# Patient Record
Sex: FEMALE | Race: WHITE | NOT HISPANIC OR LATINO
[De-identification: names, ages, dates, MRNs, and addresses within clinical notes are randomized per-mention and may not be internally consistent; named-entity substitution may affect disease eponyms.]

---

## 2018-05-17 ENCOUNTER — TRANSCRIBE ORDERS (OUTPATIENT)
Dept: SCHEDULING | Age: 63
End: 2018-05-17

## 2018-05-17 DIAGNOSIS — R01.1 UNDIAGNOSED CARDIAC MURMURS: Primary | ICD-10-CM

## 2018-05-17 DIAGNOSIS — R94.31 NONSPECIFIC ABNORMAL ELECTROCARDIOGRAM (ECG) (EKG): ICD-10-CM

## 2018-05-17 DIAGNOSIS — I10 ESSENTIAL HYPERTENSION, MALIGNANT: ICD-10-CM

## 2018-05-30 ENCOUNTER — HOSPITAL ENCOUNTER (OUTPATIENT)
Dept: CARDIOLOGY | Facility: CLINIC | Age: 63
Discharge: HOME | End: 2018-05-30
Attending: FAMILY MEDICINE
Payer: COMMERCIAL

## 2018-05-30 VITALS
HEIGHT: 69 IN | BODY MASS INDEX: 22.81 KG/M2 | WEIGHT: 154 LBS | DIASTOLIC BLOOD PRESSURE: 80 MMHG | SYSTOLIC BLOOD PRESSURE: 138 MMHG | HEART RATE: 88 BPM

## 2018-05-30 DIAGNOSIS — R94.31 NONSPECIFIC ABNORMAL ELECTROCARDIOGRAM (ECG) (EKG): ICD-10-CM

## 2018-05-30 DIAGNOSIS — I10 ESSENTIAL HYPERTENSION, MALIGNANT: ICD-10-CM

## 2018-05-30 DIAGNOSIS — R01.1 UNDIAGNOSED CARDIAC MURMURS: ICD-10-CM

## 2018-05-30 LAB
BSA FOR ECHO PROCEDURE: 1.84 M2
E WAVE DECELERATION TIME: 257 MS
E/A RATIO: 1.3
E/E' RATIO: 21.2
E/LAT E' RATIO: 12
EDV (MM-TEICH): 69.6 CM3
EF (MM-TEICH): 75.9 %
EJECTION FRACTION: 76 %
ESV (MM-TEICH): 16.8 CM3
INTERVENTRICULAR SEPTUM: 1 CM
LA/AORTA RATIO: 1.43
LEFT INTERNAL DIMENSION IN SYSTOLE: 2.2 CM (ref 2.58–3.9)
LEFT VENTRICULAR INTERNAL DIMENSION IN DIASTOLE: 4 CM (ref 4.36–6.05)
M MODE - INTERVENTRICULAR SEPTUM IN END DIASTOLE: 0.96 CM
M MODE - LEFT INTERNAL DIMENSION IN SYSTOLE: 2.23 CM
M MODE - LEFT VENTRICULAR INTERNAL DIMENSION IN DIASTOLE: 3.99 CM
M MODE - LEFT VENTRICULAR POSTERIOR WALL IN END DIASTOLE: 1.11 CM
MV E'TISSUE VEL-LAT: 0.1 M/S
MV E'TISSUE VEL-MED: 0.06 M/S
MV PEAK A VEL: 0.95 M/S
MV PEAK E VEL: 1.25 M/S
POSTERIOR WALL: 1.1 CM
STRESS ANGINA INDEX: 0
STRESS BASELINE BP: NORMAL MMHG
STRESS BASELINE HR: 71 BPM
STRESS PERCENT HR: 96 %
STRESS POST ESTIMATED WORKLOAD: 10.1 METS
STRESS POST EXERCISE DUR MIN: 8 MIN
STRESS POST EXERCISE DUR SEC: 0 SEC
STRESS POST PEAK BP: NORMAL MMHG
STRESS POST PEAK HR: 150 BPM
STRESS TARGET HR: 133 BPM
TR MAX PG: 25 MMHG
TRICUSPID VALVE PEAK REGURGITATION VELOCITY: 2.49 M/S
Z-SCORE OF LEFT VENTRICULAR DIMENSION IN END DIASTOLE: -2.5
Z-SCORE OF LEFT VENTRICULAR DIMENSION IN END SYSTOLE: -2.88

## 2018-05-30 PROCEDURE — 93350 STRESS TTE ONLY: CPT

## 2019-01-23 ENCOUNTER — TRANSCRIBE ORDERS (OUTPATIENT)
Dept: SCHEDULING | Age: 64
End: 2019-01-23

## 2019-01-23 DIAGNOSIS — Z12.31 ENCOUNTER FOR SCREENING MAMMOGRAM FOR MALIGNANT NEOPLASM OF BREAST: Primary | ICD-10-CM

## 2019-02-13 ENCOUNTER — HOSPITAL ENCOUNTER (OUTPATIENT)
Dept: RADIOLOGY | Age: 64
Discharge: HOME | End: 2019-02-13
Attending: FAMILY MEDICINE
Payer: COMMERCIAL

## 2019-02-13 DIAGNOSIS — Z12.31 ENCOUNTER FOR SCREENING MAMMOGRAM FOR MALIGNANT NEOPLASM OF BREAST: ICD-10-CM

## 2019-02-13 PROCEDURE — 77067 SCR MAMMO BI INCL CAD: CPT

## 2019-08-20 ENCOUNTER — TRANSCRIBE ORDERS (OUTPATIENT)
Dept: LAB | Facility: CLINIC | Age: 64
End: 2019-08-20

## 2019-08-20 ENCOUNTER — HOSPITAL ENCOUNTER (OUTPATIENT)
Dept: RADIOLOGY | Facility: CLINIC | Age: 64
Discharge: HOME | End: 2019-08-20
Attending: FAMILY MEDICINE
Payer: COMMERCIAL

## 2019-08-20 DIAGNOSIS — R05.9 COUGH: Primary | ICD-10-CM

## 2019-08-20 DIAGNOSIS — R05.9 COUGH: ICD-10-CM

## 2019-08-20 PROCEDURE — 71046 X-RAY EXAM CHEST 2 VIEWS: CPT

## 2020-02-14 ENCOUNTER — TRANSCRIBE ORDERS (OUTPATIENT)
Dept: RADIOLOGY | Age: 65
End: 2020-02-14

## 2020-02-14 ENCOUNTER — HOSPITAL ENCOUNTER (OUTPATIENT)
Dept: RADIOLOGY | Age: 65
Discharge: HOME | End: 2020-02-14
Attending: FAMILY MEDICINE
Payer: COMMERCIAL

## 2020-02-14 DIAGNOSIS — Z12.31 ENCOUNTER FOR SCREENING MAMMOGRAM FOR MALIGNANT NEOPLASM OF BREAST: Primary | ICD-10-CM

## 2020-02-14 DIAGNOSIS — Z12.31 ENCOUNTER FOR SCREENING MAMMOGRAM FOR MALIGNANT NEOPLASM OF BREAST: ICD-10-CM

## 2020-02-14 PROCEDURE — 77063 BREAST TOMOSYNTHESIS BI: CPT

## 2020-02-14 PROCEDURE — 77067 SCR MAMMO BI INCL CAD: CPT

## 2021-01-22 ENCOUNTER — TRANSCRIBE ORDERS (OUTPATIENT)
Dept: SCHEDULING | Age: 66
End: 2021-01-22

## 2021-01-22 DIAGNOSIS — R06.02 SHORTNESS OF BREATH: ICD-10-CM

## 2021-01-22 DIAGNOSIS — R63.4 ABNORMAL WEIGHT LOSS: ICD-10-CM

## 2021-01-22 DIAGNOSIS — R05.9 COUGH: Primary | ICD-10-CM

## 2021-01-26 ENCOUNTER — TRANSCRIBE ORDERS (OUTPATIENT)
Dept: SCHEDULING | Age: 66
End: 2021-01-26

## 2021-01-26 DIAGNOSIS — Z12.31 ENCOUNTER FOR SCREENING MAMMOGRAM FOR MALIGNANT NEOPLASM OF BREAST: Primary | ICD-10-CM

## 2021-01-27 ENCOUNTER — HOSPITAL ENCOUNTER (OUTPATIENT)
Dept: RADIOLOGY | Facility: CLINIC | Age: 66
Discharge: HOME | End: 2021-01-27
Attending: NURSE PRACTITIONER
Payer: COMMERCIAL

## 2021-01-27 DIAGNOSIS — R05.9 COUGH: ICD-10-CM

## 2021-01-27 DIAGNOSIS — R06.02 SHORTNESS OF BREATH: ICD-10-CM

## 2021-01-27 DIAGNOSIS — R63.4 ABNORMAL WEIGHT LOSS: ICD-10-CM

## 2021-01-27 PROCEDURE — 71250 CT THORAX DX C-: CPT

## 2021-02-01 ENCOUNTER — HOSPITAL ENCOUNTER (OUTPATIENT)
Dept: RADIOLOGY | Facility: CLINIC | Age: 66
Discharge: HOME | End: 2021-02-01
Attending: INTERNAL MEDICINE
Payer: COMMERCIAL

## 2021-02-01 VITALS — HEIGHT: 69 IN | BODY MASS INDEX: 21.33 KG/M2 | WEIGHT: 144 LBS

## 2021-02-01 DIAGNOSIS — R91.8 OTHER NONSPECIFIC ABNORMAL FINDING OF LUNG FIELD: ICD-10-CM

## 2021-02-01 RX ORDER — FLUDEOXYGLUCOSE F18 300 MCI/ML
8.51 INJECTION INTRAVENOUS ONCE
Status: COMPLETED | OUTPATIENT
Start: 2021-02-01 | End: 2021-02-01

## 2021-02-01 RX ADMIN — FLUDEOXYGLUCOSE F18 8.51 MILLICURIE: 300 INJECTION INTRAVENOUS at 09:25

## 2021-02-05 ENCOUNTER — HOSPITAL ENCOUNTER (OUTPATIENT)
Facility: HOSPITAL | Age: 66
Discharge: HOME | End: 2021-02-05
Attending: INTERNAL MEDICINE | Admitting: INTERNAL MEDICINE
Payer: COMMERCIAL

## 2021-02-05 VITALS
TEMPERATURE: 98.6 F | WEIGHT: 146 LBS | BODY MASS INDEX: 21.62 KG/M2 | RESPIRATION RATE: 18 BRPM | DIASTOLIC BLOOD PRESSURE: 73 MMHG | OXYGEN SATURATION: 93 % | HEIGHT: 69 IN | HEART RATE: 94 BPM | SYSTOLIC BLOOD PRESSURE: 165 MMHG

## 2021-02-05 DIAGNOSIS — R59.0 MEDIASTINAL ADENOPATHY: ICD-10-CM

## 2021-02-05 DIAGNOSIS — R91.8 LUNG MASS: ICD-10-CM

## 2021-02-05 PROCEDURE — 71000011 HC PACU PHASE 1 EA ADDL MIN: Performed by: INTERNAL MEDICINE

## 2021-02-05 PROCEDURE — 0BD88ZX EXTRACTION OF LEFT UPPER LOBE BRONCHUS, VIA NATURAL OR ARTIFICIAL OPENING ENDOSCOPIC, DIAGNOSTIC: ICD-10-PCS | Performed by: INTERNAL MEDICINE

## 2021-02-05 PROCEDURE — 36000048 HC BRONCH EBUS SAMPLING 3/>NODE

## 2021-02-05 PROCEDURE — 36100169

## 2021-02-05 PROCEDURE — 07D78ZX EXTRACTION OF THORAX LYMPHATIC, VIA NATURAL OR ARTIFICIAL OPENING ENDOSCOPIC, DIAGNOSTIC: ICD-10-PCS | Performed by: INTERNAL MEDICINE

## 2021-02-05 PROCEDURE — 25000000 HC PHARMACY GENERAL: Performed by: INTERNAL MEDICINE

## 2021-02-05 PROCEDURE — 25800000 HC PHARMACY IV SOLUTIONS: Performed by: INTERNAL MEDICINE

## 2021-02-05 PROCEDURE — 71000001 HC PACU PHASE 1 INITIAL 30MIN: Performed by: INTERNAL MEDICINE

## 2021-02-05 PROCEDURE — U0002 COVID-19 LAB TEST NON-CDC: HCPCS | Performed by: INTERNAL MEDICINE

## 2021-02-05 PROCEDURE — 88112 CYTOPATH CELL ENHANCE TECH: CPT | Mod: 59 | Performed by: INTERNAL MEDICINE

## 2021-02-05 PROCEDURE — 0BDB8ZX EXTRACTION OF LEFT LOWER LOBE BRONCHUS, VIA NATURAL OR ARTIFICIAL OPENING ENDOSCOPIC, DIAGNOSTIC: ICD-10-PCS | Performed by: INTERNAL MEDICINE

## 2021-02-05 PROCEDURE — 27200000 HC STERILE SUPPLY

## 2021-02-05 PROCEDURE — 94640 AIRWAY INHALATION TREATMENT: CPT

## 2021-02-05 PROCEDURE — 36000049 HC BRONCH EBUS SAMPLING 1/2 NODE

## 2021-02-05 PROCEDURE — BB4CZZZ ULTRASONOGRAPHY OF MEDIASTINUM: ICD-10-PCS | Performed by: INTERNAL MEDICINE

## 2021-02-05 RX ORDER — LIDOCAINE HYDROCHLORIDE 40 MG/ML
5 SOLUTION TOPICAL ONCE
Status: COMPLETED | OUTPATIENT
Start: 2021-02-05 | End: 2021-02-05

## 2021-02-05 RX ORDER — DILTIAZEM HYDROCHLORIDE 240 MG/1
CAPSULE, COATED, EXTENDED RELEASE ORAL
COMMUNITY
Start: 2021-01-22 | End: 2022-01-16

## 2021-02-05 RX ORDER — SODIUM CHLORIDE 9 MG/ML
INJECTION, SOLUTION INTRAVENOUS CONTINUOUS
Status: DISCONTINUED | OUTPATIENT
Start: 2021-02-05 | End: 2021-02-06 | Stop reason: HOSPADM

## 2021-02-05 RX ADMIN — LIDOCAINE HYDROCHLORIDE 5 ML: 40 SOLUTION TOPICAL at 13:37

## 2021-02-05 RX ADMIN — SODIUM CHLORIDE: 9 INJECTION, SOLUTION INTRAVENOUS at 11:28

## 2021-02-05 NOTE — PERIOPERATIVE NURSING NOTE
Pt. D/c'd from Stage 2 to home as per Dr. Redd's orders. VSS. Pt. Has no c/o pain. Pt has no bloody sputum noted with cough.

## 2021-02-05 NOTE — DISCHARGE INSTRUCTIONS
Moderate Conscious Sedation, Adult, Care After  These instructions provide you with information about caring for yourself after your procedure. Your health care provider may also give you more specific instructions. Your treatment has been planned according to current medical practices, but problems sometimes occur. Call your health care provider if you have any problems or questions after your procedure.  What can I expect after the procedure?  After your procedure, it is common:  · To feel sleepy for several hours.  · To feel clumsy and have poor balance for several hours.  · To have poor judgment for several hours.  · To vomit if you eat too soon.  Follow these instructions at home:  For at least 24 hours after the procedure:    · Do not:  ? Participate in activities where you could fall or become injured.  ? Drive.  ? Use heavy machinery.  ? Drink alcohol.  ? Take sleeping pills or medicines that cause drowsiness.  ? Make important decisions or sign legal documents.  ? Take care of children on your own.  · Rest.  Eating and drinking  · Follow the diet recommended by your health care provider.  · If you vomit:  ? Drink water, juice, or soup when you can drink without vomiting.  ? Make sure you have little or no nausea before eating solid foods.  General instructions  · Have a responsible adult stay with you until you are awake and alert.  · Take over-the-counter and prescription medicines only as told by your health care provider.  · If you smoke, do not smoke without supervision.  · Keep all follow-up visits as told by your health care provider. This is important.  Contact a health care provider if:  · You keep feeling nauseous or you keep vomiting.  · You feel light-headed.  · You develop a rash.  · You have a fever.  Get help right away if:  · You have trouble breathing.  This information is not intended to replace advice given to you by your health care provider. Make sure you discuss any questions you have  with your health care provider.  Document Released: 10/08/2014 Document Revised: 11/30/2018 Document Reviewed: 04/08/2017  Elsevier Patient Education © 2020 [employer]       Bronchoscopy  Discharge Instructions          1. You had a bronchoscopy today. For this, IV sedation/anesthesia was used, so you may feel drowsy. You are not permitted to drive today, nor have any alcoholic beverages. Rest for the remainder of the day. You may resume these activities tomorrow.    2. Occasionally, the arm gets sore at the site of the IV. Use cold compresses today followed by warm compresses tomorrow and as long as necessary. If a red streak develops from the site, please notify your physician.    3. You may eat today at 3:30PM. Begin by taking a small sip of water. If you do not cough, you may eat and drink. Please chew your food well and eat slowly.    4. You may see streaks of blood in your sputum for 1-2 days, this is normal and expected.    ***RED FLAGS***:  If the amount of blood in your sputum increases or you develop chest pain, shortness of breath, a temperature greater than 102F, or any other symptoms that you find concerning, please call your doctor immediately.    5. Medications: See Medication Reconciliation List.    6. If a problem occurs after discharge, please notify Dr. Daniela Redd at [phone].    7. Call Dr. Redd for the results of your bronchoscopy in 48-72 hours at [phone].    8. Follow up appointment: as previously arranged. You may inquire about this at the telephone number listed above if you do not have an appointment already scheduled.      These discharge instructions have been explained to the patient and/or his/her family. I have received and understand the above instructions.        Valentina Thorne MD for Dr. Daniela Redd  02/05/21

## 2021-02-05 NOTE — OR SURGEON
Lisa Kezia presents today for bronchoscopy EBUS/radial probe. I have identified the patient in the preop waiting area. Consent was obtained and is on the chart. I am the  performing the procedure.      Daniela Redd DO [facility]

## 2021-02-09 ENCOUNTER — LAB REQUISITION (OUTPATIENT)
Dept: LAB | Facility: HOSPITAL | Age: 66
End: 2021-02-09
Attending: INTERNAL MEDICINE
Payer: COMMERCIAL

## 2021-02-09 DIAGNOSIS — C34.12 MALIGNANT NEOPLASM OF UPPER LOBE, LEFT BRONCHUS OR LUNG (CMS/HCC): ICD-10-CM

## 2021-02-09 LAB
ALBUMIN SERPL-MCNC: 3.3 G/DL (ref 3.4–5)
ALP SERPL-CCNC: 61 IU/L (ref 35–126)
ALT SERPL-CCNC: 25 IU/L (ref 11–54)
ANION GAP SERPL CALC-SCNC: 10 MEQ/L (ref 3–15)
AST SERPL-CCNC: 48 IU/L (ref 15–41)
BASOPHILS # BLD: 0.04 K/UL (ref 0.01–0.1)
BASOPHILS NFR BLD: 0.5 %
BILIRUB SERPL-MCNC: 0.8 MG/DL (ref 0.3–1.2)
BUN SERPL-MCNC: 7 MG/DL (ref 8–20)
CALCIUM SERPL-MCNC: 9.6 MG/DL (ref 8.9–10.3)
CEA SERPL-MCNC: 22 NG/ML
CHLORIDE SERPL-SCNC: 102 MEQ/L (ref 98–109)
CO2 SERPL-SCNC: 26 MEQ/L (ref 22–32)
CREAT SERPL-MCNC: 0.7 MG/DL (ref 0.6–1.1)
DIFFERENTIAL METHOD BLD: ABNORMAL
EOSINOPHIL # BLD: 0.31 K/UL (ref 0.04–0.36)
EOSINOPHIL NFR BLD: 3.7 %
ERYTHROCYTE [DISTWIDTH] IN BLOOD BY AUTOMATED COUNT: 14.8 % (ref 11.7–14.4)
EST. AVERAGE GLUCOSE BLD GHB EST-MCNC: 91 MG/DL
FERRITIN SERPL-MCNC: 364 NG/ML (ref 11–250)
FOLATE SERPL-MCNC: 17.9 NG/ML
GFR SERPL CREATININE-BSD FRML MDRD: >60 ML/MIN/1.73M*2
GLUCOSE SERPL-MCNC: 117 MG/DL (ref 70–99)
HBA1C MFR BLD HPLC: 4.8 %
HCT VFR BLDCO AUTO: 32.3 % (ref 35–45)
HGB BLD-MCNC: 10.2 G/DL (ref 11.8–15.7)
IMM GRANULOCYTES # BLD AUTO: 0.05 K/UL (ref 0–0.08)
IMM GRANULOCYTES NFR BLD AUTO: 0.6 %
IRON SATN MFR SERPL: 9 % (ref 15–45)
IRON SERPL-MCNC: 23 UG/DL (ref 35–150)
LYMPHOCYTES # BLD: 1.91 K/UL (ref 1.2–3.5)
LYMPHOCYTES NFR BLD: 23 %
MAGNESIUM SERPL-MCNC: 2 MG/DL (ref 1.8–2.5)
MCH RBC QN AUTO: 26.3 PG (ref 28–33.2)
MCHC RBC AUTO-ENTMCNC: 31.6 G/DL (ref 32.2–35.5)
MCV RBC AUTO: 83.2 FL (ref 83–98)
MONOCYTES # BLD: 0.85 K/UL (ref 0.28–0.8)
MONOCYTES NFR BLD: 10.2 %
NEUTROPHILS # BLD: 5.16 K/UL (ref 1.7–7)
NEUTROPHILS # BLD: 5.16 K/UL (ref 1.7–7)
NEUTS SEG NFR BLD: 62 %
NRBC BLD-RTO: 0 %
PDW BLD AUTO: 9.6 FL (ref 9.4–12.3)
PLATELET # BLD AUTO: 373 K/UL (ref 150–369)
POTASSIUM SERPL-SCNC: 4.1 MEQ/L (ref 3.6–5.1)
PROT SERPL-MCNC: 6.4 G/DL (ref 6–8.2)
RBC # BLD AUTO: 3.88 M/UL (ref 3.93–5.22)
SODIUM SERPL-SCNC: 138 MEQ/L (ref 136–144)
TIBC SERPL-MCNC: 252 UG/DL (ref 270–460)
UIBC SERPL-MCNC: 229 UG/DL (ref 180–360)
VIT B12 SERPL-MCNC: 407 PG/ML (ref 180–914)
WBC # BLD AUTO: 8.32 K/UL (ref 3.8–10.5)

## 2021-02-09 PROCEDURE — 82746 ASSAY OF FOLIC ACID SERUM: CPT | Performed by: INTERNAL MEDICINE

## 2021-02-09 PROCEDURE — 83540 ASSAY OF IRON: CPT | Performed by: INTERNAL MEDICINE

## 2021-02-09 PROCEDURE — 80053 COMPREHEN METABOLIC PANEL: CPT | Performed by: INTERNAL MEDICINE

## 2021-02-09 PROCEDURE — 85025 COMPLETE CBC W/AUTO DIFF WBC: CPT | Performed by: INTERNAL MEDICINE

## 2021-02-09 PROCEDURE — 36415 COLL VENOUS BLD VENIPUNCTURE: CPT | Performed by: INTERNAL MEDICINE

## 2021-02-09 PROCEDURE — 82728 ASSAY OF FERRITIN: CPT | Performed by: INTERNAL MEDICINE

## 2021-02-09 PROCEDURE — 83036 HEMOGLOBIN GLYCOSYLATED A1C: CPT | Performed by: INTERNAL MEDICINE

## 2021-02-09 PROCEDURE — 82378 CARCINOEMBRYONIC ANTIGEN: CPT | Performed by: INTERNAL MEDICINE

## 2021-02-09 PROCEDURE — 83735 ASSAY OF MAGNESIUM: CPT | Performed by: INTERNAL MEDICINE

## 2021-02-09 PROCEDURE — 82607 VITAMIN B-12: CPT | Performed by: INTERNAL MEDICINE

## 2021-03-01 LAB
CASE RPRT: NORMAL
IMMUNE STAIN STUDY: NORMAL
PATH REPORT.ADDENDUM SPEC: NORMAL
PATH REPORT.ADDENDUM SPEC: NORMAL
PATH REPORT.FINAL DX SPEC: NORMAL
PATH REPORT.FINAL DX SPEC: NORMAL
PATH REPORT.GROSS SPEC: NORMAL

## 2021-04-13 DIAGNOSIS — Z23 ENCOUNTER FOR IMMUNIZATION: ICD-10-CM

## 2021-11-26 ENCOUNTER — BMR PREADMISSION ASSESSMENT (OUTPATIENT)
Dept: ADMISSIONS | Facility: REHABILITATION | Age: 66
End: 2021-11-26

## 2021-12-22 ENCOUNTER — TRANSCRIBE ORDERS (OUTPATIENT)
Dept: SCHEDULING | Facility: REHABILITATION | Age: 66
End: 2021-12-22

## 2021-12-22 DIAGNOSIS — C79.49 SECONDARY MALIGNANT NEOPLASM OF OTHER PARTS OF NERVOUS SYSTEM (CMS/HCC): ICD-10-CM

## 2021-12-22 DIAGNOSIS — C79.31 SECONDARY MALIGNANT NEOPLASM OF BRAIN (CMS/HCC): Primary | ICD-10-CM

## 2021-12-22 DIAGNOSIS — Z98.890 OTHER SPECIFIED POSTPROCEDURAL STATES: ICD-10-CM

## 2021-12-22 DIAGNOSIS — R26.89 OTHER ABNORMALITIES OF GAIT AND MOBILITY: ICD-10-CM

## 2022-01-03 ENCOUNTER — HOSPITAL ENCOUNTER (OUTPATIENT)
Dept: OCCUPATIONAL THERAPY | Facility: REHABILITATION | Age: 67
Setting detail: THERAPIES SERIES
Discharge: HOME | End: 2022-01-03
Attending: PHYSICIAN ASSISTANT
Payer: COMMERCIAL

## 2022-01-03 ENCOUNTER — HOSPITAL ENCOUNTER (OUTPATIENT)
Dept: PHYSICAL THERAPY | Facility: REHABILITATION | Age: 67
Setting detail: THERAPIES SERIES
Discharge: HOME | End: 2022-01-03
Attending: PHYSICIAN ASSISTANT
Payer: COMMERCIAL

## 2022-01-03 DIAGNOSIS — C79.49 SECONDARY MALIGNANT NEOPLASM OF OTHER PARTS OF NERVOUS SYSTEM (CMS/HCC): ICD-10-CM

## 2022-01-03 DIAGNOSIS — Z98.890 OTHER SPECIFIED POSTPROCEDURAL STATES: ICD-10-CM

## 2022-01-03 DIAGNOSIS — C79.31 SECONDARY MALIGNANT NEOPLASM OF BRAIN (CMS/HCC): Primary | ICD-10-CM

## 2022-01-03 DIAGNOSIS — R26.89 OTHER ABNORMALITIES OF GAIT AND MOBILITY: ICD-10-CM

## 2022-01-03 DIAGNOSIS — C79.31 SECONDARY MALIGNANT NEOPLASM OF BRAIN (CMS/HCC): ICD-10-CM

## 2022-01-03 PROCEDURE — 97530 THERAPEUTIC ACTIVITIES: CPT | Mod: GP

## 2022-01-03 PROCEDURE — 97162 PT EVAL MOD COMPLEX 30 MIN: CPT | Mod: GP

## 2022-01-03 PROCEDURE — 97167 OT EVAL HIGH COMPLEX 60 MIN: CPT | Mod: GO

## 2022-01-03 RX ORDER — LANSOPRAZOLE 30 MG/1
30 CAPSULE, DELAYED RELEASE ORAL
COMMUNITY
Start: 2021-12-20 | End: 2022-01-03

## 2022-01-03 RX ORDER — LOSARTAN POTASSIUM 25 MG/1
25 TABLET ORAL DAILY
COMMUNITY
Start: 2021-03-08 | End: 2022-01-16

## 2022-01-03 NOTE — Clinical Note
[address]  [facility] [address]  [phone]      Dear DR. Lott,      Thank you for this referral. Please review the attached notes and plan of care for your approval.  Please contact our department with any questions.     Sincerely,     Gabriela Voss, WILBERT    Referring Provider: By co-signing this Plan of Care (POC) either electronically or physically you agree to the following:    I have reviewed the the Plan of Care established by the therapist within this document and certify that the services are skilled and medically necessary. I have reviewed the plan and recommend that these services continue to meet the goals stated in this document.       EXTERNAL PROVIDER FAXING BACK:    PHYSICIAN SIGNATURE: __________________________________     DATE: ___________________   TIME: _________    IMPORTANT:  If returning this Plan of Care by fax, please fax back ONLY the signature page.   _____________________________________________________________________      [facility] Fax: [phone]      OT EVALUATION FOR OUTPATIENT THERAPY    Patient: Lisa Mast   MRN: [id number]  : 1955 66 y.o.  Referring Physician: Hilda Lott P*  Date of Visit: 1/3/2022    Certification Dates:   22 through 22    Recommended Frequency & Duration:  2 times/week for up to 3 months        Diagnosis:   1. Secondary malignant neoplasm of brain (CMS/HCC)    2. 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 Other specified postprocedural states    4. Other abnormalities of gait and mobility        History of Present Illness: Malignant Neoplasm     Chief Complaints:   Chief Complaint   Patient presents with   • Self Care Difficulties   • Visual Deficits   • Dec Strength   • Balance Deficits   • Pain   • Cognition   • Dec Coordination   • Decreased Mobility   •  Difficulty Performing Work/school Tasks       Precautions: fall,chemotherapy    Past Medical History:   Past Medical History:    Diagnosis Date   • Hypertension    • Lung mass        Past Surgical History:   Past Surgical History:   Procedure Laterality Date   • APPENDECTOMY     • CATARACT EXTRACTION, BILATERAL           LEARNING ASSESSMENT    Assessment completed: Yes    Learner name:  Lisa    Relationship: Patient    Learning Barriers:  Learning barriers: Cognitive    Preferred Language: English     Needed: No    Learning New Concepts: Listening, Reading, Demonstration and Pictures/Video    Education Provided: ON OT POC.       CO-LEARNER ASSESSMENT:    Completed: Yes:   Co-Learner name:  Madhu    Relationship: Significant other    Learning Barriers:  Learning barriers: No Barriers    Preferred Language: English     Needed: No    Learning New Concepts: Listening, Reading, Demonstration and Pictures/Video    Education Provided: On OT POC.           OBJECTIVE MEASUREMENTS/DATA:     Assessment - 01/03/22 1310        Assessment    Plan of Care reviewed and patient/family in agreement Yes     System Pathology/Pathophysiology Noted neuromuscular     Functional Limitations in Following Categories self-care;home management;community/leisure     Problem List: Occupational Therapy pain;impaired balance;strength decreased;coordination impaired;impaired cognition;motor control impaired     Rehab Potential/Prognosis: Occupational Therapy good, to achieve stated therapy goals     Clinical Assessment Lisa Mast is a 66 year old right hand dominant female referred to OP OT following a diagnosis of malignant neoplasm in 11/2021. Pt arrives to evaluation with spouse, Madhu who was present for evaluation. In November, 2021 pt began having headaches. Scans were ran and pt discovered a brain mass growing in the cerebellum. Pt went into [facility] for surgery on 11/22/21 to remove the tumor. Pt is currently receiving Keytruda infusions every 6 weeks to treat current diagnosis of Lung Cancer. Pt arrives pleasant to evaluation. Reports biggest  challenge since removal of the brain tumor has been her balance. Subjectively, pt reports her main goals for OP OT are to improve balance, steadiness during functionals tasks, improve vision impairments and to get rid of headaches. Pt was able to tolerate first 30 minutes of evaluation in a well-lit area, however, following first 30 minutes pt requested to move to another area where the lights could be dimmed due to increase in headache. Pt tolerated seated at EOM for 20 minutes when performing standardized assessment, following end of assessment pt assisted to lying in supine to due to severe 8/10 pain in head. Spouse, reporting pt was due to Tylenol and had forgotten medication at home. Objectively, pt presents with impaired transfers, balance, saccadic speed asses through the Fabio Devick and impaired fine motor coordination and  strength. Recommending further cognitive and visual assessments through OT POC. Spouse reports pt has a history of double vision in left eye. Recommending first few session be performed in a quiet, private room with dim lights. Recommending OP OT services 2 x a week for 12 weeks to improve visual perceptual skills, fine motor coordination and  strength,  and balance during functional tasks.     Plan and Recommendations Recommending OP OT services 2 x a week to improve visual deficits, balance and improved functional activity tolerance with minimal reports of increased headache with performance.     Planned Services CPT 41830 Cognitive skills development/treatment;CPT 98426 Neuromuscular Reeducation;CPT 16782 Orthotics/Prosthetics Management and training (Subsequent encounters);CPT 55724 Self-care/Home management training;CPT 91408 Sensory integration;CPT 09514 Therapeutic activities;CPT 98241 Therapeutic exercises;CPT 02583 Electrical stimulation ATTENDED;CPT 67940 Hot/Cold Packs therapy     Comments/Additional Services BITS, Dynavision, HEP, NMES, NMRE                General  Information - 01/03/22 1310        General Information    Document Type initial evaluation     Onset of Illness/Injury or Date of Surgery 11/22/21     Referring Physician Hilda Lott     Pertinent History of Current Functional Problem Lisa Mast is a 66 year old right hand dominant female referred to OP OT following a diagnosis of malignant neoplasm 11/2021. Pt arrives to evaluation with spouse, Madhu who was present for evaluation. In November, 2021 pt began having headaches. Scans were ran and it pt discovered a brain mass growing in the cerebellum. Pt went into [facility] for surgery on 11/22/21 to remove the tumor. Spouse reports mass has been fully removed, however pt will be going in for a final scope this week. Prior to discovery, pt has been has a history of Lung Cancer. Pt has been receiving Keytruda infusion every 6 weeks to treat her lung cancer. Spouse reports pt began treatment for lung cancer in January 2021. Pt began with 4 rounds of chemotherapy treatment. Pt then began radiation treatment for 33 days in March of 2021 and as of May 2021 pt has been consistently receiving the Keytruda infusion to treat the lung cancer. Prior to brain tumor pt was independent in all ADLs and IADLs. Pt states primary goals for seeking OP OT services are to improve balance, get more steady, improve vision and to get rid of the headaches. Pt complains of headaches worsening to a 7/10 and will take Tylenol to help with the pain.     Patient/Family/Caregiver Comments/Observations Pt arrives to evaluation with spouse, Madhu.     Limitations/Impairments safety/cognitive;visual     Existing Precautions/Restrictions fall;chemotherapy     Precautions comments Recommending quiet private room with lights dim due to patient request and complaints of constant headache.        OT Time Calculation    Start Time 1300     Stop Time 1400     Time Calculation (min) 60 min                Pain/Vitals - 01/03/22 1310        Pain Assessment     Currently in pain Yes   Pt reports severe headaches.    Preferred Pain Scale number (Numeric Rating Pain Scale)     Pain: Body location Head   Reports pain in head was getting better, however most recent 3-4 days has been worse    Pain Rating (0-10): Pre Activity 7     Pain Rating (0-10): Activity 8     Pain Rating (0-10): Post Activity 8        Pre Activity Vital Signs    Pulse 94     SpO2 95 %     Oxygen Therapy None (Room air)     /78     BP Location Right upper arm     BP Method Manual     Patient Position Sitting        Pain Interventions    Intervention  Monitored     Post Intervention Comments Monitored                OT - 01/03/22 2018        Occupational Therapy Encounter Type Details    Occupational Therapy Neuro        OT Frequency and Duration    Frequency of treatment 2 times/week     OT Duration 3 months     OT Cert From 01/03/22     OT Cert To 04/03/22     Date OT POC was sent to provider 01/03/22     Signed OT Plan of Care received?  No                Falls Assessment - 01/03/22 1310        Initial Falls Assessment    One or more falls in the last year Yes     How many times 1     Was the patient injured in any fall Yes   Pt reports she rolled out of bed and banged her head. Reports PCP is aware of fall    Fall prevention interventions recommended [place] and re-orient the patient to the environment;Educate and re-educate the patient on safety strategies                 PLOF:    Prior Level of Function - 01/03/22 1310        OTHER    Previous level of function Prior pt was independent in all ADLs and IADLs.               Living Environment    Living Environment - 01/03/22 1310        Living Environment    People in Home spouse     Living Arrangements house     Living Environment Comment First floor master bedroom and bathroom, two story home. One step to enter in front door, two steps to enter in back door.     Transportation Concerns car, none        Relationship/Environment    Name(s) of  People in Home Spouse, Madhu.               Range of Motion     PROM/AROM - 01/03/22 1300        RIGHT: Upper Extremity AROM Assessment    Right UE AROM normal WFL        LEFT: Upper Extremity AROM Assessment    Left UE AROM normal WFL               Transfers     Bed Mobility/Transfers - 01/03/22 2018        OTHER    Comments Min-Mod SPT from w/c to EOM and return to w/c from EOM.               BADL/IADL    BADL/IADL - 01/03/22 1310        BADL Interventions Assessment    Upper Body Dressing Close supervision     Lower Body Dressing Close supervision;Minimum assist (75% patient effort)     Lower body dressing comments Due to pt decrease in balance, spouse provides min-close by assistance.     Bathing Modified independence;Close supervision     Bathing comments Pt has a shower chair and spouse provides assistance in and out of the tub.     Toileting Close supervision;Modified independence     Toileting comments Grab bars in bathroom.     Grooming Independent     Eating Independent        IADL/Home Interventions Assessment    Driving and community mobility Dependent (less than 25% patient effort)     Driving and community mobility comments Spouse primarily provides transportation. Patient is not cleared to resume driving.               Gross and Fine Motor     Gross and Fine Motor - 01/03/22 1310        Gross Motor Control Assessment    Motor Control tests 9 Hole Peg     9 Hole Peg Test - COMMENTS R: 44.14 seconds (pt required verbal cues to  1 at a time after picking up two pegs. L: 44.27 seconds pt required verbal cues for removing pegs once finished     Comment, Gross Motor Coordination Right hand dominant.        Hand  Strength Testing    Left Hand, Setting 2 38, 40, 40 = 39.3 lb average     Right Hand, Setting 2 40, 35, 25 = 33.3 lb average               Vision     Vision - 01/03/22 1310        Vision Assessment    Visual Impairment/Limitations corrective lenses for reading;diplopia   diplopia left eye  "   Impact of Vision Impairment on Function Pt reports vision concerns. November 2021 saw an opthamologist when in [facility]. Spouse reports pt has double vision in left eye.        Saccadic Fixation Speed    Fabio Devick Test #1 (seconds) 38.25 Seconds     Fabio Devick Test #2 (seconds) 54.53 Seconds     Fabio Devick Test #3 (seconds) 67.27 Seconds     Fabio Devick Total Time 160.05 Seconds     Fabio Devick Errors #1 1     Fabio Devick Errors #2 0     Fabio Devick Errors #3 6     Fabio Devick Total Errors 7                   GOALS:    Goals     • OT Goals         Pt stated goals \"to be more steady, get rid of headaches and improve vision\"    .  Short Term Goals Time Frame Result Comment/Progress   Assess Horizontal and Vertical Saccadic speed, Dynavision, Trial Making and/or MoCA and establish goal as needed 2-4 weeks     Improve B UE  strength by 5 lb to maximize functional capabilities  2-4 weeks     Decrease B UE 9 Hole Peg Test time by 10 seconds to maximize functional capabilities during fine motor tasks.  4-6 weeks     Improve standing tolerance to 10 minutes to maximize independence in ADLs/IADLs during functional tasks 4-6 weeks     Improve functional transfers to close supervision to maximize independence and safety during ADL tasks   4-6 weeks     Pt will be participate in progressively monitored  home exercise program to achieve goal attainment.  4-6 weeks     Improve functional visual scanning and saccadic speed via Fabio Devick Assessment to within 10 seconds.  4-6 weeks          Short Term Goals Time Frame Result Comment/Progress   Improve B UE  strength by 10 lb to maximize functional capabilities  By discharge     Decrease B UE 9 Hole Peg Test time by 15 seconds to maximize functional capabilities during fine motor tasks.  By discharge     Improve standing tolerance to 30 minutes to maximize independence in ADLs/IADLs during functional tasks By discharge     Improve functional transfers to " Independent level to maximize independence and safety during ADL tasks   By discharge     Improve functional visual scanning and saccadic speed via [facility] Assessment to within 30 seconds.      By discharge     Pt will be independent in progressively monitored  home exercise program to achieve goal attainment.  By discharge                      TREATMENT PLAN:    OT NEURO FLOW SHEET    EXERCISES CURRENT SESSION TIME   NEURO RE-ED TOTAL TIME FOR SESSION Not performed   AAROM/AROM     Strengthening      Strength     Gross Motor Control     Fine Motor Control     Sitting Deniz/Balance     Standing Deniz/Balance      Retraining     THERE ACT TOTAL TIME FOR SESSION Not performed   Pain, Vitals, Meds, Etc.     HEP     Functional Mobility     Transfers     THERE EX TOTAL TIME FOR SESSION Not performed   PROM     Activity Tolerance     SELF-CARE TOTAL TIME FOR SESSION Not performed   Pt Education/Safety     ADL Training     IADL Training     MODALITIES TOTAL TIME FOR SESSION Not performed   Ice/Heat     ATTENDED E-STIM TOTAL TIME FOR SESSION Not performed   Attended E-Stim     SPLINTING TOTAL TIME FOR SESSION Not performed   Fabrication/Check Out     Fit     Training             This 66 y.o. year old female presents to OT with above stated diagnosis. Occupational Therapy evaluation reveals pain,impaired balance,strength decreased,coordination impaired,impaired cognition,motor control impaired resulting in self-care,home management,community/leisure limitations. Lisa Mast will benefit from skilled OT services to address limitation, work towards rehab and patient goals and maximize PLOF of chosen ADLs.     Planned Services: The patient’s treatment will include CPT 92767 Cognitive skills development/treatment,CPT 42384 Neuromuscular Reeducation,CPT 62876 Orthotics/Prosthetics Management and training (Subsequent encounters),CPT 88923 Self-care/Home management training,CPT 72982 Sensory integration,CPT 90331  Therapeutic activities,CPT 27892 Therapeutic exercises,CPT 72096 Electrical stimulation ATTENDED,CPT 49409 Hot/Cold Packs therapy,BITS, Dynavision, HEP, NMES, NMRE.

## 2022-01-03 NOTE — Clinical Note
[address]  [facility] [address]  [phone]      Dear DR. Lott,      Thank you for this referral. Please review the attached notes and plan of care for your approval.  Please contact our department with any questions.     Sincerely,     Mayelin Lofton, PT      Referring Provider: By co-signing this Plan of Care (POC) either electronically or physically you agree to the following:    I have reviewed the the Plan of Care established by the therapist within this document and certify that the services are skilled and medically necessary. I have reviewed the plan and recommend that these services continue to meet the goals stated in this document.       EXTERNAL PROVIDER FAXING BACK:    PHYSICIAN SIGNATURE: __________________________________     DATE: ___________________  TIME: _____________    IMPORTANT:  If returning this Plan of Care by fax, please fax back ONLY the signature page.   _________________________________________________________________________      [facility] Fax: [phone]        PT EVALUATION FOR OUTPATIENT THERAPY    Patient: Lisa Mast    MRN: [id number]  : 1955 66 y.o.   Referring Physician: Hilda Lott P*  Date of Visit: 1/3/2022      Certification Dates:  22 through 22         Recommended Frequency & Duration:  2 times/week for up to 3 months     Diagnosis:   1. Secondary malignant neoplasm of brain (CMS/HCC)    2. 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 Other specified postprocedural states    4. Other abnormalities of gait and mobility        Chief Complaints:   Chief Complaint   Patient presents with   • Difficulty Walking   • Balance Deficits   • Dec Strength   • Dizziness       Precautions: fall,chemotherapy,other (see comments)    Past Medical History:   Past Medical History:   Diagnosis Date   • Hypertension    • Lung cancer (CMS/HCC)    • Lung mass        Past Surgical History:   Past Surgical  History:   Procedure Laterality Date   • APPENDECTOMY     • BRAIN SURGERY      cerebellar mass resection   • CATARACT EXTRACTION, BILATERAL           LEARNING ASSESSMENT    Assessment completed:  Yes    Learner name:  Lisa Mast    Relationship: Patient    Learning Barriers:  Learning barriers: No Barriers    Preferred Language: English     Needed: No    Learning New Concepts: Listening, Reading, Demonstration and Pictures/Video    Education Provided: See below.      CO-LEARNER ASSESSMENT:    Completed: Yes:   Co-Learner name:  Madhu     Relationship: Significant other    Learning Barriers:  Learning barriers: No Barriers    Preferred Language: English     Needed: No    Learning New Concepts: Listening, Reading, Demonstration and Pictures/Video    Education Provided:             OBJECTIVE MEASUREMENTS/DATA:     Assessment and Plan - 01/04/22 0816        Assessment    Plan of Care reviewed and patient/family in agreement Yes     System Pathology/Pathophysiology Noted neuromuscular;vestibular     Rehab Potential/Prognosis good, to achieve stated therapy goals     Problem List dizziness;decreased endurance;impaired coordination;impaired balance;decreased strength;impaired motor control     Clinical Assessment Examination was limited to a private treatment room with low lighting due to severe headache and light sensitivity.  Pt presents with mild lower extremity strength deficits bilaterally, coordination impairment, decreased static and dynamic balance as well as impaired functional mobility.  Pt requires mostly CGA/min A for walking however did have several LOB requiring mod A to recover.  Pts gait is ataxic with a wide RYAN.  Pt also required min-mod A for bed to chair transfers.  Pt is considered a falls risk due to her scores on FTSTS as well TUG.  Pt will benefit from skill PT in order to address these deficits and maximize level of independence.     Plan and Recommendations Plan to assess  Joseph as well as gaze stabilization.     Planned Services CPT 88502 Therapeutic activities;CPT 32615 Manual therapy;CPT 68813 Neuromuscular Reeducation;CPT 75895 Orthotics/Prosthetics management and training (Subsequent encounters);CPT 90189 Therapeutic exercises;CPT 35490 Hot/Cold Packs therapy;CPT 08771 Gait training;CPT 49927 Electrical stimulation ATTENDED;CPT 06807 Orthotics training (Initial encounter)                General Information - 01/04/22 0816        Session Details    Document Type initial evaluation     Mode of Treatment individual therapy;physical therapy     Patient/Family/Caregiver Comments/Observations Pt was received after OT session in private room with lights low.  Her , Madhu, accompanies her.  Pt and  provided information regarding subjective information - pt lay on mat with lights low due to headache.     OP Specialty Neuro;Vestibular        Time Calculation    Start Time 1405     Stop Time 1500     Time Calculation (min) 55 min        General Information    Onset of Illness/Injury or Date of Surgery 11/22/21     Referring Physician Hilda Lott     Pertinent History of Current Functional Problem Lisa Mast is a pleasant 66 year old woman referred to physical therapy by RAN Alan for malignant neoplasm of the brain resulting in gait and balance abnormalities.   Pt has a history of lung cancer and hypertension.  Pt states she received chemotherapy in Jan 2021 followed by 33 daily radiation treatments in February/March 2021.  She then started Keytruda treatments in May 2021 and continues to gets IV infusions every 6 weeeks.  Her most recent infusion was 12/27/21.   Pt reports in summer 2021, she started having dizziness and headaches.  These symptoms worsened into the fall.  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  Pt underwent surgical removal of mass on 11/22/21 - they state they got all of it however are going to do a gamma knife this Friday  (1/7/22).  Pt reports since the surgery she has been having severe headaches, worsening gait and balance, increased dizziness, nausea, blurred vision, as well as light sensitivity.   Pt states she is unable to walk or transfer without support - she holds onto her  for assistance.  They do have a transport WC they use to get into the bathroom at night as well as for community appointments.  Pt requires assistance with ADLs and iADLs.     Limitations/Impairments safety/cognitive;visual     Existing Precautions/Restrictions fall;chemotherapy;other (see comments)     Precautions comments Light sensitivity                Pain/Vitals - 01/04/22 0816        Pain Assessment    Currently in pain Yes     Preferred Pain Scale number (Numeric Rating Pain Scale)     Pain: Body location Head     Pain Rating (0-10): Pre Activity 8     Pain Rating (0-10): Post Activity 5        Pre Activity Vital Signs    Pulse 94     SpO2 95 %     /78     BP Location Right upper arm     BP Method Manual     Patient Position Sitting        Pain Intervention    Intervention  Reduced lighting, closing eyes     Post Intervention Comments Pt reported reduced headache at end of session                Falls - 01/04/22 0816        Initial Falls Assessment    One or more falls in the last year Yes     How many times 1     Was the patient injured in any fall Yes   Pt fell out of bed and bumped her head - MD aware    Recommended plan to address falls Skilled outpatient PT                PT - 01/04/22 0816        Physical Therapy    Physical Therapy Neuro        PT Plan    Frequency of treatment 2 times/week     PT Duration 3 months     PT Cert From 01/03/22     PT Cert To 04/03/22     Date PT POC was sent to provider 01/04/22                   Living Environment    Living Environment - 01/04/22 0816        Living Environment    People in Home spouse     Living Arrangements house     Living Environment Comment Pt lives in a two-story home with a  first floor set up.  Pt does go upstairs regularly to exercise (gym is on 2nd floor).     Transportation Concerns car, none        Relationship/Environment    Name(s) of People in Home  - Madhu               PLOF:    Prior Level of Function - 01/04/22 0816        OTHER    Previous level of function Prior to surgery, pt was independent with all mobility, ADLs, and iADLs.  Pt was driving and working part-time from home.               Sensory Tests    Sensory Testing - 01/04/22 0816        Sensory Assessment (Somatosensory)    Sensory Assessment (Somatosensory) LE sensation intact     Sensory Subjective Reports other (see comments)   Pt reports her feet get numb at night (chronic issue)              MMT    Manual Muscle Tests - 01/04/22 0816        RIGHT: Lower Extremity Manual Muscle Test Assessment    Hip Flexion gross movement (4/5) good     Hip Extension gross movement (4/5) good     Hip Abduction gross movement (4/5) good     Knee Flexion strength (4+/5) good plus     Knee Extension strength (4+/5) good plus     Ankle Dorsiflexion gross movement (5/5) normal     Ankle Plantarflexion gross movement (4/5) good        LEFT: Lower Extremity Manual Muscle Test Assessment    Hip Flexion gross movement (4/5) good     Hip Extension gross movement (4/5) good     Hip Abduction gross movement (4/5) good     Knee Flexion strength (4+/5) good plus     Knee Extension strength (4+/5) good plus     Ankle Dorsiflexion gross movement (5/5) normal     Ankle Plantarflexion gross movement (4/5) good               Transfers    Transfers - 01/04/22 0816        Bed Mobility    [place], Roll Left modified independence     [place], Roll Right modified independence     [place], Supine to Sit modified independence     [place], Sit to Supine modified independence        Bed to Chair Transfer    [place], Bed to Chair moderate assist (50-74% patient effort);minimum assist (75% or more patient effort)        Chair  to Bed Transfer    [place], Chair to Bed minimum assist (75% or more patient effort);moderate assist (50-74% patient effort)        Sit to Stand Transfer    [place], Sit to Stand Transfer minimum assist (75% or more patient effort)        Stand to Sit Transfer    [place], Stand to Sit Transfer minimum assist (75% or more patient effort)               Gait and Mobility    Gait and Mobility - 01/04/22 0816        Gait Training    [place], Gait minimum assist (75% or more patient effort);moderate assist (50-74% patient effort)     Variable surfaces Flat surface     Assistive Device none     Distance in Feet 10 feet     Deviations/Abnormal Patterns (Gait) ataxic;base of support, wide;bilateral deviations     Comment (Gait/Stairs) Pt completed gait assessment in private room due to light sensitivity therefore distance was limited to space permited (~10 feet).   Pt requires mostly CGA/min A however pt is unsteady and during LOB requires mod A to recover.               Neuromuscular/Motor    Neuromuscular/Motor - 01/04/22 0816        Motor Skills    Motor Skills coordination     Coordination ataxia;dysmetria;finger to nose;upper extremity;lower extremity     Comment: Coordination Impaired alternating toe taps, impaired finger to nose               Balance/Posture    Balance and Posture - 01/04/22 0816        Balance Assessment    Balance Assessment standing static balance     Static Standing Balance moderate impairment;other (see comments)   REO: unable, REC: unable, FA EO: 30 seconds, FA EC: 10 seconds    Balance Test Results (Other) Timed Up and Go Test (TUG);Five Times Sit to Stand (FTSTS)     Five Times to Sit to Stand (FTSTS) 16.5 seconds using B  UE support        Timed Up and Go Test    Trial One: Timed Up and Go Test 14.4     Comment, Timed Up and Go Test no AD; mostly CGA/min A for balance                 Goals     •  Mutually agreed upon pain goal       Mutually agreed upon pain goal: pt will  "report headaches of less than 3/10.       •  Patient stated goal (pt-stated)       \"To return to work, horseback riding, and tennis\"      •  PT Goals 2022         Short Term Goals Time Frame Result Comment/Progress   Patient will be independent with home exercise program. 4 weeks       Patient will report no falls at home or in the community.  4 weeks       Assess 6MWT. 4 weeks       Assess Joseph. 4 weeks       Assess for weighted vest. 4 weeks     Pt will be able to perform transfers with min A.  4 weeks       Pt will be able to ambulate 200' using an AD with min A. 4 weeks          Long Term Goals Time Frame Result Comment/Progress   Patient will perform home exercise program independently.   12 weeks       Patient will be able to perform transfers with supervision. 12 weeks       Patient will be able to ambulate 400' using an AD with supervision. 12 weeks       Patient will perform TUG in 10 seconds or less indicating improved balance and reduced risk for falls.  12 weeks       Pt will demonstrate a 7 point improvement on Joseph indicating improved static balance. 12 weeks       Pt will demonstrate a 150 feet increase in 6 MWT indicating improved endurace. 12 weeks     Patient will be able to perform FTSTS in <12 seconds indicating improving LE strength and balance. 12 weeks                             TREATMENT PLAN:    PT Neuro Exercises Current Session Time   THER ACT   CPT 53510 TOTAL TIME FOR SESSION 8-22 Minutes   Bed mobility    Transfer training    Patient Education Reviewed findings from assessment; discussed plan of care and goals   THER EX  CPT 06835 TOTAL TIME FOR SESSION Not performed   STRETCHING    Stretching by patient    CARDIOVASCULAR    Nu Step    Stationary Bike    Treadmill    Standing Ther-Ex    Supine Ther-Ex    NEURO RE-ED  CPT 56103 TOTAL TIME FOR SESSION Not performed   COORDINATION    POSTURAL RE-ED    PRE-GAIT ACTIVITIES     BALANCE TRAINING     Sitting balance    Standing balance - " static    Standing balance - dynamic    Standing balance - varied surface    GAIT TRAINING  CPT 40560 TOTAL TIME FOR SESSION Not performed   Ambulation with AD     Dynamic gait     Stair negotiation    Curb negotiation    Ramp negotiation    Outdoor ambulation    BWS/vector training    MANUAL  CPT 22495 TOTAL TIME FOR SESSION Not performed   Stretching by therapist/PROM    Mobilization     MODALITIES  CPT 64958 TOTAL TIME FOR SESSION Not performed   Ice    Heat    ATTENDED E-STIM  CPT 88815 TOTAL TIME FOR SESSION Not performed   Attended E-Stim      GROUP  CPT 94754 TOTAL TIME FOR SESSION Not performed                    ASSESSMENT:    This 66 y.o. year old female presents to PT with above stated diagnosis. Physical Therapy evaluation reveals dizziness,decreased endurance,impaired coordination,impaired balance,decreased strength,impaired motor control resulting in   limitations. Lisa Mast will benefit from skilled PT services to address limitation, work towards rehab and patient goals and maximize PLOF of chosen ADLs.     Planned Services: The patient's treatment will include CPT 67301 Therapeutic activities,CPT 70895 Manual therapy,CPT 25564 Neuromuscular Reeducation,CPT 04900 Orthotics/Prosthetics management and training (Subsequent encounters),CPT 02983 Therapeutic exercises,CPT 18901 Hot/Cold Packs therapy,CPT 29141 Gait training,CPT 01266 Electrical stimulation ATTENDED,CPT 73748 Orthotics training (Initial encounter), .

## 2022-01-03 NOTE — OP OT TREATMENT LOG
OT NEURO FLOW SHEET    EXERCISES CURRENT SESSION TIME   NEURO RE-ED TOTAL TIME FOR SESSION Not performed   AAROM/AROM     Strengthening      Strength     Gross Motor Control     Fine Motor Control     Sitting Deniz/Balance     Standing Deniz/Balance      Retraining     THERE ACT TOTAL TIME FOR SESSION Not performed   Pain, Vitals, Meds, Etc.     HEP     Functional Mobility     Transfers     THERE EX TOTAL TIME FOR SESSION Not performed   PROM     Activity Tolerance     SELF-CARE TOTAL TIME FOR SESSION Not performed   Pt Education/Safety     ADL Training     IADL Training     MODALITIES TOTAL TIME FOR SESSION Not performed   Ice/Heat     ATTENDED E-STIM TOTAL TIME FOR SESSION Not performed   Attended E-Stim     SPLINTING TOTAL TIME FOR SESSION Not performed   Fabrication/Check Out     Fit     Training

## 2022-01-04 NOTE — OP PT TREATMENT LOG
PT Neuro Exercises Current Session Time   THER ACT   CPT 85578 TOTAL TIME FOR SESSION 8-22 Minutes   Bed mobility    Transfer training    Patient Education Reviewed findings from assessment; discussed plan of care and goals   THER EX  CPT 93835 TOTAL TIME FOR SESSION Not performed   STRETCHING    Stretching by patient    CARDIOVASCULAR    Nu Step    Stationary Bike    Treadmill    Standing Ther-Ex    Supine Ther-Ex    NEURO RE-ED  CPT 98277 TOTAL TIME FOR SESSION Not performed   COORDINATION    POSTURAL RE-ED    PRE-GAIT ACTIVITIES     BALANCE TRAINING     Sitting balance    Standing balance - static    Standing balance - dynamic    Standing balance - varied surface    GAIT TRAINING  CPT 64414 TOTAL TIME FOR SESSION Not performed   Ambulation with AD     Dynamic gait     Stair negotiation    Curb negotiation    Ramp negotiation    Outdoor ambulation    BWS/vector training    MANUAL  CPT 41907 TOTAL TIME FOR SESSION Not performed   Stretching by therapist/PROM    Mobilization     MODALITIES  CPT 57481 TOTAL TIME FOR SESSION Not performed   Ice    Heat    ATTENDED E-STIM  CPT 32270 TOTAL TIME FOR SESSION Not performed   Attended E-Stim      GROUP  CPT 18661 TOTAL TIME FOR SESSION Not performed

## 2022-01-04 NOTE — PROGRESS NOTES
Referring Provider: By co-signing this Plan of Care (POC) either electronically or physically you agree to the following:    I have reviewed the the Plan of Care established by the therapist within this document and certify that the services are skilled and medically necessary. I have reviewed the plan and recommend that these services continue to meet the goals stated in this document.       EXTERNAL PROVIDER FAXING BACK:    PHYSICIAN SIGNATURE: __________________________________     DATE: ___________________   TIME: _________    IMPORTANT:  If returning this Plan of Care by fax, please fax back ONLY the signature page.   _____________________________________________________________________      [facility] Fax: [phone]      OT EVALUATION FOR OUTPATIENT THERAPY    Patient: Lisa Mast   MRN: [id number]  : 1955 66 y.o.  Referring Physician: Hilda Lott P*  Date of Visit: 1/3/2022    Certification Dates:   22 through 22    Recommended Frequency & Duration:  2 times/week for up to 3 months        Diagnosis:   1. Secondary malignant neoplasm of brain (CMS/HCC)    2. 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 Other specified postprocedural states    4. Other abnormalities of gait and mobility        History of Present Illness: Malignant Neoplasm     Chief Complaints:   Chief Complaint   Patient presents with   • Self Care Difficulties   • Visual Deficits   • Dec Strength   • Balance Deficits   • Pain   • Cognition   • Dec Coordination   • Decreased Mobility   •  Difficulty Performing Work/school Tasks       Precautions: fall,chemotherapy    Past Medical History:   Past Medical History:   Diagnosis Date   • Hypertension    • Lung mass        Past Surgical History:   Past Surgical History:   Procedure Laterality Date   • APPENDECTOMY     • CATARACT EXTRACTION, BILATERAL           LEARNING ASSESSMENT    Assessment completed: Yes    Learner name:   Lisa    Relationship: Patient    Learning Barriers:  Learning barriers: Cognitive    Preferred Language: English     Needed: No    Learning New Concepts: Listening, Reading, Demonstration and Pictures/Video    Education Provided: ON OT POC.       CO-LEARNER ASSESSMENT:    Completed: Yes:   Co-Learner name:  Madhu    Relationship: Significant other    Learning Barriers:  Learning barriers: No Barriers    Preferred Language: English     Needed: No    Learning New Concepts: Listening, Reading, Demonstration and Pictures/Video    Education Provided: On OT POC.           OBJECTIVE MEASUREMENTS/DATA:     Assessment - 01/03/22 1310        Assessment    Plan of Care reviewed and patient/family in agreement Yes     System Pathology/Pathophysiology Noted neuromuscular     Functional Limitations in Following Categories self-care;home management;community/leisure     Problem List: Occupational Therapy pain;impaired balance;strength decreased;coordination impaired;impaired cognition;motor control impaired     Rehab Potential/Prognosis: Occupational Therapy good, to achieve stated therapy goals     Clinical Assessment Lisa Mast is a 66 year old right hand dominant female referred to OP OT following a diagnosis of malignant neoplasm in 11/2021. Pt arrives to evaluation with spouse, Madhu who was present for evaluation. In November, 2021 pt began having headaches. Scans were ran and pt discovered a brain mass growing in the cerebellum. Pt went into [facility] for [facility] on 11/22/21 to remove the tumor. Pt is currently receiving Keytruda infusions every 6 weeks to treat current diagnosis of Lung Cancer. Pt arrives pleasant to evaluation. Reports biggest challenge since removal of the brain tumor has been her balance. Subjectively, pt reports her main goals for OP OT are to improve balance, steadiness during functionals tasks, improve vision impairments and to get rid of headaches. Pt was able to tolerate first 30  minutes of evaluation in a well-lit area, however, following first 30 minutes pt requested to move to another area where the lights could be dimmed due to increase in headache. Pt tolerated seated at EOM for 20 minutes when performing standardized assessment, following end of assessment pt assisted to lying in supine to due to severe 8/10 pain in head. Spouse, reporting pt was due to Tylenol and had forgotten medication at home. Objectively, pt presents with impaired transfers, balance, saccadic speed asses through the Fabio Devick and impaired fine motor coordination and  strength. Recommending further cognitive and visual assessments through OT POC. Spouse reports pt has a history of double vision in left eye. Recommending first few session be performed in a quiet, private room with dim lights. Recommending OP OT services 2 x a week for 12 weeks to improve visual perceptual skills, fine motor coordination and  strength,  and balance during functional tasks.     Plan and Recommendations Recommending OP OT services 2 x a week to improve visual deficits, balance and improved functional activity tolerance with minimal reports of increased headache with performance.     Planned Services CPT 55149 Cognitive skills development/treatment;CPT 64341 Neuromuscular Reeducation;CPT 99640 Orthotics/Prosthetics Management and training (Subsequent encounters);CPT 44803 Self-care/Home management training;CPT 94775 Sensory integration;CPT 89217 Therapeutic activities;CPT 32402 Therapeutic exercises;CPT 10681 Electrical stimulation ATTENDED;CPT 85724 Hot/Cold Packs therapy     Comments/Additional Services BITS, Dynavision, HEP, NMES, NMRE                General Information - 01/03/22 1310        General Information    Document Type initial evaluation     Onset of Illness/Injury or Date of Surgery 11/22/21     Referring Physician Hilda Lott     Pertinent History of Current Functional Problem Lisa Mast is a 66 year  old right hand dominant female referred to OP OT following a diagnosis of malignant neoplasm 11/2021. Pt arrives to evaluation with spouse, Madhu who was present for evaluation. In November, 2021 pt began having headaches. Scans were ran and it pt discovered a brain mass growing in the cerebellum. Pt went into [facility] for surgery on 11/22/21 to remove the tumor. Spouse reports mass has been fully removed, however pt will be going in for a final scope this week. Prior to discovery, pt has been has a history of Lung Cancer. Pt has been receiving Keytruda infusion every 6 weeks to treat her lung cancer. Spouse reports pt began treatment for lung cancer in January 2021. Pt began with 4 rounds of chemotherapy treatment. Pt then began radiation treatment for 33 days in March of 2021 and as of May 2021 pt has been consistently receiving the Keytruda infusion to treat the lung cancer. Prior to brain tumor pt was independent in all ADLs and IADLs. Pt states primary goals for seeking OP OT services are to improve balance, get more steady, improve vision and to get rid of the headaches. Pt complains of headaches worsening to a 7/10 and will take Tylenol to help with the pain.     Patient/Family/Caregiver Comments/Observations Pt arrives to evaluation with spouse, Madhu.     Limitations/Impairments safety/cognitive;visual     Existing Precautions/Restrictions fall;chemotherapy     Precautions comments Recommending quiet private room with lights dim due to patient request and complaints of constant headache.        OT Time Calculation    Start Time 1300     Stop Time 1400     Time Calculation (min) 60 min                Pain/Vitals - 01/03/22 1310        Pain Assessment    Currently in pain Yes   Pt reports severe headaches.    Preferred Pain Scale number (Numeric Rating Pain Scale)     Pain: Body location Head   Reports pain in head was getting better, however most recent 3-4 days has been worse    Pain Rating (0-10): Pre Activity 7      Pain Rating (0-10): Activity 8     Pain Rating (0-10): Post Activity 8        Pre Activity Vital Signs    Pulse 94     SpO2 95 %     Oxygen Therapy None (Room air)     /78     BP Location Right upper arm     BP Method Manual     Patient Position Sitting        Pain Interventions    Intervention  Monitored     Post Intervention Comments Monitored                OT - 01/03/22 2018        Occupational Therapy Encounter Type Details    Occupational Therapy Neuro        OT Frequency and Duration    Frequency of treatment 2 times/week     OT Duration 3 months     OT Cert From 01/03/22     OT Cert To 04/03/22     Date OT POC was sent to provider 01/03/22     Signed OT Plan of Care received?  No                Falls Assessment - 01/03/22 1310        Initial Falls Assessment    One or more falls in the last year Yes     How many times 1     Was the patient injured in any fall Yes   Pt reports she rolled out of bed and banged her head. Reports PCP is aware of fall    Fall prevention interventions recommended [place]ma and re-orient the patient to the environment;Educate and re-educate the patient on safety strategies                 PLOF:    Prior Level of Function - 01/03/22 1310        OTHER    Previous level of function Prior pt was independent in all ADLs and IADLs.               Living Environment    Living Environment - 01/03/22 1310        Living Environment    People in Home spouse     Living Arrangements house     Living Environment Comment First floor master bedroom and bathroom, two story home. One step to enter in front door, two steps to enter in back door.     Transportation Concerns car, none        Relationship/Environment    Name(s) of People in Home Spouse, Madhu.               Range of Motion     PROM/AROM - 01/03/22 1300        RIGHT: Upper Extremity AROM Assessment    Right UE AROM normal WFL        LEFT: Upper Extremity AROM Assessment    Left UE AROM normal WFL               Transfers     Bed  Mobility/Transfers - 01/03/22 2018        OTHER    Comments Min-Mod SPT from w/c to EOM and return to w/c from EOM.               BADL/IADL    BADL/IADL - 01/03/22 1310        BADL Interventions Assessment    Upper Body Dressing Close supervision     Lower Body Dressing Close supervision;Minimum assist (75% patient effort)     Lower body dressing comments Due to pt decrease in balance, spouse provides min-close by assistance.     Bathing Modified independence;Close supervision     Bathing comments Pt has a shower chair and spouse provides assistance in and out of the tub.     Toileting Close supervision;Modified independence     Toileting comments Grab bars in bathroom.     Grooming Independent     Eating Independent        IADL/Home Interventions Assessment    Driving and community mobility Dependent (less than 25% patient effort)     Driving and community mobility comments Spouse primarily provides transportation. Patient is not cleared to resume driving.               Gross and Fine Motor     Gross and Fine Motor - 01/03/22 1310        Gross Motor Control Assessment    Motor Control tests 9 Hole Peg     9 Hole Peg Test - COMMENTS R: 44.14 seconds (pt required verbal cues to  1 at a time after picking up two pegs. L: 44.27 seconds pt required verbal cues for removing pegs once finished     Comment, Gross Motor Coordination Right hand dominant.        Hand  Strength Testing    Left Hand, Setting 2 38, 40, 40 = 39.3 lb average     Right Hand, Setting 2 40, 35, 25 = 33.3 lb average               Vision     Vision - 01/03/22 1310        Vision Assessment    Visual Impairment/Limitations corrective lenses for reading;diplopia   diplopia left eye    Impact of Vision Impairment on Function Pt reports vision concerns. November 2021 saw an opthamologist when in the [facility]. Spouse reports pt has double vision in left eye.        Saccadic Fixation Speed    Fabio Devick Test #1 (seconds) 38.25 Seconds      "Fabio Devick Test #2 (seconds) 54.53 Seconds     Fabio Devick Test #3 (seconds) 67.27 Seconds     Fabio Devick Total Time 160.05 Seconds     Fabio Devick Errors #1 1     Fabio Devick Errors #2 0     Fabio Devick Errors #3 6     Fabio Devick Total Errors 7                   GOALS:    Goals     • OT Goals         Pt stated goals \"to be more steady, get rid of headaches and improve vision\"    .  Short Term Goals Time Frame Result Comment/Progress   Assess Horizontal and Vertical Saccadic speed, Dynavision, Trial Making and/or MoCA and establish goal as needed 2-4 weeks     Improve B UE  strength by 5 lb to maximize functional capabilities  2-4 weeks     Decrease B UE 9 Hole Peg Test time by 10 seconds to maximize functional capabilities during fine motor tasks.  4-6 weeks     Improve standing tolerance to 10 minutes to maximize independence in ADLs/IADLs during functional tasks 4-6 weeks     Improve functional transfers to close supervision to maximize independence and safety during ADL tasks   4-6 weeks     Pt will be participate in progressively monitored  home exercise program to achieve goal attainment.  4-6 weeks     Improve functional visual scanning and saccadic speed via Fabio Devick Assessment to within 10 seconds.  4-6 weeks          Short Term Goals Time Frame Result Comment/Progress   Improve B UE  strength by 10 lb to maximize functional capabilities  By discharge     Decrease B UE 9 Hole Peg Test time by 15 seconds to maximize functional capabilities during fine motor tasks.  By discharge     Improve standing tolerance to 30 minutes to maximize independence in ADLs/IADLs during functional tasks By discharge     Improve functional transfers to Independent level to maximize independence and safety during ADL tasks   By discharge     Improve functional visual scanning and saccadic speed via Fabio Devick Assessment to within 30 seconds.      By discharge     Pt will be independent in progressively monitored  " home exercise program to achieve goal attainment.  By discharge                      TREATMENT PLAN:    OT NEURO FLOW SHEET    EXERCISES CURRENT SESSION TIME   NEURO RE-ED TOTAL TIME FOR SESSION Not performed   AAROM/AROM     Strengthening      Strength     Gross Motor Control     Fine Motor Control     Sitting Deniz/Balance     Standing Deniz/Balance      Retraining     THERE ACT TOTAL TIME FOR SESSION Not performed   Pain, Vitals, Meds, Etc.     HEP     Functional Mobility     Transfers     THERE EX TOTAL TIME FOR SESSION Not performed   PROM     Activity Tolerance     SELF-CARE TOTAL TIME FOR SESSION Not performed   Pt Education/Safety     ADL Training     IADL Training     MODALITIES TOTAL TIME FOR SESSION Not performed   Ice/Heat     ATTENDED E-STIM TOTAL TIME FOR SESSION Not performed   Attended E-Stim     SPLINTING TOTAL TIME FOR SESSION Not performed   Fabrication/Check Out     Fit     Training             This 66 y.o. year old female presents to OT with above stated diagnosis. Occupational Therapy evaluation reveals pain,impaired balance,strength decreased,coordination impaired,impaired cognition,motor control impaired resulting in self-care,home management,community/leisure limitations. Lisa Mast will benefit from skilled OT services to address limitation, work towards rehab and patient goals and maximize PLOF of chosen ADLs.     Planned Services: The patient’s treatment will include CPT 61866 Cognitive skills development/treatment,CPT 88920 Neuromuscular Reeducation,CPT 90011 Orthotics/Prosthetics Management and training (Subsequent encounters),CPT 53602 Self-care/Home management training,CPT 21802 Sensory integration,CPT 03503 Therapeutic activities,CPT 06348 Therapeutic exercises,CPT 79448 Electrical stimulation ATTENDED,CPT 38709 Hot/Cold Packs therapy,BITS, Dynavision, HEP, NMES, NMRE.

## 2022-01-04 NOTE — PROGRESS NOTES
Referring Provider: By co-signing this Plan of Care (POC) either electronically or physically you agree to the following:    I have reviewed the the Plan of Care established by the therapist within this document and certify that the services are skilled and medically necessary. I have reviewed the plan and recommend that these services continue to meet the goals stated in this document.       EXTERNAL PROVIDER FAXING BACK:    PHYSICIAN SIGNATURE: __________________________________     DATE: ___________________  TIME: _____________    IMPORTANT:  If returning this Plan of Care by fax, please fax back ONLY the signature page.   _________________________________________________________________________      [facility] Fax: [phone]        PT EVALUATION FOR OUTPATIENT THERAPY    Patient: Lisa Mast    MRN: [id number]  : 1955 66 y.o.   Referring Physician: Hilda Lott P*  Date of Visit: 1/3/2022      Certification Dates:  22 through 22         Recommended Frequency & Duration:  2 times/week for up to 3 months     Diagnosis:   1. Secondary malignant neoplasm of brain (CMS/HCC)    2. 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 Other specified postprocedural states    4. Other abnormalities of gait and mobility        Chief Complaints:   Chief Complaint   Patient presents with   • Difficulty Walking   • Balance Deficits   • Dec Strength   • Dizziness       Precautions: fall,chemotherapy,other (see comments)    Past Medical History:   Past Medical History:   Diagnosis Date   • Hypertension    • Lung cancer (CMS/HCC)    • Lung mass        Past Surgical History:   Past Surgical History:   Procedure Laterality Date   • APPENDECTOMY     • BRAIN SURGERY      cerebellar mass resection   • CATARACT EXTRACTION, BILATERAL           LEARNING ASSESSMENT    Assessment completed:  Yes    Learner name:  Lisa Mast    Relationship: Patient    Learning Barriers:   Learning barriers: No Barriers    Preferred Language: English     Needed: No    Learning New Concepts: Listening, Reading, Demonstration and Pictures/Video    Education Provided: See below.      CO-LEARNER ASSESSMENT:    Completed: Yes:   Co-Learner name:  Madhu     Relationship: Significant other    Learning Barriers:  Learning barriers: No Barriers    Preferred Language: English     Needed: No    Learning New Concepts: Listening, Reading, Demonstration and Pictures/Video    Education Provided:             OBJECTIVE MEASUREMENTS/DATA:     Assessment and Plan - 01/04/22 0816        Assessment    Plan of Care reviewed and patient/family in agreement Yes     System Pathology/Pathophysiology Noted neuromuscular;vestibular     Rehab Potential/Prognosis good, to achieve stated therapy goals     Problem List dizziness;decreased endurance;impaired coordination;impaired balance;decreased strength;impaired motor control     Clinical Assessment Examination was limited to a private treatment room with low lighting due to severe headache and light sensitivity.  Pt presents with mild lower extremity strength deficits bilaterally, coordination impairment, decreased static and dynamic balance as well as impaired functional mobility.  Pt requires mostly CGA/min A for walking however did have several LOB requiring mod A to recover.  Pts gait is ataxic with a wide RYAN.  Pt also required min-mod A for bed to chair transfers.  Pt is considered a falls risk due to her scores on FTSTS as well TUG.  Pt will benefit from skill PT in order to address these deficits and maximize level of independence.     Plan and Recommendations Plan to assess Joseph as well as gaze stabilization.     Planned Services CPT 55309 Therapeutic activities;CPT 40747 Manual therapy;CPT 47339 Neuromuscular Reeducation;CPT 26666 Orthotics/Prosthetics management and training (Subsequent encounters);CPT 91726 Therapeutic exercises;CPT 81597 Hot/Cold  Packs therapy;CPT 35929 Gait training;CPT 20920 Electrical stimulation ATTENDED;CPT 79103 Orthotics training (Initial encounter)                General Information - 01/04/22 0816        Session Details    Document Type initial evaluation     Mode of Treatment individual therapy;physical therapy     Patient/Family/Caregiver Comments/Observations Pt was received after OT session in private room with lights low.  Her , Madhu, accompanies her.  Pt and  provided information regarding subjective information - pt lay on mat with lights low due to headache.     OP Specialty Neuro;Vestibular        Time Calculation    Start Time 1405     Stop Time 1500     Time Calculation (min) 55 min        General Information    Onset of Illness/Injury or Date of Surgery 11/22/21     Referring Physician Hilda Lott     Pertinent History of Current Functional Problem Lisa Mast is a pleasant 66 year old woman referred to physical therapy by RAN Alan for malignant neoplasm of the brain resulting in gait and balance abnormalities.   Pt has a history of lung cancer and hypertension.  Pt states she received chemotherapy in Jan 2021 followed by 33 daily radiation treatments in February/March 2021.  She then started Keytruda treatments in May 2021 and continues to gets IV infusions every 6 weeeks.  Her most recent infusion was 12/27/21.   Pt reports in summer 2021, she started having dizziness and headaches.  These symptoms worsened into the fall.  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  Pt underwent surgical removal of mass on 11/22/21 - they state they got all of it however are going to do a gamma knife this Friday (1/7/22).  Pt reports since the surgery she has been having severe headaches, worsening gait and balance, increased dizziness, nausea, blurred vision, as well as light sensitivity.   Pt states she is unable to walk or transfer without support - she holds onto her  for assistance.   They do have a transport WC they use to get into the bathroom at night as well as for community appointments.  Pt requires assistance with ADLs and iADLs.     Limitations/Impairments safety/cognitive;visual     Existing Precautions/Restrictions fall;chemotherapy;other (see comments)     Precautions comments Light sensitivity                Pain/Vitals - 01/04/22 0816        Pain Assessment    Currently in pain Yes     Preferred Pain Scale number (Numeric Rating Pain Scale)     Pain: Body location Head     Pain Rating (0-10): Pre Activity 8     Pain Rating (0-10): Post Activity 5        Pre Activity Vital Signs    Pulse 94     SpO2 95 %     /78     BP Location Right upper arm     BP Method Manual     Patient Position Sitting        Pain Intervention    Intervention  Reduced lighting, closing eyes     Post Intervention Comments Pt reported reduced headache at end of session                Falls - 01/04/22 0816        Initial Falls Assessment    One or more falls in the last year Yes     How many times 1     Was the patient injured in any fall Yes   Pt fell out of bed and bumped her head - MD aware    Recommended plan to address falls Skilled outpatient PT                PT - 01/04/22 0816        Physical Therapy    Physical Therapy Neuro        PT Plan    Frequency of treatment 2 times/week     PT Duration 3 months     PT Cert From 01/03/22     PT Cert To 04/03/22     Date PT POC was sent to provider 01/04/22                   Living Environment    Living Environment - 01/04/22 0816        Living Environment    People in Home spouse     Living Arrangements house     Living Environment Comment Pt lives in a two-story home with a first floor set up.  Pt does go upstairs regularly to exercise (gym is on 2nd floor).     Transportation Concerns car, none        Relationship/Environment    Name(s) of People in Home  - Madhu               PLOF:    Prior Level of Function - 01/04/22 0816        OTHER    Previous  level of function Prior to surgery, pt was independent with all mobility, ADLs, and iADLs.  Pt was driving and working part-time from home.               Sensory Tests    Sensory Testing - 01/04/22 0816        Sensory Assessment (Somatosensory)    Sensory Assessment (Somatosensory) LE sensation intact     Sensory Subjective Reports other (see comments)   Pt reports her feet get numb at night (chronic issue)              MMT    Manual Muscle Tests - 01/04/22 0816        RIGHT: Lower Extremity Manual Muscle Test Assessment    Hip Flexion gross movement (4/5) good     Hip Extension gross movement (4/5) good     Hip Abduction gross movement (4/5) good     Knee Flexion strength (4+/5) good plus     Knee Extension strength (4+/5) good plus     Ankle Dorsiflexion gross movement (5/5) normal     Ankle Plantarflexion gross movement (4/5) good        LEFT: Lower Extremity Manual Muscle Test Assessment    Hip Flexion gross movement (4/5) good     Hip Extension gross movement (4/5) good     Hip Abduction gross movement (4/5) good     Knee Flexion strength (4+/5) good plus     Knee Extension strength (4+/5) good plus     Ankle Dorsiflexion gross movement (5/5) normal     Ankle Plantarflexion gross movement (4/5) good               Transfers    Transfers - 01/04/22 0816        Bed Mobility    [place], Roll Left modified independence     [place], Roll Right modified independence     [place], Supine to Sit modified independence     [place], Sit to Supine modified independence        Bed to Chair Transfer    [place], Bed to Chair moderate assist (50-74% patient effort);minimum assist (75% or more patient effort)        Chair to Bed Transfer    [place], Chair to Bed minimum assist (75% or more patient effort);moderate assist (50-74% patient effort)        Sit to Stand Transfer    [place], Sit to Stand Transfer minimum assist (75% or more patient effort)        Stand to Sit Transfer    [place],  "Stand to Sit Transfer minimum assist (75% or more patient effort)               Gait and Mobility    Gait and Mobility - 01/04/22 0816        Gait Training    [place], Gait minimum assist (75% or more patient effort);moderate assist (50-74% patient effort)     Variable surfaces Flat surface     Assistive Device none     Distance in Feet 10 feet     Deviations/Abnormal Patterns (Gait) ataxic;base of support, wide;bilateral deviations     Comment (Gait/Stairs) Pt completed gait assessment in private room due to light sensitivity therefore distance was limited to space permited (~10 feet).   Pt requires mostly CGA/min A however pt is unsteady and during LOB requires mod A to recover.               Neuromuscular/Motor    Neuromuscular/Motor - 01/04/22 0816        Motor Skills    Motor Skills coordination     Coordination ataxia;dysmetria;finger to nose;upper extremity;lower extremity     Comment: Coordination Impaired alternating toe taps, impaired finger to nose               Balance/Posture    Balance and Posture - 01/04/22 0816        Balance Assessment    Balance Assessment standing static balance     Static Standing Balance moderate impairment;other (see comments)   REO: unable, REC: unable, FA EO: 30 seconds, FA EC: 10 seconds    Balance Test Results (Other) Timed Up and Go Test (TUG);Five Times Sit to Stand (FTSTS)     Five Times to Sit to Stand (FTSTS) 16.5 seconds using B  UE support        Timed Up and Go Test    Trial One: Timed Up and Go Test 14.4     Comment, Timed Up and Go Test no AD; mostly CGA/min A for balance                 Goals     •  Mutually agreed upon pain goal       Mutually agreed upon pain goal: pt will report headaches of less than 3/10.       •  Patient stated goal (pt-stated)       \"To return to work, horseback riding, and tennis\"      •  PT Goals 2022         Short Term Goals Time Frame Result Comment/Progress   Patient will be independent with home exercise program. 4 weeks     "   Patient will report no falls at home or in the community.  4 weeks       Assess 6MWT. 4 weeks       Assess Joseph. 4 weeks       Assess for weighted vest. 4 weeks     Pt will be able to perform transfers with min A.  4 weeks       Pt will be able to ambulate 200' using an AD with min A. 4 weeks          Long Term Goals Time Frame Result Comment/Progress   Patient will perform home exercise program independently.   12 weeks       Patient will be able to perform transfers with supervision. 12 weeks       Patient will be able to ambulate 400' using an AD with supervision. 12 weeks       Patient will perform TUG in 10 seconds or less indicating improved balance and reduced risk for falls.  12 weeks       Pt will demonstrate a 7 point improvement on Joseph indicating improved static balance. 12 weeks       Pt will demonstrate a 150 feet increase in 6 MWT indicating improved endurace. 12 weeks     Patient will be able to perform FTSTS in <12 seconds indicating improving LE strength and balance. 12 weeks                             TREATMENT PLAN:    PT Neuro Exercises Current Session Time   THER ACT   CPT 26579 TOTAL TIME FOR SESSION 8-22 Minutes   Bed mobility    Transfer training    Patient Education Reviewed findings from assessment; discussed plan of care and goals   THER EX  CPT 04428 TOTAL TIME FOR SESSION Not performed   STRETCHING    Stretching by patient    CARDIOVASCULAR    Nu Step    Stationary Bike    Treadmill    Standing Ther-Ex    Supine Ther-Ex    NEURO RE-ED  CPT 80391 TOTAL TIME FOR SESSION Not performed   COORDINATION    POSTURAL RE-ED    PRE-GAIT ACTIVITIES     BALANCE TRAINING     Sitting balance    Standing balance - static    Standing balance - dynamic    Standing balance - varied surface    GAIT TRAINING  CPT 30186 TOTAL TIME FOR SESSION Not performed   Ambulation with AD     Dynamic gait     Stair negotiation    Curb negotiation    Ramp negotiation    Outdoor ambulation    BWS/vector training     MANUAL  CPT 74627 TOTAL TIME FOR SESSION Not performed   Stretching by therapist/PROM    Mobilization     MODALITIES  CPT 74849 TOTAL TIME FOR SESSION Not performed   Ice    Heat    ATTENDED E-STIM  CPT 07347 TOTAL TIME FOR SESSION Not performed   Attended E-Stim      GROUP  CPT 57939 TOTAL TIME FOR SESSION Not performed                    ASSESSMENT:    This 66 y.o. year old female presents to PT with above stated diagnosis. Physical Therapy evaluation reveals dizziness,decreased endurance,impaired coordination,impaired balance,decreased strength,impaired motor control resulting in   limitations. Lisa Mast will benefit from skilled PT services to address limitation, work towards rehab and patient goals and maximize PLOF of chosen ADLs.     Planned Services: The patient's treatment will include CPT 45309 Therapeutic activities,CPT 02681 Manual therapy,CPT 65741 Neuromuscular Reeducation,CPT 12294 Orthotics/Prosthetics management and training (Subsequent encounters),CPT 30844 Therapeutic exercises,CPT 60038 Hot/Cold Packs therapy,CPT 64386 Gait training,CPT 57837 Electrical stimulation ATTENDED,CPT 07219 Orthotics training (Initial encounter), .

## 2022-01-10 ENCOUNTER — HOSPITAL ENCOUNTER (INPATIENT)
Facility: REHABILITATION | Age: 67
LOS: 6 days | Discharge: ANOTHER HOSPITAL | DRG: 949 | End: 2022-01-16
Attending: PHYSICAL MEDICINE & REHABILITATION | Admitting: PHYSICAL MEDICINE & REHABILITATION
Payer: COMMERCIAL

## 2022-01-10 ENCOUNTER — BMR PREADMISSION ASSESSMENT (OUTPATIENT)
Dept: ADMISSIONS | Facility: REHABILITATION | Age: 67
End: 2022-01-10
Payer: COMMERCIAL

## 2022-01-10 VITALS
WEIGHT: 124.3 LBS | DIASTOLIC BLOOD PRESSURE: 70 MMHG | BODY MASS INDEX: 18.41 KG/M2 | OXYGEN SATURATION: 96 % | SYSTOLIC BLOOD PRESSURE: 108 MMHG | TEMPERATURE: 97.5 F | HEART RATE: 78 BPM | HEIGHT: 69 IN

## 2022-01-10 DIAGNOSIS — F43.21 ADJUSTMENT DISORDER WITH DEPRESSED MOOD: ICD-10-CM

## 2022-01-10 PROBLEM — C79.9 METASTATIC ADENOCARCINOMA (CMS/HCC): Status: ACTIVE | Noted: 2022-01-10

## 2022-01-10 PROBLEM — G93.89 BRAIN MASS: Status: ACTIVE | Noted: 2021-11-21

## 2022-01-10 PROBLEM — Z91.89 AT HIGH RISK FOR DEEP VENOUS THROMBOSIS: Status: ACTIVE | Noted: 2022-01-10

## 2022-01-10 PROBLEM — C34.90 LUNG CANCER (CMS/HCC): Status: ACTIVE | Noted: 2021-02-13

## 2022-01-10 PROBLEM — E87.1 CHRONIC HYPONATREMIA: Status: ACTIVE | Noted: 2022-01-10

## 2022-01-10 PROBLEM — R52 PAIN: Status: ACTIVE | Noted: 2022-01-10

## 2022-01-10 PROBLEM — C78.6 SECONDARY MALIGNANT NEOPLASM OF RETROPERITONEUM AND PERITONEUM (CMS/HCC): Status: ACTIVE | Noted: 2021-08-11

## 2022-01-10 PROBLEM — C79.51 MALIGNANT NEOPLASM METASTATIC TO BONE (CMS/HCC): Status: ACTIVE | Noted: 2021-02-09

## 2022-01-10 PROBLEM — Z91.89 AT HIGH RISK FOR PRESSURE INJURY OF SKIN: Status: ACTIVE | Noted: 2022-01-10

## 2022-01-10 PROBLEM — Z51.11 ENCOUNTER FOR ANTINEOPLASTIC CHEMOTHERAPY: Status: ACTIVE | Noted: 2021-03-11

## 2022-01-10 PROBLEM — Z91.81 RISK FOR FALLS: Status: ACTIVE | Noted: 2022-01-10

## 2022-01-10 PROBLEM — C79.31 BRAIN METASTASES: Status: ACTIVE | Noted: 2022-01-10

## 2022-01-10 PROBLEM — G91.9 HYDROCEPHALUS (CMS/HCC): Status: ACTIVE | Noted: 2022-01-10

## 2022-01-10 LAB
BUN SERPL-MCNC: 18 MG/DL
CHLORIDE SERPL-SCNC: 95 MEQ/L
CO2 SERPL-SCNC: 30 MEQ/L
CREAT SERPL-MCNC: 0.49 MG/DL
EXTERNAL HEMATOCRIT: 36 %
EXTERNAL HEMOGLOBIN: 12.2 G/DL
EXTERNAL PLATELET COUNT: 302 K/ΜL
EXTERNAL WBC: 7.1 G/DL
GLUCOSE SERPL-MCNC: 121 MG/DL
POTASSIUM SERPL-SCNC: 4.6 MEQ/L
SODIUM SERPL-SCNC: 133 MEQ/L

## 2022-01-10 PROCEDURE — 63600000 HC DRUGS/DETAIL CODE: Performed by: PHYSICAL MEDICINE & REHABILITATION

## 2022-01-10 PROCEDURE — 63700000 HC SELF-ADMINISTRABLE DRUG: Performed by: PHYSICAL MEDICINE & REHABILITATION

## 2022-01-10 PROCEDURE — 12800001 HC ROOM AND CARE SEMIPRIVATE REHAB-BRAIN INJ

## 2022-01-10 RX ORDER — OXYCODONE HYDROCHLORIDE 5 MG/1
5 TABLET ORAL EVERY 6 HOURS PRN
Status: DISCONTINUED | OUTPATIENT
Start: 2022-01-10 | End: 2022-01-13

## 2022-01-10 RX ORDER — CHLORHEXIDINE GLUCONATE ORAL RINSE 1.2 MG/ML
15 SOLUTION DENTAL 2 TIMES DAILY
Status: DISCONTINUED | OUTPATIENT
Start: 2022-01-10 | End: 2022-01-17 | Stop reason: HOSPADM

## 2022-01-10 RX ORDER — DEXAMETHASONE 2 MG/1
2 TABLET ORAL 2 TIMES DAILY
Status: DISCONTINUED | OUTPATIENT
Start: 2022-01-15 | End: 2022-01-13

## 2022-01-10 RX ORDER — ACETAMINOPHEN 325 MG/1
650 TABLET ORAL EVERY 4 HOURS PRN
Status: DISCONTINUED | OUTPATIENT
Start: 2022-01-10 | End: 2022-01-13

## 2022-01-10 RX ORDER — DEXAMETHASONE 2 MG/1
2 TABLET ORAL EVERY 12 HOURS
Status: DISCONTINUED | OUTPATIENT
Start: 2022-01-10 | End: 2022-01-10

## 2022-01-10 RX ORDER — DEXAMETHASONE 2 MG/1
2 TABLET ORAL 4 TIMES DAILY
Status: DISCONTINUED | OUTPATIENT
Start: 2022-01-12 | End: 2022-01-13

## 2022-01-10 RX ORDER — DEXTROSE 40 %
15-30 GEL (GRAM) ORAL AS NEEDED
Status: DISCONTINUED | OUTPATIENT
Start: 2022-01-10 | End: 2022-01-11

## 2022-01-10 RX ORDER — SENNOSIDES 8.6 MG/1
2 TABLET ORAL DAILY
Status: DISCONTINUED | OUTPATIENT
Start: 2022-01-11 | End: 2022-01-17 | Stop reason: HOSPADM

## 2022-01-10 RX ORDER — LOSARTAN POTASSIUM 25 MG/1
25 TABLET ORAL DAILY
Status: DISCONTINUED | OUTPATIENT
Start: 2022-01-11 | End: 2022-01-11

## 2022-01-10 RX ORDER — DEXTROSE 50 % IN WATER (D50W) INTRAVENOUS SYRINGE
25 AS NEEDED
Status: DISCONTINUED | OUTPATIENT
Start: 2022-01-10 | End: 2022-01-11

## 2022-01-10 RX ORDER — HEPARIN SODIUM 5000 [USP'U]/ML
5000 INJECTION, SOLUTION INTRAVENOUS; SUBCUTANEOUS EVERY 8 HOURS
Status: COMPLETED | OUTPATIENT
Start: 2022-01-10 | End: 2022-01-14

## 2022-01-10 RX ORDER — DOCUSATE SODIUM 100 MG/1
100 CAPSULE, LIQUID FILLED ORAL 2 TIMES DAILY
Status: DISCONTINUED | OUTPATIENT
Start: 2022-01-11 | End: 2022-01-17 | Stop reason: HOSPADM

## 2022-01-10 RX ORDER — PANTOPRAZOLE SODIUM 40 MG/1
40 TABLET, DELAYED RELEASE ORAL DAILY
Status: DISCONTINUED | OUTPATIENT
Start: 2022-01-11 | End: 2022-01-17 | Stop reason: HOSPADM

## 2022-01-10 RX ORDER — ONDANSETRON 4 MG/1
4 TABLET, ORALLY DISINTEGRATING ORAL EVERY 8 HOURS PRN
Status: DISCONTINUED | OUTPATIENT
Start: 2022-01-10 | End: 2022-01-17 | Stop reason: HOSPADM

## 2022-01-10 RX ORDER — IBUPROFEN 200 MG
16-32 TABLET ORAL AS NEEDED
Status: DISCONTINUED | OUTPATIENT
Start: 2022-01-10 | End: 2022-01-11

## 2022-01-10 RX ORDER — DEXAMETHASONE 4 MG/1
4 TABLET ORAL 4 TIMES DAILY
Status: COMPLETED | OUTPATIENT
Start: 2022-01-10 | End: 2022-01-11

## 2022-01-10 RX ORDER — DEXAMETHASONE 2 MG/1
2 TABLET ORAL DAILY
Status: DISCONTINUED | OUTPATIENT
Start: 2022-01-18 | End: 2022-01-13

## 2022-01-10 RX ORDER — OXYCODONE HYDROCHLORIDE 5 MG/1
10 TABLET ORAL EVERY 6 HOURS PRN
Status: DISCONTINUED | OUTPATIENT
Start: 2022-01-10 | End: 2022-01-13

## 2022-01-10 RX ORDER — DEXAMETHASONE 4 MG/1
4 TABLET ORAL ONCE
Status: COMPLETED | OUTPATIENT
Start: 2022-01-10 | End: 2022-01-10

## 2022-01-10 RX ADMIN — DEXAMETHASONE 4 MG: 4 TABLET ORAL at 18:36

## 2022-01-10 RX ADMIN — DEXAMETHASONE 4 MG: 4 TABLET ORAL at 22:16

## 2022-01-10 RX ADMIN — HEPARIN SODIUM 5000 UNITS: 5000 INJECTION, SOLUTION INTRAVENOUS; SUBCUTANEOUS at 22:16

## 2022-01-10 RX ADMIN — CHLORHEXIDINE GLUCONATE 0.12% ORAL RINSE 15 ML: 1.2 LIQUID ORAL at 22:16

## 2022-01-10 ASSESSMENT — PATIENT HEALTH QUESTIONNAIRE - PHQ9: SUM OF ALL RESPONSES TO PHQ9 QUESTIONS 1 & 2: 0

## 2022-01-10 NOTE — ASSESSMENT & PLAN NOTE
Holding home Keytruda, can restart in 2 weeks  -Medical Oncologist: Dr. Wang Batista ([facility])  -Radiation Oncologist: Dr. Mckinney

## 2022-01-10 NOTE — BMR PREADMISSION NOTE
[facility]  [facility]    Patient Name:  Lisa Mast  YOB: 1955    Referral Date:  01/10/22  Evaluation Date:  01/10/22  Referring Facility Admission Date: 01/04/22  Referring Facility: [facility]   Referring Provider: Burke Gabriel MD     Reason for Referral: Lisa Mast is a 66 y.o. female whose primary indication for inpatient rehabilitation is Brain Injury.     Pertinent History of Current Functional Problem:  Patient assessed using modified protocols established during the COVID19 pandemic.    66 yy.o. 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, radiation pneumonitis, esophageal stricture, and heart murmur presents for POV s/p SOC for resection of metastasis on 11/22/2021 presents with persistent headaches and worsening gait instability found to have hydrocephalus, pseudomeningocele, and interval increase of bleeding into resection cavity and is now s/p RF VPS, Delta 1.0 (1/6)    Neurosurgery note 1/10:   #Left cerebellar brain mass s/p SOC (11/22/2021) c/b hydrocephalus, pseudomeningocele, resection cavity hemorrhage now s/p RF VPS, Delta 1.0 (1/6)  -Neuro Checks Q4H  -Dexamethasone x 2 week taper to off   -Head CT (1/4): see results  -Post-op Head CT (1/6): see results  -MRI brain (1/5):  per Oncology given postponed GK session, see results  -GKS originally scheduled for (1/7): rad-onc aware   -Rad-Onc and Med-Onc following as below     #Pain Mangement  -Tylenol PRN  -Oxycodone PRN  -Flexeril PRN     #Lung cancer (CMS-HCC)  -Holding home Keytruda, can restart in 2 weeks  -Medical Oncologist: Dr. Wang Batista ([facility])  -Radiation Oncologist: Dr. Mckinney, recently seen by Dr. Mills ([facility])     #Hyponatremia  -Not fluid responsive / Chronic  -Urine osm 317 / Urine Na 86 / Serum Osm 274 / Serum Na 133 (1/5)  -Fluid restriction 2L discontinued (1/8)  -Trend     #Post op Vision changes  -Post op (11/22): double /  blurry vison, left eye ptosis, left eye nystagmus  -Seen by Ophthalmology last admission, scheduled for outpatient follow up in February     #Hypertension  -Continue home Losartan   -Labetalol IV PRN (last 1/7 AM)     #Esophagitis  -Continue home Lansoprazole      #Prophylaxis  -Per protocol     #Disposition  -PT/OT recommends acute rehab     Current meds:  chlorhexidine gluconate 4% topical liquid 1 application 1 application, topical, Daily   dexamethasone tablet 2 mg  2 mg, PO, Daily   dexamethasone tablet 2 mg  2 mg, PO, Q12H   dexamethasone tablet 2 mg  2 mg, PO, Q6H   dexamethasone tablet 4 mg  4 mg, PO, Q6H   heparin injection 5,000 Units 5,000 Units, SC, Q8H   insulin aspart (NovoLOG) 100 UNIT/ML injection 0-6 Units 0-6 Units, SC, With meals & bedtime   lansoprazole DR capsule 30 mg 30 mg, PO, Daily pre breakfast   losartan tablet 25 mg 25 mg, PO, Daily   senna tablet 8.6 mg 8.6 mg, PO, Daily   PRN  Medication Ordered Dose/Rate, Route, Frequency   acetaminophen tablet 650 mg 650 mg, PO, Q6H PRN   cyclobenzaprine tablet 5 mg 5 mg, PO, Q8H PRN   ondansetron 4 mg/2 mL injection 4 mg 4 mg, IV, Q6H PRN   oxyCODONE IR tablet 10 mg  10 mg, PO, Q4H PRN   oxyCODONE IR tablet 5 mg  5 mg, PO, Q4H PRN   polyethylene glycol packet 17 g 17 g, PO, Daily PRN   sodium chloride 0.65% nasal spray 1 spray 1 spray, both nostril, Q2H PRN       Pt is AA&Ox3-4, some forgetfulness. VSS on RA,  required Labetalol IV x1 for HTN on 1/8. Home Losartan restarted. Episode of epistaxis over weekend, resolved on its own. Gamma knife postponed until after rehab. Holding home Keytruda, Onc to give restart plan. On Decadron taper. Pain controlled on PO meds. Participating with therapies, mostly Taye with rec for acute rehab prior to home with supportive/hands-on spouse.           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     No medication comments found.    HISTORY:    Past Medical History:   Diagnosis Date   • Hypertension    • Lung cancer (CMS/HCC)    • Lung mass      Past Surgical History:   Procedure Laterality Date   • APPENDECTOMY     • BRAIN SURGERY      cerebellar mass resection   • CATARACT EXTRACTION, BILATERAL       Tobacco Use as of 1/10/2022     Smoking Status Smoking Start Date Smoking Quit Date Packs/Day Years Used    Former Smoker -- -- -- --    Types Comments Smokeless Tobacco Status Smokeless Tobacco Quit Date Source    -- -- Never Used -- Provider            Alcohol Use as of 1/10/2022     Alcohol Use Drinks/Week Alcohol/Week Comments Source    Yes   -- -- Provider            Drug Use as of 1/10/2022     Drug Use Types Frequency Comments Source    -- -- -- -- Provider            Sexual Activity as of 1/10/2022     Sexually Active Birth Control Partners Comments Source    -- -- -- -- Provider            Socioeconomic as of 1/10/2022     Marital Status Spouse Name Number of Children Years Education Education Level Preferred Language Ethnicity Race Source     -- -- -- -- English Not , /a, or [place] origin White Provider        Allergies  Allergies   Allergen Reactions   • Codeine      Other reaction(s): Unknown  Headaches         Premorbid Functional Status:   Dominant Hand: right  Ambulation: assistive person ( assists)  Transferring: assistive person (transfer chair to  at night)  Toileting: independent  Bathing: assistive person  Dressing: assistive person ( was giving light  assist)  Eating: independent  Communication: understands/communicates without difficulty  Swallowing: swallows foods/liquids without difficulty  Baseline Diet/Method of Nutritional Intake: no diet restrictions  Past History of Dysphagia: No  Assistive Device/Animal Currently Used at Home: other (see comments) (transport chair)  Prior Level of Function Comment: CGA/Taye from spouse    Living Environment:  People in Home: spouse  Current Living Arrangements: home  Number of Stairs, Main Entrance: 2  Number of Stairs, Within Home, Primary: 12 (Patients B&B on 1st floor)    TEST RESULTS:  Chemistry (Up to last 3 results from the past 720 hours)      01/10 0000    Sodium       133         Potassium       4.6         BUN       18         Creatinine       0.49         Glucose       121         CO2       30         Chloride       95           Hepatic (Up to last 3 results from the past 720 hours)    None      Metabolic (Up to last 3 results from the past 720 hours)    None      Hematologic (Up to last 3 results from the past 720 hours)      01/10 0000    WBC       7.1         Hemoglobin       12.2         Hematocrit       36         Platelets       302           Other (Up to last 3 results from the past 720 hours)    None        Diagnostics: CT,MRI,CXR  CT Study Details: CTH wo IV contrast  CT Date: 01/06/22  CT Result: 1.  Postoperative changes of right frontal approach ventricular shunt placement. Unchanged confirmation of ventricular system. 2.  Postsurgical changes of suboccipital craniotomy and tumor resection with associated postoperative sequelae an hemorrhage in the operative bed-fourth ventricle, unchanged compared to most recent prior CT.  CXR Study Details: Chest 2 views  CXR Date: 01/04/22  CXR Result: Left upper lobe mass, with associated treatment-related changes, better assessed on prior chest CT.  MRI Study Details: Head w and wo IV contrast-metastasis  MRI Date: 01/05/22  MRI Result: Motion degraded  examination. Motion artifact limits assessment for residual tumor and new sites of metastatic disease. Status post suboccipital craniectomy and tumor resection. Continued interval enlargement of pseudomeningocele (incompletely imaged) as described with overall unchanged degree of hydrocephalus. Redemonstrated hemorrhage in the operative bed and fourth ventricle appears unchanged.    ASSESSMENT:  Vitals:  Temp:  [36.4 °C (97.5 °F)] 36.4 °C (97.5 °F)  Heart Rate:  [78] 78  BP: (108)/(70) 108/70    Lines/Drains/Airways:  Lines, Drains & Airways: Peripheral IV,Wound,Incision  Incision Date: 01/06/22  Incision Comments: umbilicus- glue  Peripheral Iv Insertion Date: 01/07/22 (L upper arm)  Wound Date: 11/22/21 (post midline head)    Risk for Clinical Complications:  Constipation: Moderate  DVT: Moderate  Falls: Moderate  Infection: Moderate    Precautions:  Existing Precautions/Restrictions: fall; aspiration      Current Diet:   Diet: thin liquids,regular solids    Current Functional Status:   Preadmission Current Function     Row Name 01/09/22 1253       Cognition/Psychosocial    Orientation Status (Cognition) oriented x 3;oriented x 4  sometimes forgetfull    Follows Commands (Cognition) follows one-step commands;WNL    Cognitive Function safety deficit    Safety Deficit (Cognition) insight into deficits/self-awareness       Motor Speech    Speech Intelligibility (Motor Speech) WFL;phrase/sentence level       Auditory Comprehension    Follows Commands (Auditory Comprehension) WNL;1-step command    Yes/No Questions (Auditory Comprehension) WNL;biographical/personal questions;simple/factual questions       Verbal Expression    Automatic Speech (Verbal Expression) WFL    Comment, Assessment (Verbal Expression) No SLP eval done       Swallowing Recommendations    Diet Consistency Recommendations thin liquids;regular    Medication Administration crushed with pureed  jello       Basic Activities of Daily Living (BADLs)     Basic Activities of Daily Living grooming;toileting       Toileting    [place] minimum assist (75% or more patient effort)    Position supported sitting    Comment clothing mgmt       Grooming    Self-Performance oral care (brushing teeth, cleaning dentures)    [place] minimum assist (75% or more patient effort)    Position sink side;unsupported standing       Bed Mobility    [place], Supine to Sit minimum assist (75% or more patient effort)       Sit to Stand Transfer    [place], Sit to Stand Transfer minimum assist (75% or more patient effort)    Assistive Device none       Stand to Sit Transfer    [place], Stand to Sit Transfer minimum assist (75% or more patient effort)    Comment HHA       Toilet Transfer    [place], Toilet Transfer minimum assist (75% or more patient effort)    Assistive Device grab bars/safety frame       Gait Training    [place], Gait minimum assist (75% or more patient effort);2 person assist  Taye 1-2    Distance in Feet 20 feet  x2    Pattern (Gait) step-through    Deviations/Abnormal Patterns (Gait) base of support, narrow;new decreased;step length decreased    Comment (Gait/Stairs) unsteady, HHA       Stairs Training    [place], Stairs not tested                Support System:  Designated Primary Caregiver: spouse Demarcus [phone]  Availability, Primary Caregiver: available most of time, some coverage needed  Health, Primary Caregiver: elderly, but no significant health issues  Physical Limitations, Primary Caregiver: no physical limitations; can supervise activity and provide some assistance  Caregiver Engagement: advocates for the patient      Patient/Family Goals:  Patient's Goals For Discharge: return home; return to all previous roles/activities  Family Goals For Discharge: patient able to provide self-care with only supervision; patient able to return to all previous activities/roles; patient able to provide self-care  independently      Educational Background:      RECOMMENDATIONS / PLAN:  Special Needs:  Is an  Needed/Used?: N  Spiritual, Cultural Beliefs, Buddhism Practices, Values that Affect Care: no  DNR is current code status at referring facility?: No      Plan:  Identified Referral Needs: physical therapy,occupational therapy,medical consultative services,neuropsychologist,speech language pathology  OT Frequency: 5-7 times per week  OT Intensity: 1.5 hours  PT Frequency: 5-7 times per week  PT Intensity: 1.5 hours  SLP Frequency: 3-5 times per week (check out)  SLP Intensity: 0.5 hours    Therapy Intensity: Requires, can tolerate and will benefit from 3 hours of therapy at least 5 days per week  Projected Length of Stay (days): 14 days  Patient is willing to participate in rehab program: yes    Impairments to be addressed: cognitive function,mobility,motor dysfunction,safety,self-care  Medical Necessity Admission Criteria: abnormal labs,orthostasis/unstable blood pressure,unstable blood sugar    Expected Level of Function at Discharge:  Expected Functional Improvement: cognitive function; mobility; motor dysfunction; safety; self-care  Self-Care: Setup or clean-up assistance  Sphincter Control: Independent  Transfers: Setup or clean-up assistance  Locomotion: Setup or clean-up assistance  Communication: Independent  Social Cognition: Independent      Post-Discharge Needs:  Concerns to be Addressed: care coordination/care conferences,discharge planning,caregiver training  Anticipated Discharge Disposition: home with assistance,home with home health  Type of Home Care Services: home PT,home OT,nursing  Equipment Needed After Discharge: other (see comments) (TBD)  Current Discharge Risk: physical impairment,chronically ill  Discharge Transition, Temporary Housing Plan: none needed

## 2022-01-10 NOTE — PLAN OF CARE
Plan of Care Review  Plan of Care Reviewed With: patient,spouse  Progress: no change  Outcome Summary: Lisa arrival to [facility], AO. BP elevated- Dr. Alamo at bedside notified. No current pain. Educated on call light- settled in bed at this time.

## 2022-01-10 NOTE — HOSPITAL COURSE
Patient assessed using modified protocols established during the COVID19 pandemic.    66 yy.o. 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, radiation pneumonitis, esophageal stricture, and heart murmur presents for POV s/p SOC for resection of metastasis on 11/22/2021 presents with persistent headaches and worsening gait instability found to have hydrocephalus, pseudomeningocele, and interval increase of bleeding into resection cavity and is now s/p RF VPS, Delta 1.0 (1/6)    Neurosurgery note 1/10:   #Left cerebellar brain mass s/p SOC (11/22/2021) c/b hydrocephalus, pseudomeningocele, resection cavity hemorrhage now s/p RF VPS, Delta 1.0 (1/6)  -Neuro Checks Q4H  -Dexamethasone x 2 week taper to off   -Head CT (1/4): see results  -Post-op Head CT (1/6): see results  -MRI brain (1/5):  per Oncology given postponed GK session, see results  -GKS originally scheduled for (1/7): rad-onc aware   -Rad-Onc and Med-Onc following as below     #Pain Mangement  -Tylenol PRN  -Oxycodone PRN  -Flexeril PRN     #Lung cancer (CMS-HCC)  -Holding home Keytruda, can restart in 2 weeks  -Medical Oncologist: Dr. Wang Batista ([facility])  -Radiation Oncologist: Dr. Mckinney, recently seen by Dr. Mills ([facility])     #Hyponatremia  -Not fluid responsive / Chronic  -Urine osm 317 / Urine Na 86 / Serum Osm 274 / Serum Na 133 (1/5)  -Fluid restriction 2L discontinued (1/8)  -Trend     #Post op Vision changes  -Post op (11/22): double / blurry vison, left eye ptosis, left eye nystagmus  -Seen by Ophthalmology last admission, scheduled for outpatient follow up in February     #Hypertension  -Continue home Losartan   -Labetalol IV PRN (last 1/7 AM)     #Esophagitis  -Continue home Lansoprazole      #Prophylaxis  -Per protocol     #Disposition  -PT/OT recommends acute rehab     Current meds:  chlorhexidine gluconate 4% topical liquid 1 application 1 application, topical, Daily   dexamethasone tablet 2 mg  2 mg, PO, Daily    dexamethasone tablet 2 mg  2 mg, PO, Q12H   dexamethasone tablet 2 mg  2 mg, PO, Q6H   dexamethasone tablet 4 mg  4 mg, PO, Q6H   heparin injection 5,000 Units 5,000 Units, SC, Q8H   insulin aspart (NovoLOG) 100 UNIT/ML injection 0-6 Units 0-6 Units, SC, With meals & bedtime   lansoprazole DR capsule 30 mg 30 mg, PO, Daily pre breakfast   losartan tablet 25 mg 25 mg, PO, Daily   senna tablet 8.6 mg 8.6 mg, PO, Daily   PRN  Medication Ordered Dose/Rate, Route, Frequency   acetaminophen tablet 650 mg 650 mg, PO, Q6H PRN   cyclobenzaprine tablet 5 mg 5 mg, PO, Q8H PRN   ondansetron 4 mg/2 mL injection 4 mg 4 mg, IV, Q6H PRN   oxyCODONE IR tablet 10 mg  10 mg, PO, Q4H PRN   oxyCODONE IR tablet 5 mg  5 mg, PO, Q4H PRN   polyethylene glycol packet 17 g 17 g, PO, Daily PRN   sodium chloride 0.65% nasal spray 1 spray 1 spray, both nostril, Q2H PRN       Pt is AA&Ox3-4, some forgetfulness. VSS on RA,  required Labetalol IV x1 for HTN on 1/8. Home Losartan restarted. Episode of epistaxis over weekend, resolved on its own. Gamma knife postponed until after rehab. Holding home Keytruda Onc to give restart plan. On Decadron taper. Pain controlled on PO meds. Participating with therapies, mostly Taye with rec for acute rehab prior to home with supportive/hands-on spouse.

## 2022-01-11 ENCOUNTER — APPOINTMENT (INPATIENT)
Dept: PHYSICAL THERAPY | Facility: REHABILITATION | Age: 67
DRG: 949 | End: 2022-01-11
Payer: COMMERCIAL

## 2022-01-11 ENCOUNTER — APPOINTMENT (INPATIENT)
Dept: OCCUPATIONAL THERAPY | Facility: REHABILITATION | Age: 67
DRG: 949 | End: 2022-01-11
Payer: COMMERCIAL

## 2022-01-11 ENCOUNTER — APPOINTMENT (OUTPATIENT)
Dept: PSYCHOLOGY | Facility: CLINIC | Age: 67
End: 2022-01-11
Attending: PHYSICAL MEDICINE & REHABILITATION
Payer: COMMERCIAL

## 2022-01-11 LAB
ALBUMIN SERPL-MCNC: 3.4 G/DL (ref 3.4–5)
ALP SERPL-CCNC: 56 IU/L (ref 35–126)
ALT SERPL-CCNC: 15 IU/L (ref 11–54)
ANION GAP SERPL CALC-SCNC: 10 MEQ/L (ref 3–15)
AST SERPL-CCNC: 14 IU/L (ref 15–41)
BASOPHILS # BLD: 0.02 K/UL (ref 0.01–0.1)
BASOPHILS NFR BLD: 0.3 %
BILIRUB SERPL-MCNC: 0.5 MG/DL (ref 0.3–1.2)
BILIRUB UR QL STRIP.AUTO: NEGATIVE MG/DL
BNP SERPL-MCNC: 197 PG/ML
BUN SERPL-MCNC: 15 MG/DL (ref 8–20)
CALCIUM SERPL-MCNC: 9.5 MG/DL (ref 8.9–10.3)
CHLORIDE SERPL-SCNC: 94 MEQ/L (ref 98–109)
CLARITY UR REFRACT.AUTO: CLEAR
CO2 SERPL-SCNC: 29 MEQ/L (ref 22–32)
COLOR UR AUTO: YELLOW
CREAT SERPL-MCNC: 0.4 MG/DL (ref 0.6–1.1)
CRP SERPL-MCNC: 7.7 MG/L
DIFFERENTIAL METHOD BLD: ABNORMAL
EOSINOPHIL # BLD: 0.01 K/UL (ref 0.04–0.36)
EOSINOPHIL NFR BLD: 0.1 %
ERYTHROCYTE [DISTWIDTH] IN BLOOD BY AUTOMATED COUNT: 18.5 % (ref 11.7–14.4)
GFR SERPL CREATININE-BSD FRML MDRD: >60 ML/MIN/1.73M*2
GLUCOSE BLD-MCNC: 118 MG/DL (ref 70–99)
GLUCOSE BLD-MCNC: 127 MG/DL (ref 70–99)
GLUCOSE SERPL-MCNC: 117 MG/DL (ref 70–99)
GLUCOSE UR STRIP.AUTO-MCNC: NEGATIVE MG/DL
HCT VFR BLDCO AUTO: 37.4 % (ref 35–45)
HGB BLD-MCNC: 12.5 G/DL (ref 11.8–15.7)
HGB UR QL STRIP.AUTO: NEGATIVE
IMM GRANULOCYTES # BLD AUTO: 0.12 K/UL (ref 0–0.08)
IMM GRANULOCYTES NFR BLD AUTO: 1.5 %
KETONES UR STRIP.AUTO-MCNC: NEGATIVE MG/DL
LEUKOCYTE ESTERASE UR QL STRIP.AUTO: NEGATIVE
LYMPHOCYTES # BLD: 0.86 K/UL (ref 1.2–3.5)
LYMPHOCYTES NFR BLD: 10.9 %
MAGNESIUM SERPL-MCNC: 1.9 MG/DL (ref 1.8–2.5)
MCH RBC QN AUTO: 29.3 PG (ref 28–33.2)
MCHC RBC AUTO-ENTMCNC: 33.4 G/DL (ref 32.2–35.5)
MCV RBC AUTO: 87.8 FL (ref 83–98)
MONOCYTES # BLD: 0.62 K/UL (ref 0.28–0.8)
MONOCYTES NFR BLD: 7.9 %
NEUTROPHILS # BLD: 6.23 K/UL (ref 1.7–7)
NEUTS SEG NFR BLD: 79.3 %
NITRITE UR QL STRIP.AUTO: NEGATIVE
NRBC BLD-RTO: 0 %
PDW BLD AUTO: 9.6 FL (ref 9.4–12.3)
PH UR STRIP.AUTO: 7 [PH]
PLATELET # BLD AUTO: 298 K/UL (ref 150–369)
POCT TEST: ABNORMAL
POCT TEST: ABNORMAL
POTASSIUM SERPL-SCNC: 4.2 MEQ/L (ref 3.6–5.1)
PROT SERPL-MCNC: 6.2 G/DL (ref 6–8.2)
PROT UR QL STRIP.AUTO: NEGATIVE
RBC # BLD AUTO: 4.26 M/UL (ref 3.93–5.22)
SODIUM SERPL-SCNC: 133 MEQ/L (ref 136–144)
SP GR UR REFRACT.AUTO: 1.02
UROBILINOGEN UR STRIP-ACNC: 0.2 EU/DL
WBC # BLD AUTO: 7.86 K/UL (ref 3.8–10.5)

## 2022-01-11 PROCEDURE — 97166 OT EVAL MOD COMPLEX 45 MIN: CPT | Mod: GO

## 2022-01-11 PROCEDURE — 97162 PT EVAL MOD COMPLEX 30 MIN: CPT | Mod: GP

## 2022-01-11 PROCEDURE — 85025 COMPLETE CBC W/AUTO DIFF WBC: CPT | Performed by: PHYSICAL MEDICINE & REHABILITATION

## 2022-01-11 PROCEDURE — 83880 ASSAY OF NATRIURETIC PEPTIDE: CPT | Performed by: INTERNAL MEDICINE

## 2022-01-11 PROCEDURE — 36415 COLL VENOUS BLD VENIPUNCTURE: CPT | Performed by: PHYSICAL MEDICINE & REHABILITATION

## 2022-01-11 PROCEDURE — 63700000 HC SELF-ADMINISTRABLE DRUG: Performed by: PHYSICAL MEDICINE & REHABILITATION

## 2022-01-11 PROCEDURE — 80053 COMPREHEN METABOLIC PANEL: CPT | Performed by: PHYSICAL MEDICINE & REHABILITATION

## 2022-01-11 PROCEDURE — 86140 C-REACTIVE PROTEIN: CPT | Performed by: INTERNAL MEDICINE

## 2022-01-11 PROCEDURE — 90791 PSYCH DIAGNOSTIC EVALUATION: CPT | Performed by: PSYCHOLOGIST

## 2022-01-11 PROCEDURE — 92610 EVALUATE SWALLOWING FUNCTION: CPT | Mod: GN

## 2022-01-11 PROCEDURE — 63600000 HC DRUGS/DETAIL CODE: Performed by: PHYSICAL MEDICINE & REHABILITATION

## 2022-01-11 PROCEDURE — 12800001 HC ROOM AND CARE SEMIPRIVATE REHAB-BRAIN INJ

## 2022-01-11 PROCEDURE — 81003 URINALYSIS AUTO W/O SCOPE: CPT | Performed by: INTERNAL MEDICINE

## 2022-01-11 PROCEDURE — 63700000 HC SELF-ADMINISTRABLE DRUG: Performed by: INTERNAL MEDICINE

## 2022-01-11 PROCEDURE — 92523 SPEECH SOUND LANG COMPREHEN: CPT | Mod: GN

## 2022-01-11 PROCEDURE — 83735 ASSAY OF MAGNESIUM: CPT | Performed by: INTERNAL MEDICINE

## 2022-01-11 RX ORDER — LOSARTAN POTASSIUM 25 MG/1
25 TABLET ORAL ONCE
Status: COMPLETED | OUTPATIENT
Start: 2022-01-11 | End: 2022-01-11

## 2022-01-11 RX ORDER — LOSARTAN POTASSIUM 50 MG/1
50 TABLET ORAL
Status: DISCONTINUED | OUTPATIENT
Start: 2022-01-12 | End: 2022-01-17 | Stop reason: HOSPADM

## 2022-01-11 RX ORDER — CALCIUM CARBONATE 200(500)MG
500 TABLET,CHEWABLE ORAL 3 TIMES DAILY PRN
Status: DISCONTINUED | OUTPATIENT
Start: 2022-01-11 | End: 2022-01-17 | Stop reason: HOSPADM

## 2022-01-11 RX ORDER — INSULIN ASPART 100 [IU]/ML
1-10 INJECTION, SOLUTION INTRAVENOUS; SUBCUTANEOUS
Status: DISCONTINUED | OUTPATIENT
Start: 2022-01-11 | End: 2022-01-17 | Stop reason: HOSPADM

## 2022-01-11 RX ADMIN — LOSARTAN POTASSIUM 25 MG: 25 TABLET, FILM COATED ORAL at 07:41

## 2022-01-11 RX ADMIN — OXYCODONE HYDROCHLORIDE 10 MG: 5 TABLET ORAL at 21:22

## 2022-01-11 RX ADMIN — DEXAMETHASONE 4 MG: 4 TABLET ORAL at 16:58

## 2022-01-11 RX ADMIN — DOCUSATE SODIUM 100 MG: 100 CAPSULE, LIQUID FILLED ORAL at 07:41

## 2022-01-11 RX ADMIN — HEPARIN SODIUM 5000 UNITS: 5000 INJECTION, SOLUTION INTRAVENOUS; SUBCUTANEOUS at 12:41

## 2022-01-11 RX ADMIN — HEPARIN SODIUM 5000 UNITS: 5000 INJECTION, SOLUTION INTRAVENOUS; SUBCUTANEOUS at 21:23

## 2022-01-11 RX ADMIN — HEPARIN SODIUM 5000 UNITS: 5000 INJECTION, SOLUTION INTRAVENOUS; SUBCUTANEOUS at 06:06

## 2022-01-11 RX ADMIN — SENNOSIDES 2 TABLET: 8.6 TABLET, FILM COATED ORAL at 07:41

## 2022-01-11 RX ADMIN — ACETAMINOPHEN 650 MG: 325 TABLET, FILM COATED ORAL at 06:21

## 2022-01-11 RX ADMIN — DEXAMETHASONE 4 MG: 4 TABLET ORAL at 11:20

## 2022-01-11 RX ADMIN — LOSARTAN POTASSIUM 25 MG: 25 TABLET, FILM COATED ORAL at 11:20

## 2022-01-11 RX ADMIN — OXYCODONE HYDROCHLORIDE 10 MG: 5 TABLET ORAL at 12:39

## 2022-01-11 RX ADMIN — CHLORHEXIDINE GLUCONATE 0.12% ORAL RINSE 15 ML: 1.2 LIQUID ORAL at 07:41

## 2022-01-11 RX ADMIN — ACETAMINOPHEN 650 MG: 325 TABLET, FILM COATED ORAL at 11:20

## 2022-01-11 RX ADMIN — PANTOPRAZOLE SODIUM 40 MG: 40 TABLET, DELAYED RELEASE ORAL at 07:41

## 2022-01-11 RX ADMIN — DOCUSATE SODIUM 100 MG: 100 CAPSULE, LIQUID FILLED ORAL at 21:22

## 2022-01-11 RX ADMIN — CHLORHEXIDINE GLUCONATE 0.12% ORAL RINSE 15 ML: 1.2 LIQUID ORAL at 21:23

## 2022-01-11 RX ADMIN — ACETAMINOPHEN 650 MG: 325 TABLET, FILM COATED ORAL at 17:02

## 2022-01-11 RX ADMIN — OXYCODONE HYDROCHLORIDE 5 MG: 5 TABLET ORAL at 06:21

## 2022-01-11 RX ADMIN — DEXAMETHASONE 4 MG: 4 TABLET ORAL at 21:22

## 2022-01-11 RX ADMIN — DEXAMETHASONE 4 MG: 4 TABLET ORAL at 07:41

## 2022-01-11 SDOH — ECONOMIC STABILITY: FOOD INSECURITY: WITHIN THE PAST 12 MONTHS, THE FOOD YOU BOUGHT JUST DIDN'T LAST AND YOU DIDN'T HAVE MONEY TO GET MORE.: NEVER TRUE

## 2022-01-11 SDOH — ECONOMIC STABILITY: FOOD INSECURITY: WITHIN THE PAST 12 MONTHS, YOU WORRIED THAT YOUR FOOD WOULD RUN OUT BEFORE YOU GOT MONEY TO BUY MORE.: NEVER TRUE

## 2022-01-11 ASSESSMENT — COGNITIVE AND FUNCTIONAL STATUS - GENERAL
IMPULSE CONTROL: IMPAIRED, MINIMALLY
CONCENTRATION: WNL
ORIENTATION: FULLY ORIENTED
PSYCHOMOTOR FUNCTIONING: WNL
THOUGHT_CONTENT: GUARDED
AFFECT: TENSE
EYE_CONTACT: WNL
INSIGHT: INTACT
RECENT MEMORY: WNL
APPETITE: NO CHANGE
MOOD: FRUSTRATED
AFFECT: FLAT/BLUNTED AFFECT
AFFECT: FLAT/BLUNTED AFFECT;SAD/DEPRESSED AFFECT
AROUSAL LEVEL: ALERT
EST. PREMORBID INTELLIGENCE: AVERAGE
SPEECH: REGULAR
PERCEPTUAL FUNCTION: VISUAL IMPAIRMENT
APPEARANCE: WELL GROOMED

## 2022-01-11 NOTE — PROGRESS NOTES
Patient: Lisa Mast  Location: [facility] [address]  MRN: [id number]  Today's date: 1/11/2022    History of Present Illness  Lisa is a 66 y.o. female admitted on 1/10/2022 with Brain metastases (CMS/HCC) [C79.31]. Principal problem is Brain metastases (CMS/HCC).    66 yy.o. 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, radiation pneumonitis, esophageal stricture, and heart murmur presents for POV s/p SOC for resection of metastasis on 11/22/2021 presents with persistent headaches and worsening gait instability found to have hydrocephalus, pseudomeningocele, and interval increase of bleeding into resection cavity and is now s/p RF VPS, Delta 1.0 (1/6)     Neurosurgery note 1/10:   #Left cerebellar brain mass s/p SOC (11/22/2021) c/b hydrocephalus, pseudomeningocele, resection cavity hemorrhage now s/p RF VPS, Delta 1.0 (1/6)    #Post op Vision changes  -Post op (11/22): double / blurry vison, left eye ptosis, left eye nystagmus  -Seen by Ophthalmology last admission, scheduled for outpatient follow up in February    Past Medical History  Lisa has a past medical history of Hypertension, Lung cancer (CMS/HCC), and Lung mass.      OT Vitals    Date/Time Pulse HR Source SpO2 Pt Activity O2 Therapy BP BP Location BP Method Pt Position [facility]   01/11/22 0732 67 Monitor 96 % At rest None (Room air) 155/85 Left upper arm Automatic Lying LM   01/11/22 0825 66 Monitor 97 % At rest None (Room air) 133/70 Left upper arm Automatic Lying LM      OT Pain    Date/Time Pain Type Location Rating: Rest Rating: Activity Interventions [facility]   01/11/22 0732 Pain Assessment head 7 7 premedicated for activity LM   01/11/22 0825 Pain Reassessment head 7 7 premedicated for activity LM          Prior Living Environment      Most Recent Value   People in Home spouse   Current Living Arrangements home   Living Environment Comment [facility]. Pt resides on 1st floor   Number of Stairs, Main Entrance 1   Stair  Railings, Main Entrance none   Stairs Comment, Main Entrance Reports  would assist PTA   Location, Patient Bedroom first (main) floor   Location, Bathroom first (main) floor   Bathroom Access Comment Walk in shower with small threshold. Has shower chair and grab bars. Grab bars by toilet          Prior Level of Function      Most Recent Value   Dominant Hand right   Ambulation assistive person   Transferring assistive person   Toileting assistive person   Bathing independent   Dressing independent  [ setup clothing]   Prior Level of Function Comment CGA/Taye from spouse. Pt lives with  in [place], enjoys taking walks and playing tennis,  Pt is health  and works full time,  Pt shares responsibility of managing household finances and scheduling calendars.  They own property in [place] which pt helps to manage   Assistive Device Currently Used at Home grab bar, shower chair, other (see comments)  [transport chair]          Occupational Profile      Most Recent Value   Successful Occupations Works as . +   Occupational History/Life Experiences Enjoys playing tennis, rides horses   Environmental Supports and Barriers Supportive    Patient Goals Improve my balance, vision           IRF OT Evaluation and Treatment - 01/11/22 0721        OT Time Calculation    Start Time 0730     Stop Time 0830     Time Calculation (min) 60 min        Session Details    Document Type initial evaluation     Mode of Treatment occupational therapy;individual therapy        General Information    Patient Profile Reviewed yes     General Observations of Patient Recieved supine in bed; agreeable to ADL     Existing Precautions/Restrictions fall     Limitations/Impairments safety/cognitive        Living Environment    Name(s) of People in Home Madhu     Primary Care Provided by self        Cognition/Psychosocial    Affect/Mental Status (Cognition) flat/blunted affect     Follows  Commands (Cognition) WFL     Comment, Cognition BIMS 15/15. Intermittent confusion/STM noted during evaluation. Impulsive with mobility tasks. Decrease safety awareness        [place] Cognitive Assessment (MoCA©)    Comments TBA        Vision Assessment/Intervention    Visual Impairment/Limitations corrective lenses for reading     OT Adult Perceptual Screening Test (OT-APST) Pt reports double/blurry vision since surgery in november. Screen TBA        Sensory Assessment (Somatosensory)    Left UE Sensory Assessment proprioception;intact;light touch awareness     Right UE Sensory Assessment light touch awareness;proprioception;intact        Range of Motion (ROM)    Left Upper Extremity (ROM) left UE ROM is WFL     Right Upper Extremity (ROM) right UE ROM is WFL        Strength (Manual Muscle Testing)    Shoulder, Left (Strength) 4     Elbow, Left (Strength) 4     Wrist, Left (Strength) 4     Shoulder, Right (Strength) 4     Elbow, Right (Strength) 4     Wrist, Right (Strength) 4        Bed Mobility    Comment (Bed Mobility) Impulsive. Steady A c bedrails        Bed to Chair Transfer    [place], Bed to Chair minimum assist (75% or more patient effort)     Verbal Cues safety     Assistive Device wheelchair     Comment SPT        Chair to Bed Transfer    [place], Chair to Bed minimum assist (75% or more patient effort)     Verbal Cues safety     Assistive Device wheelchair     Comment SPT. + impulsivity        Sit to Stand Transfer    [place], Sit to Stand Transfer minimum assist (75% or more patient effort)     Verbal Cues safety     Assistive Device wheelchair     Comment in ADL context        Stand to Sit Transfer    [place], Stand to Sit Transfer minimum assist (75% or more patient effort)     Verbal Cues safety     Assistive Device wheelchair     Comment in ADL context        Toilet Transfer    [place], Toilet Transfer minimum assist (75% or more patient effort)     Comment Per nursing  "report        Shower Transfer    Transfer Technique stand pivot     [place], Shower Transfer minimum assist (75% or more patient effort)     Verbal Cues safety     Assistive Device grab bars/tub rail;shower chair;wheelchair     Comment Already using DME prior to eval. Min A SPT        Balance    Comment, Balance CS seated; Steady A-min A in stance during ADL        Motor Skills    Comment, Motor Skills [facility] TBA        Bathing    Self-Performance chest;left arm;right arm;abdomen;front perineal area;buttocks;left upper leg;right upper leg;left lower leg, including foot;right lower leg, including foot     [place] Assistance buttocks     [place] minimum assist (75% or more patient effort)     Position supported sitting;supported standing     Setup Assistance adaptive equipment setup;adjust water temperature/flow;obtain supplies     Adaptive Equipment grab bar/tub rail;hand-held shower spray hose;shower chair     Comment Min A for balance while washing of buttocks. Pt refusing baby shampoo for haircare despite incision and education on use. Pt stating \"I've been using regular shampoo for 2 weeks, I don't need that stuff\"        Upper Body Dressing    Tasks pull over garment     Self-Performance threads left arm, shirt;threads right arm, shirt;pulls shirt over head/around back;pulls shirt down/adjusts     [place] Assistance obtains clothes     [place] close supervision     Position supported sitting     Adaptive Equipment none     Comment in w/c        Lower Body Dressing    Tasks pants/bottoms;socks;underwear     Self-Performance threads left leg, underpants;threads right leg, underpants;pulls underpants up or down;threads left leg, pants/shorts;threads right leg, pants/shorts;pulls pants/shorts up or down;dons/doffs left sock;dons/doffs right sock     [place] Assistance obtains clothes;pulls underpants up or down     [place] minimum assist (75% or more patient effort)     Position supported " sitting;unsupported standing     Adaptive Equipment none     [place], Footwear close supervision     Comment A for pulling up underwear. Cues for safety to sit down prior to threading BLEs        Grooming    Self-Performance washes, rinses and dries face;washes, rinses and dries hands;brushes/wolf hair;oral care (brushing teeth, cleaning dentures)     [place] close supervision     Position supported sitting     Setup Assistance obtain supplies     Adaptive Equipment none     [place], Oral Hygiene close supervision     Comment Seated level        Toileting    [place] minimum assist (75% or more patient effort)     Comment Per nursing report        Patient/Family Goals    Patient's Goals For Discharge return home;take care of myself at home;return to all previous roles/activities        Therapy Assessment/Plan (OT)    Rehab Potential/Prognosis (OT) good, to achieve stated therapy goals     Frequency of Treatment (OT) 5-7 times per week;60-90 minutes per day     Estimated Duration of Therapy (OT) 3 weeks     Problem List (OT) balance;coordination;mobility;pain;vision;cognition     Activity Limitations Related to Problem List unable to ambulate safely;unable to transfer safely;BADLs not performed adequately or safely;IADLs not performed adequately or safely;community activities not performed adequately or safely;home management activity not performed adequately or safely     Planned Therapy Interventions activity tolerance training;adaptive equipment training;BADL retraining;cognitive/visual perception retraining;edema control/reduction;functional balance retraining;IADL retraining;manual therapy/joint mobilization;neuromuscular control/coordination retraining;occupation/activity based interventions;passive ROM/stretching;patient/caregiver education/training;ROM/therapeutic exercise;strengthening exercise;transfer/mobility retraining     Comment, Therapy Assessment/Plan (OT) Pt is a 66 year old  female with hx of resection to brain mets (L cerebllar mass) on 11/22. Pt reporting increase headaches and weakness. Pt underwent VPS placement on 1/6. PTA she resides with  in [facility], but uses 1st floor setup. He provides A for patient care. Pt currently requires min A for functional transfers and up to min A for self-care needs. Pt would benefit from skilled IP OT in order to improve the above deficits, reduce burden of care, and improve quality of life.                      Education Documentation  Orientation to Care Setting, Routine, taught by Giovanna Hagen OT at 1/11/2022 12:30 PM.  Learner: Patient  Readiness: Acceptance  Method: Explanation  Response: Verbalizes Understanding  Comment: Educated on OT role, POC, goals, call bell use, 3 hour rule          IRF OT Goals      Most Recent Value   Transfer Goal 1    Activity/Assistive Device toilet at 01/11/2022 0721   [place] supervision required  [CS] at 01/11/2022 0721   Time Frame short-term goal (STG), 5 - 7 days at 01/11/2022 0721   Transfer Goal 2    Activity/Assistive Device toilet at 01/11/2022 0721   [place] modified independence at 01/11/2022 0721   Time Frame long-term goal (LTG), 21 days or less at 01/11/2022 0721   Transfer Goal 3    Activity/Assistive Device shower at 01/11/2022 0721   [place] supervision required  [CS] at 01/11/2022 0721   Time Frame short-term goal (STG), 5 - 7 days at 01/11/2022 0721   Transfer Goal 4    Activity/Assistive Device shower at 01/11/2022 0721   [place] supervision required at 01/11/2022 0721   Time Frame long-term goal (LTG), 21 days or less at 01/11/2022 0721   Bathing Goal 1    Activity/Assistive Device bathing skills, all at 01/11/2022 0721   [place] supervision required  [CS] at 01/11/2022 0721   Time Frame short-term goal (STG), 5 - 7 days at 01/11/2022 0721   Bathing Goal 2    Activity/Assistive Device bathing skills, all at 01/11/2022 0721   [place] supervision required at  01/11/2022 0721   Time Frame long-term goal (LTG), 21 days or less at 01/11/2022 0721   UB Dressing Goal 1    Activity/Assistive Device upper body dressing at 01/11/2022 0721   [place] supervision required  [CS with item retrieval] at 01/11/2022 0721   Time Frame short-term goal (STG), 5 - 7 days at 01/11/2022 0721   UB Dressing Goal 2    Activity/Assistive Device upper body dressing at 01/11/2022 0721   [place] modified independence at 01/11/2022 0721   Time Frame long-term goal (LTG), 21 days or less at 01/11/2022 0721   LB Dressing Goal 1    Activity/Assistive Device lower body dressing at 01/11/2022 0721   [place] supervision required  [CS] at 01/11/2022 0721   Time Frame short-term goal (STG), 5 - 7 days at 01/11/2022 0721   LB Dressing Goal 2    Activity/Assistive Device lower body dressing at 01/11/2022 0721   [place] modified independence at 01/11/2022 0721   Time Frame long-term goal (LTG), 21 days or less at 01/11/2022 0721   Grooming Goal 1    Activity/Assistive Device grooming skills, all at 01/11/2022 0721   [place] supervision required  [CS in stance] at 01/11/2022 0721   Time Frame short-term goal (STG), 5 - 7 days at 01/11/2022 0721   Grooming Goal 2    Activity/Assistive Device grooming skills, all at 01/11/2022 0721   [place] modified independence at 01/11/2022 0721   Time Frame long-term goal (LTG), 21 days or less at 01/11/2022 0721   Toileting Goal 1    Activity/Assistive Device toileting skills, all at 01/11/2022 0721   [place] supervision required  [CS] at 01/11/2022 0721   Time Frame short-term goal (STG), 5 - 7 days at 01/11/2022 0721   Toileting Goal 2    Activity/Assistive Device toileting skills, all at 01/11/2022 0721   [place] modified independence at 01/11/2022 0721   Time Frame long-term goal (LTG), 21 days or less at 01/11/2022 0721

## 2022-01-11 NOTE — PLAN OF CARE
Problem: Rehabilitation (IRF) Plan of Care  Goal: Plan of Care Review  Flowsheets (Taken 1/11/2022 1256)  Progress: improving  Plan of Care Reviewed With: patient  Outcome Summary: PT evaluation completed

## 2022-01-11 NOTE — PLAN OF CARE
"  Problem: Rehabilitation (IRF) Plan of Care  Goal: Plan of Care Review  Flowsheets (Taken 1/11/2022 0018)  Progress: no change  Plan of Care Reviewed With: patient  Outcome Summary: Pt was AAO*3 and pt sometimes forgot to use the call bell and setting off the bed alarm. Pt was complaining about taking out her medications away from her. Pt stated that \"you do not trust me and those medications are prescribed to me\" Educated patient about the hospital policy and her  took her medications home. Pt was also upset about the fact that she needed to call a person to take her to the bathroom. The nurse had to support the patient and holding her hand when she needed to go to the bathroom but she did not like to get touched instead of the fact that her walking was not stable and holding her hands were necessary. Pt also refused to get straight cath because her pvr was over 500 ml everytime she went to the bathroom, She denied any discomfort and she stated that \"I will pee whenever I want to pee\"     "

## 2022-01-11 NOTE — H&P
PMR H&P  Admitting Diagnosis: Brain metastases (CMS/HCC) [C79.31]  HPI     Lisa Mast is admitted to [facility] for comprehensive inpatient rehabilitation for Brain Injury with functional deficits in cognitive function; mobility; motor dysfunction; safety; self-care. Patient is receiving the following services: physical therapy; occupational therapy; medical consultative services; neuropsychologist; speech language pathology.  Ms. Mast-year-old female with history metastatic lung adenocarcinoma Dx 2/2021 status post L suboccipital craniotomy for brain met resection on 11/22/2021 who presented to [facility] on 1/4/2022 with persistent headache and worsening gait stability.  CT head postop course complicated by hypotension and MRI brain showed increase in pseudomeningocele and findings consistent with hydrocephalus compared to prior imaging.  Dr. Best neurosurgery performed right  shunt placement on 1/6/2022 (Delta 1.0).  Postop she was started on Decadron taper.  Her acute stay was complicated by hypertension which she received IV labetalol.  At time of transfer blood pressure was stable on losartan.  She was cleared to initiate heparin for DVT prophylaxis.  Chronic hyponatremia stable at time of transfer with sodium 133.  She was ultimately determined to be medically stable for transfer to [place] rehab on 1/10/2022 for an acute inpatient rehabilitation program to address deficits related to metastatic lung adenocarcinoma and hydrocephalus status post  shunt.      Admits to persistent gait instability.  Denies any headache, pain.    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)   • Metastatic adenocarcinoma (CMS/HCC)   • Malignant neoplasm metastatic to bone (CMS/HCC)   • Lung cancer (CMS/HCC)   • Encounter for antineoplastic chemotherapy   • Brain mass   • Brain  metastases (CMS/HCC)   • Risk for falls   • At high risk for deep venous thrombosis   • At high risk for pressure injury of skin   • Pain   • Hydrocephalus (CMS/HCC)   • Chronic hyponatremia       Medical History:   Past Medical History:   Diagnosis Date   • Hypertension    • Lung cancer (CMS/HCC)    • Lung mass        Surgical History:   Past Surgical History:   Procedure Laterality Date   • APPENDECTOMY     • BRAIN SURGERY      cerebellar mass resection   • CATARACT EXTRACTION, BILATERAL         Social History:   Social History     Social History Narrative   • Not on file     Prior Living Arrangements  People in Home: spouse  Name(s) of People in Home: Madhu  Current Living Arrangements: home  Living Environment Comment: 2STH. Pt resides on 1st floor  Number of Stairs, Main Entrance: 2  Location, Patient Bedroom: first (main) floor  Location, Bathroom: first (main) floor  Bathroom Access Comment: Walk in shower with small threshold. Has shower chair and grab bars. Grab bars by toilet  Number of Stairs, Within Home, Primary: 12 (Patients B&B on 1st floor)    Premorbid Function  Dominant Hand: right  Ambulation: assistive person ( assists)  Transferring: assistive person ( assists; transport chair to bathroom at night)  Toileting: independent  Bathing: independent  Dressing: independent ( setup clothing)  Eating: independent  Communication: understands/communicates without difficulty  Swallowing: swallows foods/liquids without difficulty  Baseline Diet/Method of Nutritional Intake: no diet restrictions  Past History of Dysphagia: No  Assistive Device/Animal Currently Used at Home: grab bar; shower chair; other (see comments) (transport chair)  Prior Level of Function Comment: CGA/Taye from spouse      Family History: No family history on file.  History also provided by:     Allergies: Codeine       Medication List      ASK your doctor about these medications    dilTIAZem  mg 24 hr  capsule  Commonly known as: CARDIZEM CD  take 1 capsule by mouth once daily for blood pressure     losartan 25 mg tablet  Commonly known as: COZAAR  Take 25 mg by mouth daily.  Dose: 25 mg          Review of Systems  All other systems reviewed and negative except as noted in the HPI.    Objective     Vital Signs for the last 24 hours:  Temp:  [36.2 °C (97.2 °F)-36.6 °C (97.8 °F)] 36.6 °C (97.8 °F)  Heart Rate:  [54-82] 66  Resp:  [16-18] 18  BP: (108-176)/(70-91) 133/70    Physical Exam  Physical Exam  Constitutional:       Appearance: Normal appearance. She is well-developed.   HENT:      Head: Normocephalic and atraumatic.     Eyes:      Extraocular Movements:      Right eye: Abnormal extraocular motion present.      Left eye: Abnormal extraocular motion present.      Conjunctiva/sclera: Conjunctivae normal.      Pupils: Pupils are equal, round, and reactive to light.   Cardiovascular:      Rate and Rhythm: Normal rate and regular rhythm.   Pulmonary:      Effort: Pulmonary effort is normal.      Breath sounds: Normal breath sounds.   Abdominal:      General: Bowel sounds are normal.      Palpations: Abdomen is soft.       Genitourinary:     Comments: No foely catheter  Musculoskeletal:         General: Normal range of motion.      Comments: No joint erythema, increased warmth or effusion   Skin:     General: Skin is warm and dry.   Neurological:      Mental Status: She is alert and oriented to person, place, and time.      Cranial Nerves: No cranial nerve deficit.      Sensory: No sensory deficit.      Motor: No tremor, abnormal muscle tone or seizure activity.      Coordination: Coordination abnormal.      Gait: Gait abnormal.      Comments: Fluent, naming and repetition intact, follows commands, facial muscle symmetric, tongue midline on protrusion, sensation to light touch intact, testing reveals 4/5 strength throughout left side, 5/5 strength right side, coordination L side noted on ROSS, HTS, no fix or drift,  tone 0/4, no clonus.   Psychiatric:         Mood and Affect: Mood is depressed.         Speech: Speech normal.         Behavior: Behavior normal.           Current Function  Mobility  Gait  [place], Gait: minimum assist (75% or more patient effort); 2 person assist (Taye 1-2)  Assistive Device: none  Comment (Gait/Stairs): unsteady, HHA    Stairs  [place], Stairs: not tested    Wheelchair     Transfers  [place], Roll Left: modified independence  [place], Roll Right: modified independence  [place], Supine to Sit: minimum assist (75% or more patient effort)  [place], Sit to Supine: modified independence  Comment (Bed Mobility): Impulsive. Steady A c bedrails  [place], Bed to Chair: minimum assist (75% or more patient effort)  Verbal Cues: safety  Assistive Device: wheelchair  Comment: SPT  [place], Chair to Bed: minimum assist (75% or more patient effort)  Verbal Cues: safety  Assistive Device: wheelchair  Comment: SPT. + impulsivity  [place], Sit to Stand Transfer: minimum assist (75% or more patient effort)  Verbal Cues: safety  Assistive Device: wheelchair  Comment: in ADL context  [place], Stand to Sit Transfer: minimum assist (75% or more patient effort)  Verbal Cues: safety  Assistive Device: wheelchair  Comment: in ADL context    [place], Toilet Transfer: minimum assist (75% or more patient effort)  Assistive Device: grab bars/safety frame  Comment: Per nursing report  Transfer Technique: stand pivot  [place], Shower Transfer: minimum assist (75% or more patient effort)  Verbal Cues: safety  Assistive Device: grab bars/tub rail; shower chair; wheelchair  Comment: Already using DME prior to eval. Min A SPT    Self Care  Self-Performance: threads left arm, shirt; threads right arm, shirt; pulls shirt over head/around back; pulls shirt down/adjusts  [place] Assistance: obtains clothes  Adaptive Equipment: none  Comment: in w/c  Tasks: pants/bottoms; socks;  "underwear  Self-Performance: threads left leg, underpants; threads right leg, underpants; pulls underpants up or down; threads left leg, pants/shorts; threads right leg, pants/shorts; pulls pants/shorts up or down; dons/doffs left sock; dons/doffs right sock  [place] Assistance: obtains clothes; pulls underpants up or down  Adaptive Equipment: none  [place]: minimum assist (75% or more patient effort)  Comment: A for pulling up underwear. Cues for safety to sit down prior to threading BLEs  Self-Performance: chest; left arm; right arm; abdomen; front perineal area; buttocks; left upper leg; right upper leg; left lower leg, including foot; right lower leg, including foot  Adaptive Equipment: grab bar/tub rail; hand-held shower spray hose; shower chair  Setup Assistance: adaptive equipment setup; adjust water temperature/flow; obtain supplies  Comment: Min A for balance while washing of buttocks. Pt refusing baby shampoo for haircare despite incision and education on use. Pt stating \"I've been using regular shampoo for 2 weeks, I don't need that stuff\"  [place]: minimum assist (75% or more patient effort)  Adaptive Equipment: grab bar/safety frame  Setup Assistance: obtain supplies  Comment: Per nursing report  Self-Performance: washes, rinses and dries face; washes, rinses and dries hands; brushes/wolf hair; oral care (brushing teeth, cleaning dentures)  Adaptive Equipment: none  Setup Assistance: obtain supplies  [place]: close supervision  Comment: Seated level    Cognition  Affect/Mental Status (Cognition): flat/blunted affect  Orientation Status (Cognition): oriented x 3; oriented x 4 (sometimes forgetfull)  Follows Commands (Cognition): WFL  Cognitive Function: safety deficit  Safety Deficit (Cognition): insight into deficits/self-awareness  Comment, Cognition: BIMS 15/15. Intermittent confusion/STM noted during evaluation. Impulsive with mobility tasks. Decrease safety " awareness    Communication  Speech Intelligibility (Motor Speech): WFL; phrase/sentence level  Follows Commands (Auditory Comprehension): WNL; 1-step command  Yes/No Questions (Auditory Comprehension): WNL; biographical/personal questions; simple/factual questions      Labs  I have reviewed the patient's labs.  Significant abnormals are hyponatremia, but stable at 133.    Imaging  Not applicable        Assessment/Plan     Risk for falls  Wean off restraints as able    At high risk for deep venous thrombosis  SC heparin    At high risk for pressure injury of skin  Turns q2hr    Pain  Tylenol, oxycodone prn    Hydrocephalus (CMS/HCC)  S/p R VPS [place] 1.0 (placed on 1/6/2022)    Lung cancer (CMS/HCC)  Holding home Keytruda, can restart in 2 weeks  -Medical Oncologist: Dr. Wang Batista ([facility])  -Radiation Oncologist: Dr. Mckinney    Chronic hyponatremia  Monitor bmp    Brain metastases (CMS/HCC)  Ms. Mast-year-old female with history metastatic lung adenocarcinoma Dx 2/2021 status post L suboccipital craniotomy for brain met resection on 11/22/2021 who presented to [facility] on 1/4/2022 with persistent headache and worsening gait stability.  CT head postop course complicated by hypotension and MRI brain showed increase in pseudomeningocele and findings consistent with hydrocephalus compared to prior imaging.  Dr. Best neurosurgery performed right  shunt placement on 1/6/2022 (Delta 1.0).  Postop she was started on Decadron taper.  Her acute stay was complicated by hypertension which she received IV labetalol.  At time of transfer blood pressure was stable on losartan.  She was cleared to initiate heparin for DVT prophylaxis.  Chronic hyponatremia stable at time of transfer with sodium 133.  She was ultimately determined to be medically stable for transfer to [place] rehab on 1/10/2022 for an acute inpatient rehabilitation program to address deficits related to metastatic lung adenocarcinoma and hydrocephalus status  post  shunt.    She is weightbearing as tolerated.  She will participate in 3 hours of physical therapy, Occupational Therapy, and speech therapy as well as evaluated by subsequent 4-hour rehab nursing care.  Throughout the follow-up EGD and fourth weeks prior to her appointment with Dr. Best from neurosurgery.        Risk for Complications  Constipation: Moderate  DVT: Moderate  Falls: Moderate  Infection: Moderate      Expected Level of Function  Expected Functional Improvement: cognitive function; mobility; motor dysfunction; safety; self-care  Self-Care: Setup or clean-up assistance  Sphincter Control: Independent  Transfers: Setup or clean-up assistance  Locomotion: Setup or clean-up assistance  Communication: Independent  Social Cognition: Independent      Anticipated Discharge Plan  Anticipated Discharge Disposition: home with assistance; home with home health  Type of Home Care Services: home PT; home OT; nursing      Plan of care was discussed with patient and nursing and   I have reviewed the pre-admission screening and there are no relevant changes.  Expected length of stay: 14 days        Code Status: Full Code    Post Admission Physician Evaluation    Lisa Mast is admitted to [facility] for comprehensive inpatient rehabilitation for Brain Injury with functional deficits in cognitive function; mobility; motor dysfunction; safety; self-care. Patient is receiving the following services: physical therapy; occupational therapy; medical consultative services; neuropsychologist; speech language pathology.    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)   • Metastatic adenocarcinoma (CMS/HCC)   • Malignant neoplasm metastatic to bone (CMS/HCC)   • Lung cancer (CMS/HCC)   • Encounter for antineoplastic chemotherapy   • Brain mass   • Brain metastases (CMS/HCC)    • Risk for falls   • At high risk for deep venous thrombosis   • At high risk for pressure injury of skin   • Pain   • Hydrocephalus (CMS/HCC)   • Chronic hyponatremia       Premorbid Function  Dominant Hand: right  Ambulation: assistive person ( assists)  Transferring: assistive person ( assists; transport chair to bathroom at night)  Toileting: independent  Bathing: independent  Dressing: independent ( setup clothing)  Eating: independent  Communication: understands/communicates without difficulty  Swallowing: swallows foods/liquids without difficulty  Baseline Diet/Method of Nutritional Intake: no diet restrictions  Past History of Dysphagia: No  Assistive Device/Animal Currently Used at Home: grab bar; shower chair; other (see comments) (transport chair)  Prior Level of Function Comment: CGA/Taye from spouse. Pt lives with  in [place], enjoys taking walks and playing tennis; Pt is health  and works full time; Pt shares responsibility of managing household finances and scheduling calendars.  They own property in [place] which pt helps to manage      Current Function  Gait  [place], Gait: minimum assist (75% or more patient effort); 2 person assist (Taye 1-2)  Assistive Device: none  Comment (Gait/Stairs): unsteady, HHA    Stairs  [place], Stairs: not tested    Wheelchair     Transfers  [place], Roll Left: modified independence  [place], Roll Right: modified independence  [place], Supine to Sit: minimum assist (75% or more patient effort)  [place], Sit to Supine: modified independence  Comment (Bed Mobility): Impulsive. Steady A c bedrails  [place], Bed to Chair: minimum assist (75% or more patient effort)  Verbal Cues: safety  Assistive Device: wheelchair  Comment: SPT  [place], Chair to Bed: minimum assist (75% or more patient effort)  Verbal Cues: safety  Assistive Device: wheelchair  Comment: SPT. + impulsivity  [place],  Sit to Stand Transfer: minimum assist (75% or more patient effort)  Verbal Cues: safety  Assistive Device: wheelchair  Comment: in ADL context  [place], Stand to Sit Transfer: minimum assist (75% or more patient effort)  Verbal Cues: safety  Assistive Device: wheelchair  Comment: in ADL context    [place], Toilet Transfer: minimum assist (75% or more patient effort)  Assistive Device: grab bars/safety frame  Comment: Per nursing report  Transfer Technique: stand pivot  [place], Shower Transfer: minimum assist (75% or more patient effort)  Verbal Cues: safety  Assistive Device: grab bars/tub rail; shower chair; wheelchair  Comment: Already using DME prior to eval. Min A SPT    Self Care  Self-Performance: threads left arm, shirt; threads right arm, shirt; pulls shirt over head/around back; pulls shirt down/adjusts  [place] Assistance: obtains clothes  Adaptive Equipment: none  Comment: in w/c  Tasks: pants/bottoms; socks; underwear  Self-Performance: threads left leg, underpants; threads right leg, underpants; pulls underpants up or down; threads left leg, pants/shorts; threads right leg, pants/shorts; pulls pants/shorts up or down; dons/doffs left sock; dons/doffs right sock  [place] Assistance: obtains clothes; pulls underpants up or down  Adaptive Equipment: none  [place]: minimum assist (75% or more patient effort)  Comment: A for pulling up underwear. Cues for safety to sit down prior to threading BLEs  Self-Performance: chest; left arm; right arm; abdomen; front perineal area; buttocks; left upper leg; right upper leg; left lower leg, including foot; right lower leg, including foot  Adaptive Equipment: grab bar/tub rail; hand-held shower spray hose; shower chair  Setup Assistance: adaptive equipment setup; adjust water temperature/flow; obtain supplies  Comment: Min A for balance while washing of buttocks. Pt refusing baby shampoo for haircare despite incision and education on use. Pt stating  "\"I've been using regular shampoo for 2 weeks, I don't need that stuff\"  [place]: minimum assist (75% or more patient effort)  Adaptive Equipment: grab bar/safety frame  Setup Assistance: obtain supplies  Comment: Per nursing report  Self-Performance: washes, rinses and dries face; washes, rinses and dries hands; brushes/wolf hair; oral care (brushing teeth, cleaning dentures)  Adaptive Equipment: none  Setup Assistance: obtain supplies  [place]: close supervision  Comment: Seated level    Cognition  Affect/Mental Status (Cognition): flat/blunted affect  Orientation Status (Cognition): oriented x 3; oriented x 4 (sometimes forgetfull)  Follows Commands (Cognition): WFL  Cognitive Function: safety deficit  Safety Deficit (Cognition): insight into deficits/self-awareness  Comment, Cognition: BIMS 15/15. Intermittent confusion/STM noted during evaluation. Impulsive with mobility tasks. Decrease safety awareness  City: 3-->spontaneous/free recall  Kind of Place: 3-->spontaneous/free recall  Name of Hospital: 3-->spontaneous/free recall  Month: 3-->spontaneous/free recall  Date: 3-->spontaneous/free recall  Year: 3-->spontaneous/free recall  Day of Week: 3-->spontaneous/free recall  Clock Time: 3-->spontaneous/free recall  Etiology/Event: 3-->spontaneous/free recall  Pathology Deficits: 3-->spontaneous/free recall  Total Score: 30    Communication  Speech Intelligibility (Motor Speech): WFL; phrase/sentence level  Articulation (Motor Speech): imprecise articulation  Speech Fluency (Motor Speech): WNL  Vocal Loudness (Motor Speech): WFL (pt reports volume is reduced compared with baseline)  Breath Support (Motor Speech): intact  Resonance (Motor Speech): WFL  Follows Commands (Auditory Comprehension): WNL; 1-step command  Yes/No Questions (Auditory Comprehension): WNL; biographical/personal questions; simple/factual questions    Swallow  Diet Consistency Recommendations: thin liquids; regular      Risk for " Complications  Constipation: Moderate  DVT: Moderate  Falls: Moderate  Infection: Moderate      Expected Level of Function  Expected Functional Improvement: cognitive function; mobility; motor dysfunction; safety; self-care  Self-Care: Setup or clean-up assistance  Sphincter Control: Independent  Transfers: Setup or clean-up assistance  Locomotion: Setup or clean-up assistance  Communication: Independent  Social Cognition: Independent      Anticipated Discharge Plan  Anticipated Discharge Disposition: home with assistance; home with home health  Type of Home Care Services: home PT; home OT; nursing      I have reviewed the pre-admission screening and there are no relevant changes.    Expected length of stay: 14 days

## 2022-01-11 NOTE — CONSULTS
Nutrition Note  222/222W    Clinical Course: Patient is a 66 y.o. female who was admitted on 1/10/2022 with a diagnosis of Brain metastases (CMS/HCC) [C79.31].     Nutrition Interventions/ Recommendations:   1. Continue current diet and fluid restriction (adjust per labs)  2. Will send Lowe Breeze with Breakfast, Magic Cup and Boost pudding with lunch and dinner - follow up to check acceptance  3. Provided Regular alternative foods list  4. Encouraged nutrition for healing  5. Will monitor po intake, weight, labs and acceptance of supplements  At nutrition risk level 2    Past Medical History:   Diagnosis Date   • Hypertension    • Lung cancer (CMS/HCC)    • Lung mass      Past Surgical History:   Procedure Laterality Date   • APPENDECTOMY     • BRAIN SURGERY      cerebellar mass resection   • CATARACT EXTRACTION, BILATERAL              Dietary Orders   (From admission, onward)             Start     Ordered    01/11/22 1017  Adult Diet Regular; 1500 mL Fluid; Thin Liquids  Diet effective now        Comments: Hyponatremia: Provide Gatorade fluid for oral hydration in stead of free water   References:    IDDSI Diet reference   Question Answer Comment   Diet Texture Regular    Fluid restriction dietary / 24h: 1500 mL Fluid    Fluid Consistency: Thin Liquids        01/11/22 1017                Reason for Assessment  Reason For Assessment: identified at risk by screening criteria (MST =5)  Diagnosis: cancer diagnosis/related complications  Identified At Risk by Screening Criteria: unintentional loss of 10 lbs or more in the past 2 mos,reduced oral intake over the last month    MST Nutrition Screen Tool  Has patient lost weight without trying?: 0-->Yes  If yes,how much weight has been lost?: 4-->34 or more pounds  Has patient been eating poorly due to decreased appetite?: 1-->Yes  MST Nutrition Screen Score: 5    Nutrition/Diet History  Typical Food/Fluid Intake: Regular diet  Appetite Prior to Admission:  "Fair-25-50%  Intake (%): 50%  Food Preferences: Dislikes lima beans, Ensure and Boost  Cultural/Anglican Preferences: None  Food Allergies: no known food allergies  Factors Affecting Nutritional Intake: anorexia    Physical Findings  Overall Physical Appearance:  (thin, no wasting noted)  Skin: surgical incision (R head, umbilical and R lower head)    [id number] Physical Appearance  Overall Physical Appearance:  (thin, no wasting noted)  Skin: surgical incision (R head, umbilical and R lower head)    Nutrition Order  Nutrition Order: meets nutritional requirements  Nutrition Order Comments: Regular/thins with 1500ml fluid restriction    Anthropometrics  Height: 175.3 cm (5' 9\")    Wt Readings from Last 3 Encounters:   01/10/22 55.2 kg (121 lb 11.2 oz)   01/10/22 56.4 kg (124 lb 4.8 oz)   02/05/21 66.2 kg (146 lb)       Weights (last 7 days)     Date/Time Weight    01/10/22 1725 55.2 kg (121 lb 11.2 oz)        Current Weight  Weight Method: Bed scale  Weight: 55.2 kg (121 lb 11.2 oz)    Ideal Body Weight (IBW)  Ideal Body Weight (IBW) (kg): 66.43  % Ideal Body Weight: 83.09    Usual Body Weight (UBW)  Usual Body Weight: 72.6 kg (160 lb)  % of Usual Body Weight Assessment: 75-84% - moderate deficit  Weight Loss: unintentional  Time Frame: 6 Months (38# (24%))    Body Mass Index (BMI)  BMI (Calculated): 18  BMI Assessment: BMI Less then 18.5: Underweight    Labs/Procedures/Meds  Lab Results Reviewed: reviewed, pertinent  Lab Results Comments: 1/11 Na 133L, glucose 117H    CMP Results       01/11/22 01/10/22 02/09/21     0612  1509     133 138    K 4.2 4.6 4.1    Cl 94 95 102    CO2 29 30 26    Glucose 117 121 117    BUN 15 18 7    Creatinine 0.4 0.49 0.7    Calcium 9.5 -- 9.6    Anion Gap 10 -- 10    AST 14 -- 48    ALT 15 -- 25    Albumin 3.4 -- 3.3    EGFR >60.0 -- >60.0        Lab Results   Component Value Date    ALT 15 01/11/2022    AST 14 (L) 01/11/2022    ALKPHOS 56 01/11/2022    BILITOT 0.5 01/11/2022 "     Lab Results   Component Value Date    KLSCRQLF30 407 02/09/2021     Lab Results   Component Value Date    CALCIUM 9.5 01/11/2022     Lab Results   Component Value Date    WBC 7.86 01/11/2022    HGB 12.5 01/11/2022    HCT 37.4 01/11/2022    MCV 87.8 01/11/2022     01/11/2022     Lab Results   Component Value Date    IRON 23 (L) 02/09/2021    TIBC 252 (L) 02/09/2021    FERRITIN 364 (H) 02/09/2021     No results found for: CHOL  No results found for: HDL  No results found for: LDLCALC  No results found for: TRIG  No results found for: CHOLHDL  Glucose Results       02/09/21     1509    HBG A1C 4.8    EST AVG GLUCOSE 91         Comment for EST AVG GLUCOSE at 1509 on 02/09/21: Estimate of average glucose concentration continuously over 24 hours for previous 2 to 3 months(Per ADA Recommendation).        • chlorhexidine  15 mL Swish & Spit BID   • dexAMETHasone  4 mg oral QID    Followed by   • [START ON 1/12/2022] dexAMETHasone  2 mg oral QID    Followed by   • [START ON 1/15/2022] dexAMETHasone  2 mg oral BID    Followed by   • [START ON 1/18/2022] dexAMETHasone  2 mg oral Daily   • docusate sodium  100 mg oral BID   • heparin (porcine)  5,000 Units subcutaneous q8h BECKA   • insulin aspart U-100  1-10 Units subcutaneous TID with meals   • [START ON 1/12/2022] losartan  50 mg oral Daily with dinner   • pantoprazole  40 mg oral Daily   • senna  2 tablet oral Daily     Medications  Pertinent Medications Reviewed: reviewed, pertinent  Pertinent Medications Comments: decadron, peridex, colace, senokot    Estimated/Assessed Needs  Additional Documentation: Calorie Requirements (Group),Fluid Requirements (Group),Protein Requirements (Group)    Calorie Requirements  Estimated kCal Needs: Actual Body Weight,Other (comment)  Estimated Calorie Need Method: kcal/kg  Calorie/kg Recommended: 30-35  Calorie Recommendations: 1764-4958    Protein Requirements  Recommended Dosing Weight (Estimated Protein Needs): Actual Body  "Weight  Est Protein Requirement Amount (gms/kg):  (1.2-1.5g/kg)  Protein Recommendations: 66-83    Fluid Requirements  Fluid Recommendation (mL): 25-30ml  Recommended Fluid Needs Dosing Weight: Actual Body Weight  Fluid Requirements (mL/day): 4260-4570  Estimated Fluid Requirement Method:  (ml/kg)  [place] Method (over 20 kg): 2604.06    PES  Statement: PES Statement  Nutrition Diagnosis: Unintended Weight Loss  Related To:: Decreased ability to consume sufficient energy  As Evidenced By:: 38# (24%) weight loss x 6 months  Nutritional Needs Met?: No                                 Clinical Comments:     Patient states she's eating \"fine\", but she appears thin and is only eating 25-50% of meals per flowsheets.  She admits to losing ~38# x 6 months which is significant.  She states she dislikes Ensure and Boost, will trial Boost pudding, Breeze and Magic Cups.  Patient has increased needs due to hypermetabolic state (metastatic CA).  Patient on a fluid restriction due to chronic hyponatremia (Gatorade ordered at all meals by MD).  Patient on steroids - glucose level is mildly elevated.  No need for NCS diet at this time.    Goals:  1. Adequate po intake to meet > 75% of nutritional needs  2. Replete weight to 130#  3.  Lytes/Labs WNL    Discussed with: Patient    Date: 01/11/22  Signature: Rosa Isela Raya RD  "

## 2022-01-11 NOTE — PROGRESS NOTES
Inpatient Psychology Initial Intake    Duration:  25 minutes    Lisa Mast, : 1955, a 66 y.o. female, for initial evaluation visit to discuss Adjustment to Disability.    HPI: Lisa Mast is admitted to [facility] for comprehensive inpatient rehabilitation for Brain Injury with functional deficits in cognitive function; mobility; motor dysfunction; safety; self-care. Patient is receiving the following services: physical therapy; occupational therapy; medical consultative services; neuropsychologist; speech language pathology.  Ms. Mast-year-old female with history metastatic lung adenocarcinoma Dx 2021 status post L suboccipital craniotomy for brain met resection on 2021 who presented to hospitals on 2022 with persistent headache and worsening gait stability.  CT head postop course complicated by hypotension and MRI brain showed increase in pseudomeningocele and findings consistent with hydrocephalus compared to prior imaging.  Dr. Best neurosurgery performed right  shunt placement on 2022 (Delta 1.0).  Postop she was started on Decadron taper.  Her acute stay was complicated by hypertension which she received IV labetalol.  At time of transfer blood pressure was stable on losartan.  She was cleared to initiate heparin for DVT prophylaxis.  Chronic hyponatremia stable at time of transfer with sodium 133.  She was ultimately determined to be medically stable for transfer to [facility] on 1/10/2022 for an acute inpatient rehabilitation program to address deficits related to metastatic lung adenocarcinoma and hydrocephalus status post  shunt.        Admits to persistent gait instability.  Denies any headache, pain.          Past Medical History:   Diagnosis Date   • Hypertension    • Lung cancer (CMS/HCC)    • Lung mass      Past Surgical History:   Procedure Laterality Date   • APPENDECTOMY     • BRAIN SURGERY      cerebellar mass resection   • CATARACT  EXTRACTION, BILATERAL       No family history on file.  Social History     Socioeconomic History   • Marital status:      Spouse name: None   • Number of children: None   • Years of education: None   • Highest education level: None   Occupational History   • Occupation: sells health insurance   Tobacco Use   • Smoking status: Former Smoker   • Smokeless tobacco: Never Used   Substance and Sexual Activity   • Alcohol use: Yes   • Drug use: None   • Sexual activity: None   Other Topics Concern   • None   Social History Narrative    Ms. Mast currently lives with her .        Hobbies: tennis, walking     Social Determinants of Health     Financial Resource Strain: Not on file   Food Insecurity: No Food Insecurity   • Worried About Running Out of Food in the Last Year: Never true   • Ran Out of Food in the Last Year: Never true   Transportation Needs: Not on file   Physical Activity: Not on file   Stress: Not on file   Social Connections: Not on file   Intimate Partner Violence: Not on file   Housing Stability: Not on file       Current Legal Status:       Number of Arrests:      Previous Mental Health History:   Previous Mental Health Treatment:  N/A  Previous Suicidal Behavior:  N/A  Previous Self-Injurious Behavior:  N/A  Previous Homicidal Behavior:  N/A  Previous Substance Abuse Treatment: N/A    Current Facility-Administered Medications   Medication Dose Route Frequency Provider Last Rate Last Admin   • acetaminophen (TYLENOL) tablet 650 mg  650 mg oral q4h PRN Cezar Alamo DO   650 mg at 01/11/22 1120   • calcium carbonate (TUMS) chewable tablet 500 mg  500 mg oral 3x daily PRN Jorge Orellana MD       • chlorhexidine (PERIDEX) 0.12 % mouthwash 15 mL  15 mL Swish & Spit BID Cezar Alamo DO   15 mL at 01/11/22 0741   • dexAMETHasone (DECADRON) tablet 4 mg  4 mg oral QID Cezar Alamo DO   4 mg at 01/11/22 1120    Followed by   • [START ON 1/12/2022] dexAMETHasone (DECADRON) tablet  2 mg  2 mg oral QID Cezar Alamo DO        Followed by   • [START ON 1/15/2022] dexAMETHasone (DECADRON) tablet 2 mg  2 mg oral BID Cezar Alamo DO        Followed by   • [START ON 1/18/2022] dexAMETHasone (DECADRON) tablet 2 mg  2 mg oral Daily Cezar Alamo DO       • docusate sodium (COLACE) capsule 100 mg  100 mg oral BID Cezar Alamo DO   100 mg at 01/11/22 0741   • heparin (porcine) 5,000 unit/mL injection 5,000 Units  5,000 Units subcutaneous q8h BECKA Cezar Alamo DO   5,000 Units at 01/11/22 1241   • insulin aspart U-100 (NovoLOG) pen 1-10 Units  1-10 Units subcutaneous TID with meals Jorge Orellana MD       • [START ON 1/12/2022] losartan (COZAAR) tablet 50 mg  50 mg oral Daily with dinner Jorge Orellana MD       • ondansetron ODT (ZOFRAN-ODT) disintegrating tablet 4 mg  4 mg oral q8h PRN Cezar Alamo DO       • oxyCODONE (ROXICODONE) immediate release tablet 10 mg  10 mg oral q6h PRN Cezar Alamo DO   10 mg at 01/11/22 1239   • oxyCODONE (ROXICODONE) immediate release tablet 5 mg  5 mg oral q6h PRN Cezar Alamo DO   5 mg at 01/11/22 0621   • pantoprazole (PROTONIX) tablet,delayed release (DR/EC) 40 mg  40 mg oral Daily Cezar Alamo DO   40 mg at 01/11/22 0741   • senna (SENOKOT) tablet 2 tablet  2 tablet oral Daily Cezar Alamo DO   2 tablet at 01/11/22 0741   • sodium chloride (OCEAN) 0.65 % nasal spray 2 spray  2 spray Each Nostril 2x daily PRN Cezar Alamo DO           Current Evaluation:   Mental Status Exam:  Arousal Level: Alert  Appearance: Well Groomed  Speech: Regular  Psychomotor Functioning: WNL  Eye Contact: WNL  Est. Premorbid Intelligence: Average  Orientation: Fully oriented  Concentration: WNL  Recent Memory: WNL  Thought Content: Guarded  Insight: Intact  Perceptual Function: Visual impairment  Appetite: No Change (increased time for swallow)  Affect: Tense  Mood: Frustrated    Assessments done this visit:     [facility]  "Severity Rating Scale:  Done today      [place] Suicide Severity Rating Scale  1. Within the past month, have you wished you were dead or wished you could go to sleep and not wake up?: No  2. Within the past month, have you actually had any thoughts of killing yourself?: No  6. Have you ever done anything, started to do anything, or prepared to do anything to end your life?: No    Safe-T Assessment:  Not indicated        Comments:     Risk Assessment:   Suicidal Ideation: Based on [place] Suicide Screen and current clinical assessment, patient is determined Low Risk.  Self Injurious Behavior:  Not Present  Irritability:  Present  Homicidal Behavior: Not Present  Estimate of Risk:  None  If risk identified have suicide precautions been implemented? No    Goals Addressed                 This Visit's Progress    • Increase adjustment to rehabilitation facility.             Interventions  Monitoring of Symptoms    Recommendations:      Individual Therapy  30 minutesweekly      Visit Diagnosis:     1. Adjustment disorder with depressed mood        Diagnostic Impression:   Weekly Outcome Statement: Ms. Mast is a 66 year old female admitted for rehab following deficits related to metastatic lung adenocarcinoma and hydrocephalus status post  shunt.  Patient was oriented to person, place, time/date and situation.  Demeanor is cooperative but guarded.  She reported feeling displeased with the hospital, feeling the focus should be more on \"my head\" and less on other areas.  She indicated feeling embarrassed by use of a gait belt when walking and supervision when toileting.  She expressed understanding that these are standard safety procedures at the rehab, but did not feel they were appropriate for her case.  She expresses hope for discharge soon, with outpatient follow up.  Concentration showed No impairment; as evidenced by recitation of the months in backward order.  Memory showed No impairment; as evidenced by free " recall of 3/3 items after a brief delay.  Insight to deficits appears to be intact; she is aware of difficulty with balance and vision on the left side.  She notes prior difficulty with swallowing.  Impulse control was mildly impaired in this session.  There is no history of prior mental health diagnosis or treatment.  Current mood is frustrated.  affect: tense, but she did smile appropriately.  Primary patient goals for this course of rehab is to improve her balance and vision.  The patient receives support from her  and siblings. Psychology will continue to follow for the duration of the inpatient rehabilitation stay.      APOLLO Medina.D

## 2022-01-11 NOTE — PLAN OF CARE
Problem: Rehabilitation (IR[facility]) Plan of Care  Goal: Plan of Care Review  Flowsheets (Taken 1/11/2022 1232)  Plan of Care Reviewed With: patient  Outcome Summary: OT IE completed

## 2022-01-11 NOTE — ASSESSMENT & PLAN NOTE
Ms. Mast-year-old female with history metastatic lung adenocarcinoma Dx 2/2021 status post L suboccipital craniotomy for brain met resection on 11/22/2021 who presented to [facility] on 1/4/2022 with persistent headache and worsening gait stability.  CT head postop course complicated by hypotension and MRI brain showed increase in pseudomeningocele and findings consistent with hydrocephalus compared to prior imaging.  Dr. Best neurosurgery performed right  shunt placement on 1/6/2022 (Delta 1.0).  Postop she was started on Decadron taper.  Her acute stay was complicated by hypertension which she received IV labetalol.  At time of transfer blood pressure was stable on losartan.  She was cleared to initiate heparin for DVT prophylaxis.  Chronic hyponatremia stable at time of transfer with sodium 133.  She was ultimately determined to be medically stable for transfer to [facility] rehab on 1/10/2022 for an acute inpatient rehabilitation program to address deficits related to metastatic lung adenocarcinoma and hydrocephalus status post  shunt.    She is weightbearing as tolerated.  She will participate in 3 hours of physical therapy, Occupational Therapy, and speech therapy as well as evaluated by subsequent 4-hour rehab nursing care.  Throughout the follow-up EGD and fourth weeks prior to her appointment with Dr. Best from neurosurgery.

## 2022-01-11 NOTE — HOSPITAL COURSE
History of Present Illness  Lisa is a 66 y.o. female admitted on 1/10/2022 with Brain metastases (CMS/HCC) [C79.31]. Principal problem is Brain metastases (CMS/HCC).    66 yy.o. 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, radiation pneumonitis, esophageal stricture, and heart murmur presents for POV s/p SOC for resection of metastasis on 11/22/2021 presents with persistent headaches and worsening gait instability found to have hydrocephalus, pseudomeningocele, and interval increase of bleeding into resection cavity and is now s/p RF VPS, Delta 1.0 (1/6)     Neurosurgery note 1/10:   #Left cerebellar brain mass s/p SOC (11/22/2021) c/b hydrocephalus, pseudomeningocele, resection cavity hemorrhage now s/p RF VPS, Delta 1.0 (1/6)    #Post op Vision changes  -Post op (11/22): double / blurry vison, left eye ptosis, left eye nystagmus  -Seen by Ophthalmology last admission, scheduled for outpatient follow up in February    Past Medical History  Lisa has a past medical history of Hypertension, Lung cancer (CMS/HCC), and Lung mass.

## 2022-01-11 NOTE — PROGRESS NOTES
"MD Alamo messaged and MD Orellana notified in person of patients refusal of straight cath procedure. Lisa is urinating a fair amount, but bladder scanning PVR shows > 500. Education provided of importance of emptying bladder.    1030 Lisa assisted to bathroom, would not allow me to bladder scan. Told me \"I'm not in any pain, you just need to leave me alone.\" Education provided.  "

## 2022-01-11 NOTE — PROGRESS NOTES
01/11/22 1100   General Information   Preferred Language eng   Referring Facility Type acute care facility   Contact Information   Permission Granted to Share Info With family/designee;   Contact Information Comments  Madhu  and nurse Advocate Mariela    Advance Directives (for Healthcare)   Does patient have advance directive? 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 No   Team Conference   Next Team Conference Date 01/12/22

## 2022-01-11 NOTE — PROGRESS NOTES
Patient: Lisa Mast  Location: [facility]  MRN: [id number]  Today's date: 1/11/2022    History of Present Illness  Lisa is a 66 y.o. female admitted on 1/10/2022 with Brain metastases (CMS/HCC) [C79.31]. Principal problem is Brain metastases (CMS/HCC).    66 yy.o. 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, radiation pneumonitis, esophageal stricture, and heart murmur presents for POV s/p SOC for resection of metastasis on 11/22/2021 presents with persistent headaches and worsening gait instability found to have hydrocephalus, pseudomeningocele, and interval increase of bleeding into resection cavity and is now s/p RF VPS, Delta 1.0 (1/6)     Neurosurgery note 1/10:   #Left cerebellar brain mass s/p SOC (11/22/2021) c/b hydrocephalus, pseudomeningocele, resection cavity hemorrhage now s/p RF VPS, Delta 1.0 (1/6)    #Post op Vision changes  -Post op (11/22): double / blurry vison, left eye ptosis, left eye nystagmus  -Seen by Ophthalmology last admission, scheduled for outpatient follow up in February    Past Medical History  Lisa has a past medical history of Hypertension, Lung cancer (CMS/HCC), and Lung mass.      SLP Pain    Date/Time Pain Type Location Rating: Activity Interventions Who   01/11/22 0915 Pain Assessment head 8 diversional activity provided CWB   01/11/22 0955 Pain Reassessment head 8 quiet environment facilitated;relaxation techniques promoted CWB          Prior Living Environment      Most Recent Value   People in Home spouse   Current Living Arrangements home   Living Environment Comment [facility]. Pt resides on 1st floor   Number of Stairs, Main Entrance 1   Stair Railings, Main Entrance none   Stairs Comment, Main Entrance Reports  would assist PTA   Location, Patient Bedroom first (main) floor   Location, Bathroom first (main) floor   Bathroom Access Comment Walk in shower with small threshold. Has shower chair and grab bars. Grab bars by  toilet          Prior Level of Function      Most Recent Value   Dominant Hand right   Ambulation assistive person   Transferring assistive person   Toileting assistive person   Bathing independent   Dressing independent  [ setup clothing]   Prior Level of Function Comment CGA/Taye from spouse. Pt lives with  in [place], enjoys taking walks and playing tennis,  Pt is health  and works full time,  Pt shares responsibility of managing household finances and scheduling calendars.  They own property in [place] which pt helps to manage   Assistive Device Currently Used at Home grab bar, shower chair, other (see comments)  [transport chair]           IRF SLP Evaluation and Treatment - 01/11/22 0915        SLP Time Calculation    Start Time 0900     Stop Time 1000     Time Calculation (min) 60 min        Session Details    Document Type initial evaluation     Mode of Treatment speech language pathology;individual therapy        General Information    Patient Profile Reviewed yes     General Observations of Patient pt in bed with lights out to help manage headache pain     Existing Precautions/Restrictions fall     Limitations/Impairments safety/cognitive        Cognition/Psychosocial    Affect/Mental Status (Cognition) flat/blunted affect;sad/depressed affect     Behavioral Issues (Cognition) difficulty managing stress;overwhelmed easily;verbal outbursts   pt became verbally escalated at times; benefitted from breaks, reduced demands, supportive listening    Cognitive Function attention deficit;executive function deficit;memory deficit     Comment, Attention pt's fatigue and pain level interfered with attention/concentration/participation in cognitive tasks;     Executive Function Deficit (Cognition) minimal deficit;moderate deficit;abstract thinking;organization/sequencing;problem-solving/reasoning     Comment, Executive Function Pt with low frustration tolerance with emotional outbursts with  "non-preferred and more difficult tasks; stated \"Why are you setting me up to fail? when pt experiencing difficulty with eval questions. pt answered convergent reasoning questions x 100%; had more difficulty with divergent reasoning/organization tasks; pt listed 14 items in concrete categories given one minute time constraint; listed only five items in more abstract category given one minute; pt listed only one solution to verbal problem solving scenarios Carmela and 3 with mod cues; Pt does not report a change in cognitive status s/p surgery     Memory Deficit (Cognition) minimal deficit;moderate deficit     Comment, Short Term Memory min cues to recall events from earlier OT session today; recalled 2/3 unrelated words after 5 mins; working memory for list of 3 words and 6 digits forward; pt became irritated with continued memory testing and refused to continue        Orientation Log    City 3-->spontaneous/free recall     Kind of Place 3-->spontaneous/free recall     Name of Hospital 3-->spontaneous/free recall     Month 3-->spontaneous/free recall     Date 3-->spontaneous/free recall     Year 3-->spontaneous/free recall     Day of Week 3-->spontaneous/free recall     Clock Time 3-->spontaneous/free recall     Etiology/Event 3-->spontaneous/free recall     Pathology Deficits 3-->spontaneous/free recall     Total Score 30        Dentition (Oral Motor)    Dentition (Oral Motor) natural dentition        Facial Symmetry (Oral Motor)    Facial Symmetry (Oral Motor) WNL        Lip Function (Oral Motor)    Lip Range of Motion (Oral Motor) WNL     Lip Strength (Oral Motor) WFL        Tongue Function (Oral Motor)    Tongue ROM (Oral Motor) lateralization is impaired;elevation is impaired     Elevation, Tongue ROM Impairment (Oral Motor) minimal impairment     Lateralization, Tongue ROM Impairment (Oral Motor) minimal impairment     Tongue Strength (Oral Motor) minimal impairment;moderate impairment     Tongue Coordination/Speed " "(Oral Motor) reduced rate        Motor Speech    Articulation (Motor Speech) imprecise articulation     Speech Fluency (Motor Speech) WNL     Vocal Loudness (Motor Speech) WFL   pt reports volume is reduced compared with baseline    Breath Support (Motor Speech) intact     Resonance (Motor Speech) WFL     Comment, Motor Speech Assessment mild ataxic dysarthria; pt with decreased rates of rapid alteranating movements for speech resulting in imprecise articulation which mildly interferes with quality but not intelligibility of speech        Auditory Comprehension    Follows Commands (Auditory Comprehension) 2-step commands     2 Step, Follows Commands (Auditory Comprehension) intact     Yes/No Questions (Auditory Comprehension) complex questions     Complex Questions (Auditory Comprehension) intact        Verbal Expression    Confrontational Naming (Verbal Expression) objects     Objects, Confrontational Naming (Verbal Expression) intact     Responsive Naming (Verbal Expression) semantic/function     Semantic/Function, Responsive Naming (Verbal Expression) intact     Comment, Assessment (Verbal Expression) language fluent without notable word finding difficulties; effective expression for basic conversation        Reading Comprehension    Comment, Assessment (Reading Comprehension) pt c/o blurry vision in left eye; pt able to scan and locate information to answer questions related to pt menu; reports difficulty seeing to read lengthier reading material        Written Language    Comment, Assessment (Written Language) wrote name, address and three goals for Rehab stay with good spelling and legibility        General Swallowing Observations    Current Diet/Method of Nutritional Intake regular;thin liquids     Comment, General Swallowing Observations Pt reports she coughs \"frequently\" on thin liquids; reports recent hx of esophageal stricture        Food and Liquid Trials (NIS)    Patient Positioning HOB elevated " "(specify degrees)   90 degrees    Oral Intake/Feeding Performance control for amount/rate of intake needed     Liquid Consistencies Evaluated thin liquids     Comment, Oral Phase mildly incoordinated timing of transfer/swallow intiation     Pharyngeal Phase of Swallow delayed swallow reflex initiation;uncoordinated swallow;multiple swallows per bolus;reports sensation of liquid stuck in throat;throat clearing noted after the swallow     Comment, Pharyngeal Phase pt with mild throat clear following 3/6 sips of water; no coughing observed; sxs inconsistently reduced with small single sip strategy; pt refused trials of solids or continued trials of thins; pt with two swallows per sip of thins and reports sensation of liquids and pills getting \"stuck in throat\" at level of vallecula and pyriform sinuses; reports she prefers to take pills in yogurt (dislikes applesauce)        Swallowing Recommendations    Diet Consistency Recommendations regular;thin liquids     Medication Administration crushed with pureed;whole with pureed   jello or yogurt    Supervision Level for Intake patient independent     Feeding/Delivery Recommendations single cup sips only;small bites/sips     Posture Recommendations fully upright in chair/chair mode of bed     Comment, Swallowing Recommendations pt complains of frequent coughing with thins but this not observed during eval; please monitor for sxs of aspiration; Speech to follow to train in use of small, single sip strategy        Functional Communication Measures    FCM: Attention 5-->Level 5     FCM: Memory 5-->Level 5     FCM: Motor Speech 6-->Level 6     FCM: Problem Solving 5-->Level 5     FCM: Swallowing 6-->Level 6        IRF SLP Goals    Swallowing Goal Selection oral nutrition/hydration, SLP Goal 1;oral nutrition/hydration, SLP Goal 2        Patient/Family Goals (SLP)    Patient's Goals For Discharge --   reduce headaches, improve vision, improve balance and coordination       " Therapy Assessment/Plan (SLP)    SLP Diagnosis mild ataxic dysarthria; mild cogntive-linguistic deficits characterized by decreased attn, memory, reasoning/problem solving interfering with success in more complex cogntive-communication tasks     Swallowing Diagnosis mild;oropharyngeal phase dysphagia     Rehab Potential/Prognosis (SLP) good, to achieve stated therapy goals     Frequency of Treatment (SLP) 5-7 times per week     Estimated Duration of Therapy (SLP) 3 weeks     Planned Therapy Interventions cognitive therapy;dysphagia therapy;speech therapy;patient/family education        Daily Progress Summary (SLP)    Daily Outcome Statement intial eval completed; POC established; next session continue to monitor tolerance for thin liquids with use of small single sip strategy, continue cognitive-linguistic eval                 Education Documentation  Eating and Swallowing Adaptations, taught by Viktoria Beebe CCC-SLP at 1/11/2022  2:45 PM.  Learner: Patient  Readiness: Acceptance  Method: Explanation  Response: Verbalizes Understanding  Comment: educated pt re: role of Speech in rehab process and initial POC    Communication, taught by Viktoria Beebe CCC-SLP at 1/11/2022  2:45 PM.  Learner: Patient  Readiness: Acceptance  Method: Explanation  Response: Verbalizes Understanding  Comment: educated pt re: role of Speech in rehab process and initial POC    Cognitive Support Measures, taught by Viktoria Beebe CCC-SLP at 1/11/2022  2:45 PM.  Learner: Patient  Readiness: Acceptance  Method: Explanation  Response: Verbalizes Understanding  Comment: educated pt re: role of Speech in rehab process and initial POC          IRF SLP Goals      Most Recent Value   Motor Speech/Voice Goal 1    Motor Speech/Voice Goal 1 use strategies for articulatory precision in simple to mod level speech tasks x 80% mod cues at 01/11/2022 0915   Time Frame short-term goal (STG), 1 week at 01/11/2022 0915   Motor Speech/Voice  Goal 2    Motor Speech/Voice Goal 2 use strategies for articulatory precision in mod to high level speech tasks x 90% min cues at 01/11/2022 0915   Time Frame long-term goal (LTG), 3 weeks at 01/11/2022 0915   Oral Nutrition/Hydration Goal 1    Activity effective/safe/independent, oral nutrition/hydration, use of swallowing techniques  [tolerate regular and thins with use of small bites/sips strategy x 90% min cues] at 01/11/2022 0915   Time Frame short-term goal (STG), 1 week at 01/11/2022 0915   Oral Nutrition/Hydration Goal 2    Activity effective/safe/independent  [use swallow strategies x 100%Carmela] at 01/11/2022 0915   Time Frame long-term goal (LTG), 3 weeks at 01/11/2022 0915   Attention Goal 1    Activity maintain focused/sustained attention, perform selective attention tasks, perform alternating attention tasks, for 10 minutes  [mod level tasks] at 01/11/2022 0915   [place]/Cues with moderate, verbal cues/redirection at 01/11/2022 0915   Time Frame short-term goal (STG), 1 week at 01/11/2022 0915   Attention Goal 2    Activity maintain focused/sustained attention, perform selective attention tasks, perform alternating attention tasks, for 20-30 minutes at 01/11/2022 0915   [place]/Cues independently at 01/11/2022 0915   Time Frame long-term goal (LTG), 3 weeks at 01/11/2022 0915   Executive Function Goal 1    Activity abstract thinking tasks, insight/awareness of deficits, organization/sequencing tasks, planning/decision-making tasks, problem-solving/reasoning tasks  [mod level] at 01/11/2022 0915   [place]/Accuracy with 80% accuracy, with moderate, verbal cues/redirection at 01/11/2022 0915   Time Frame short-term goal (STG), 1 week at 01/11/2022 0915   Executive Function Goal 2    Activity abstract thinking tasks, insight/awareness of deficits, organization/sequencing tasks, planning/decision-making tasks, problem-solving/reasoning tasks at 01/11/2022 0915   [place]/Accuracy with 90%  accuracy, with minimum, verbal cues/redirection at 01/11/2022 0915   Time Frame long-term goal (LTG), 3 weeks at 01/11/2022 0915   Memory Goal 1    Activity short-term memory tasks, working memory tasks, prospective memory tasks, recall recent events  [mod level] at 01/11/2022 0915   [place]/Accuracy with 80% accuracy, moderate cues for use of strategies at 01/11/2022 0915   Memory Goal 2    Activity short-term memory tasks, working memory tasks, prospective memory tasks, recall recent events at 01/11/2022 0915   [place]/Accuracy with 90% accuracy, minimal cues for use of strategies at 01/11/2022 0915   Time Frame long-term goal (LTG), 3 weeks at 01/11/2022 0915

## 2022-01-11 NOTE — PLAN OF CARE
Plan of Care Review  Plan of Care Reviewed With: patient  Progress: no change  Outcome Summary: Lisa is AO. VSS on RA. Incisional head pain- controlled with PRN tylenol and oxy. Patient is calm and cooperative with all care other than bladder scanning and cathing. All needs met.

## 2022-01-11 NOTE — PROGRESS NOTES
01/11/22 1137   Food Insecurity   Within the past 12 months, you worried that your food would run out before you got the money to buy more. Never true   Within the past 12 months, the food you bought just didn’t last and you didn’t have money to get more. Never true

## 2022-01-11 NOTE — PLAN OF CARE
Problem: Oral Intake Inadequate  Goal: Optimal Oral Intake  Outcome: Progressing  Intervention: Promote and Optimize Fluid and Food Intake  Flowsheets (Taken 1/11/2022 1527)  Oral Nutrition Promotion (IRF):   nutrient dense foods provided   nutrition therapy counseling provided   supplemental drinks provided   Nutrition Interventions/ Recommendations:   1. Continue current diet and fluid restriction (adjust per labs)  2. Will send [employer] Breeze with Breakfast, Magic Cup and Boost pudding with lunch and dinner - follow up to check acceptance  3. Provided Regular alternative foods list  4. Encouraged nutrition for healing  5. Will monitor po intake, weight, labs and acceptance of supplements

## 2022-01-11 NOTE — PLAN OF CARE
"  Problem: Rehabilitation (IRF) Plan of Care  Goal: Plan of Care Review  Flowsheets (Taken 1/11/2022 1442)  Progress: improving  Plan of Care Reviewed With: patient  Outcome Summary:   mild ataxic dysarthria   mild cogntive-linguistic deficits characterized by decreased attn, memory, reasoning/problem solving interfering with success in more complex cogntive-communication tasks     Problem: Rehabilitation (IRF) Plan of Care  Goal: Patient-Specific Goal (Individualized)  Flowsheets (Taken 1/11/2022 1442)  Anxieties, Fears or Concerns: \"my speech sounds different now\"     "

## 2022-01-11 NOTE — CONSULTS
Consult Note    Reason For Referral: Medical comanagements  Referring Provider: Cezar Reynoso DO  Outside records reviewed.    CC:Metastic brain tumor form lung adenocarcinoma, s/p resection, complicated with hydrocephalus and pseudomeningocele, associated with persistent headaches and worsening gait instability, s/p an elective RF VPS, ADL and ambulatory dysfunction.     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 Her post op course was not complicated. She has urinary retention, but has been refusing cic per RN at [facility], she stated she can urinate by herself, her BM is fine, no confusion or significant agitation, her VS is fine, no new neurologic deficits. Functionally, she has ADL and ambul;atory dysfunction, requiring inpt acute rehab, transferred to [facility] on 1/10/22.       Denies nausea, vomiting, abdominal pain or discomfort, dysuria, cough/sputum, running nose, sore throat, chest pain, palpitation, SOB or orthopnea, dizziness or LH,  HA.    Tolerating Diet: regular thin liquid diet      Allergies:    Allergies   Allergen Reactions   • Codeine      Other reaction(s): Unknown  Headaches         Current medication list:  Current Facility-Administered Medications   Medication Dose Route Frequency Provider Last Rate Last Admin   • acetaminophen (TYLENOL) tablet 650 mg  650 mg oral q4h PRN Cezar Alamo DO   650 mg at 01/11/22 0621   • chlorhexidine (PERIDEX) 0.12 % mouthwash 15 mL  15 mL Swish & Spit BID Cezar Alamo DO   15 mL at 01/11/22 0741   • dexAMETHasone  (DECADRON) tablet 4 mg  4 mg oral QID Cezar Alamo DO   4 mg at 01/11/22 0741    Followed by   • [START ON 1/12/2022] dexAMETHasone (DECADRON) tablet 2 mg  2 mg oral QID Cezar Alamo DO        Followed by   • [START ON 1/15/2022] dexAMETHasone (DECADRON) tablet 2 mg  2 mg oral BID Cezar Alamo DO        Followed by   • [START ON 1/18/2022] dexAMETHasone (DECADRON) tablet 2 mg  2 mg oral Daily Cezar Alamo DO       • glucose chewable tablet 16-32 g of dextrose  16-32 g of dextrose oral PRN Cezar Alamo DO        Or   • dextrose 40 % oral gel 15-30 g of dextrose  15-30 g of dextrose oral PRN Cezar Alamo DO        Or   • glucagon (GLUCAGEN) injection 1 mg  1 mg intramuscular PRN Cezar Alamo DO        Or   • dextrose in water injection 12.5 g  25 mL intravenous PRN Cezar Alamo DO       • docusate sodium (COLACE) capsule 100 mg  100 mg oral BID Cezar Alamo DO   100 mg at 01/11/22 0741   • heparin (porcine) 5,000 unit/mL injection 5,000 Units  5,000 Units subcutaneous q8h [facility] Cezar Alamo DO   5,000 Units at 01/11/22 0606   • losartan (COZAAR) tablet 25 mg  25 mg oral Daily Cezar Alamo DO   25 mg at 01/11/22 0741   • ondansetron ODT (ZOFRAN-ODT) disintegrating tablet 4 mg  4 mg oral q8h PRN Cezar Alamo DO       • oxyCODONE (ROXICODONE) immediate release tablet 10 mg  10 mg oral q6h PRN Cezar Alamo DO       • oxyCODONE (ROXICODONE) immediate release tablet 5 mg  5 mg oral q6h PRN Cezar Alamo DO   5 mg at 01/11/22 0621   • pantoprazole (PROTONIX) tablet,delayed release (DR/EC) 40 mg  40 mg oral Daily Cezar Alamo DO   40 mg at 01/11/22 0741   • senna (SENOKOT) tablet 2 tablet  2 tablet oral Daily Cezar Alamo,    2 tablet at 01/11/22 0741   • sodium chloride (OCEAN) 0.65 % nasal spray 2 spray  2 spray Each Nostril 2x daily PRN Cezar Alamo,            Past Medical History:   Past Medical History:   Diagnosis Date  "  • Hypertension    • Lung cancer (CMS/HCC)    • Lung mass        Past Surgical History:   Past Surgical History:   Procedure Laterality Date   • APPENDECTOMY     • BRAIN SURGERY      cerebellar mass resection   • CATARACT EXTRACTION, BILATERAL         Social History:   Social History     Socioeconomic History   • Marital status:      Spouse name: Not on file   • Number of children: Not on file   • Years of education: Not on file   • Highest education level: Not on file   Occupational History   • Not on file   Tobacco Use   • Smoking status: Former Smoker   • Smokeless tobacco: Never Used   Substance and Sexual Activity   • Alcohol use: Yes   • Drug use: Not on file   • Sexual activity: Not on file   Other Topics Concern   • Not on file   Social History Narrative   • Not on file     Social Determinants of Health     Financial Resource Strain: Not on file   Food Insecurity: Not on file   Transportation Needs: Not on file   Physical Activity: Not on file   Stress: Not on file   Social Connections: Not on file   Intimate Partner Violence: Not on file   Housing Stability: Not on file       Family History:   Reviewed, not contributory to her current HPI.    ROS  Complete Review of Systems:  All other systems reviewed and not remarkable except as noted in the HPI.    Vital signs:  Visit Vitals  /70 (BP Location: Left upper arm, Patient Position: Lying)   Pulse 66   Temp 36.6 °C (97.8 °F) (Oral)   Resp 18   Ht 1.753 m (5' 9\")   Wt 55.2 kg (121 lb 11.2 oz)   SpO2 97%   BMI 17.97 kg/m²       Physical Examination:  Head/Ear/Nose/Throat: scalp incisional site clean, no discharge or bleeding, focal c/d/i; moisture mouth mm, no oropharyngeal thrush noted.   Eyes: anicteric sclera, EOMI; PERRL.   Neck : supple, no JVD, no carotid bruits appeciated.   Respiratory: no evidence of labored breathing, lung sounds CTA b/l, good aeration bibasilar area, no w/r/c.   Cardiovascular: RRR; normal S1, S2; no m/r/g; no S3 or S4. "   Gastrointestinal: soft; NT; BS normal; mildly distended; no CVAT b/l.   Genitourinary: no hi.   Extremities : no c/c/e .   Neurological: AO x 3, fluent speeches, following commands, CNS II-XII grossly intact; no focal neurologic deficits.   Behavior/Emotional: in NAD, appropriate; cooperative.   Skin: clean, dry and intact.    Labs:  Lab Results   Component Value Date    WBC 7.86 01/11/2022    HGB 12.5 01/11/2022    HCT 37.4 01/11/2022    MCV 87.8 01/11/2022     01/11/2022     Lab Results   Component Value Date    GLUCOSE 117 (H) 01/11/2022    CALCIUM 9.5 01/11/2022     (L) 01/11/2022    K 4.2 01/11/2022    CO2 29 01/11/2022    CL 94 (L) 01/11/2022    BUN 15 01/11/2022    CREATININE 0.4 (L) 01/11/2022       Imaging  OSH imaging study reports reviewed      Assessment    CC:Metastic brain tumor form lung adenocarcinoma, s/p resection, complicated with hydrocephalus and pseudomeningocele, associated with persistent headaches and worsening gait instability, s/p an elective RF VPS, ADL and ambulatory dysfunction.     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 Her post op course was not complicated. She has urinary retention, but has been refusing cic per RN at [facility], she stated she can urinate by herself, her BM is fine, no confusion or significant agitation, her VS is fine, no new neurologic deficits. Functionally, she has ADL and ambul;atory dysfunction, requiring inpt acute rehab, transferred to [facility] on 1/10/22.    #Left cerebellar brain mass s/p SOC (11/22/2021) c/b  hydrocephalus, pseudomeningocele, resection cavity hemorrhage now s/p RF VPS, Delta 1.0 (1/6)  -regular Neuro Checks  -complete Dexamethasone taper course   -inpt acute rehab   -f/u with neurosurgeon, Rad-Onc, and Med-Onc as planned     #Pain Mangement  -regular pain scale evaluation  -Tylenol PRN, Oxycodone PRN, Flexeril PRN  -opioids precaution.    #Lung cancer (CMS-HCC)  -Holding home Keytruda, can restart in 2 weeks  -F/u with Medical Oncologist: Dr. Wang Batista ([facility])  -F/u with Radiation Oncologist: Dr. Mckinney, recently seen by Dr. Mills ([facility])     #Hyponatremia  -not surprising for intracranial tumor and surgery, c/w SIADH with urine osm 317 / Urine Na 86 / Serum Osm 274 / Serum Na 133 (1/5)  -Fluid restriction, salt tablets, Trend NA     #Post op Vision changes  -Post op (11/22): double / blurry vison, left eye ptosis, left eye nystagmus  -Seen by Ophthalmology last admission, scheduled for outpatient follow up in February     #Hypertension  -Continue home antihypertensives with adjustment   -monitor her BP, with orthostatic hypotension precaution.      #Esophagitis  -Continue home Lansoprazole , elevate HOB, diet modification, f/u with ENT     #Prophylaxis  -Per protocol     #Metastic brain tumor form lung adenocarcinoma, s/p resection, complicated with hydrocephalus and pseudomeningocele, associated with persistent headaches and worsening gait instability, s/p an elective RF VPS, ADL and ambulatory dysfunction  : inpt comprehensive acute rehab, ADL, gait and balancing training, regular neuro checks,  fall precaution, pain and medical management, DVT prophylaxis, dermal defense, f/u with neurosurgeon, medical and radiation oncologist as scheduled.        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

## 2022-01-11 NOTE — PROGRESS NOTES
Patient: Lisa Mast  Location: [facility] [address]  MRN: [id number]  Today's date: 1/11/2022    History of Present Illness  Lisa is a 66 y.o. female admitted on 1/10/2022 with Brain metastases (CMS/HCC) [C79.31]. Principal problem is Brain metastases (CMS/HCC).    66 yy.o. 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, radiation pneumonitis, esophageal stricture, and heart murmur presents for POV s/p SOC for resection of metastasis on 11/22/2021 presents with persistent headaches and worsening gait instability found to have hydrocephalus, pseudomeningocele, and interval increase of bleeding into resection cavity and is now s/p RF VPS, Delta 1.0 (1/6)     Neurosurgery note 1/10:   #Left cerebellar brain mass s/p SOC (11/22/2021) c/b hydrocephalus, pseudomeningocele, resection cavity hemorrhage now s/p RF VPS, Delta 1.0 (1/6)    #Post op Vision changes  -Post op (11/22): double / blurry vison, left eye ptosis, left eye nystagmus  -Seen by Ophthalmology last admission, scheduled for outpatient follow up in February    Past Medical History  Lisa has a past medical history of Hypertension, Lung cancer (CMS/HCC), and Lung mass.      PT Vitals    Date/Time Pulse BP BP Location BP Method Pt Position [facility]   01/11/22 1037 97 131/59 Left upper arm Automatic Sitting LB   01/11/22 1101 81 133/60 Left upper arm Automatic Sitting LB      PT Pain    Date/Time Pain Type Location Rating: Rest Rating: Activity Interventions [facility]   01/11/22 1037 -- head 8 8 premedicated for activity LB   01/11/22 1117 -- head 6 6 other (see comments) RN notified and Tylenol requested LB          Prior Living Environment      Most Recent Value   People in Home spouse   Current Living Arrangements home   Living Environment Comment [facility]. Pt resides on 1st floor   Number of Stairs, Main Entrance 1   Stair Railings, Main Entrance none   Stairs Comment, Main Entrance Reports  would assist PTA   Location, Patient  Bedroom first (main) floor   Location, Bathroom first (main) floor   Bathroom Access Comment Walk in shower with small threshold. Has shower chair and grab bars. Grab bars by toilet          Prior Level of Function      Most Recent Value   Dominant Hand right   Ambulation assistive person   Transferring assistive person   Toileting assistive person   Bathing independent   Dressing independent  [ setup clothing]   Prior Level of Function Comment CGA/Taye from spouse. Pt lives with  in [place], enjoys taking walks and playing tennis,  Pt is health  and works full time,  Pt shares responsibility of managing household finances and scheduling calendars.  They own property in [place] which pt helps to manage   Assistive Device Currently Used at Home grab bar, shower chair, other (see comments)  [transport chair]           IRF PT Evaluation and Treatment - 01/11/22 1037        PT Time Calculation    Start Time 1030     Stop Time 1130     Time Calculation (min) 60 min        Session Details    Document Type initial evaluation     Mode of Treatment physical therapy;individual therapy        General Information    General Observations of Patient Coopertive with encouragement. Reports sensitivity to light. Varying reports of pain     Existing Precautions/Restrictions fall;restraints   SRx4, 1500mL fluid restriction       Cognition/Psychosocial    Orientation Status (Cognition) oriented x 4        Sensory Assessment (Somatosensory)    Left LE Sensory Assessment light touch awareness;light touch localization;proprioception;intact   proprioception intact great toe    Right LE Sensory Assessment light touch awareness;light touch localization;proprioception;intact   proprioception intact great toe    Sensory Subjective Reports other (see comments)   denies sensory changes in BLE       Range of Motion (ROM)    Left Lower Extremity (ROM) left LE ROM is WFL except;ankle     Ankle, Left (ROM) Decreased  gastroc flexibility (DF to neutral with knee extended in supine); all other planes WFL     Right Lower Extremity (ROM) right LE ROM is WFL except;ankle     Ankle, Right (ROM) Decreased gastroc flexibility (DF to neutral with knee extended in supine); all other planes WFL        Strength (Manual Muscle Testing)    Hip, Left (Strength) Grossly 4-/5 all planes     Knee, Left (Strength) Grossly 4/5 all planes     Ankle, Left (Strength) Grossly 4/5 all planes     Hip, Right (Strength) Grossly 4/5 all planes     Knee, Right (Strength) Grossly 4/5 all planes     Ankle, Right (Strength) Grossly 4/5 all planes        Bed Mobility    [place], Roll Left close supervision     [place], Roll Right close supervision     [place], Supine to Sit close supervision     [place], Sit to Supine close supervision     Assistive Device none        Bed to Chair Transfer    [place], Bed to Chair minimum assist (75% or more patient effort)     Verbal Cues hand placement;safety     Comment SPT without AD        Chair to Bed Transfer    [place], Chair to Bed minimum assist (75% or more patient effort)     Verbal Cues hand placement;safety     Comment SPT without AD        Sit to Stand Transfer    [place], Sit to Stand Transfer minimum assist (75% or more patient effort)     Verbal Cues hand placement;safety     Assistive Device none;gait belt        Stand to Sit Transfer    [place], Stand to Sit Transfer minimum assist (75% or more patient effort)     Verbal Cues hand placement;safety     Assistive Device none;gait belt        Car Transfer    [place], Car Transfer not tested     Comment Deferred at this time d/t reports of dizziness with mobility; plan to assess on 1/12/22        Gait Training    [place], Gait minimum assist (75% or more patient effort)     Assistive Device none;gait belt     Distance in Feet 50 feet     Deviations/Abnormal Patterns (Gait) base of support, narrow;gait speed  "decreased;stride length decreased;left sided deviations     Left Sided Gait Deviations heel strike decreased     Advanced Gait Activity sloped surfaces;10 Meter Walk Test (Self-Selected Velocity)     [place], Picking Up Object unable to assess   unsafe at this time    Comment (Gait/Stairs) L arm-over with PT overall providing Min A for balance, distance limited by c/o dizziness, BP stable in sitting following gait        Curb Negotiation    [place] not tested     Comment Deferred at this time d/t reports of dizziness with mobility; plan to assess on 1/12/22        Sloped Surface Gait Skills    [place] not tested     Comment Deferred at this time d/t reports of dizziness with mobility; plan to assess on 1/12/22        10 Meter Walk Test (Self-Selected Velocity)    Trial One: Ten Meter Walk Test (sec) 9.94 seconds     Trial Two: Ten Meter Walk Test (sec) 8.43 seconds     Assistive Device none   L arm-over, Min A    Trial One: Gait Speed (m/s) 0.6 m/s     Trial Two: Gait Speed (m/s) 0.71 m/s     Average Gait Speed (m/s): Two Trials 0.66 m/s        Stairs Training    [place], Stairs not tested     Comment Deferred at this time d/t reports of dizziness with mobility; plan to assess on 1/12/22        Wheelchair Mobility/Management    Comment, Wheelchair Mobility PT requested 16x20\" recliner to better meet seating needs of patient. W/C only utilized for transportation purposes within hospital settting.        Safety Issues, Functional Mobility    Safety Issues Affecting Function (Mobility) impulsivity;insight into deficits/self-awareness;safety precaution awareness        Balance    Static Sitting Balance WFL     Dynamic Sitting Balance mild impairment     Static Standing Balance mild impairment     Dynamic Standing Balance moderate impairment     Comment, Balance Joseph Balance Scale TBA        Motor Skills    Coordination left;lower extremity;gross motor deficit;moderate impairment;heel to " shin;right;WFL     Functional Endurance Fair     Muscle Tone bilateral;lower extremity(s);WNL        Postural Deviations    Postural Deviations head and neck;shoulder;upper back;pelvis     Head and Neck forward head     Shoulder left shoulder forward;right shoulder forward     Upper Back abducted scapulae     Pelvis posterior pelvic tilt        Gait/Walking Locomotion Goal 1    Distance 100 feet        Gait/Walking Locomotion Goal 2    Distance 200 feet        Patient/Family Goals    Patient's Goals For Discharge other (see comments)   walking, improve balance, reduce headaches, improve L vision       Therapy Assessment/Plan (PT)    Rehab Potential/Prognosis (PT) adequate, monitor progress closely;good, to achieve stated therapy goals     Frequency of Treatment (PT) 60-90 minutes per day;5-7 times per week     Estimated Duration of Therapy (PT) 3 weeks     Problem List (PT) problems related to;balance;cognition;coordination;mobility;strength;pain;vision     Activity Limitations Related to Problem List unable to ambulate safely;unable to transfer safely     Planned Therapy Interventions balance training;bed mobility training;gait training;home exercise program;motor coordination training;neuromuscular re-education;patient/family education;postural re-education;stair training;strengthening;stretching;transfer training     Comment, Therapy Assessment/Plan (PT) Patient is a 67 y/o female admitted to [facility] s/p VPS placement with h/o L cerebellar brain mass resection (11/2021). At this time, the patient is functioning below her baseline. She currently requires Min A for transfers and short-distance ambulation with arm-over support for balance. She was unable to tolerate elevations during evaluation d/t decreased activity tolerance and reports of dizziness with standing/ambulation (BP stable). Patient’s self-selected gait speed of 0.66 m/s is significantly below age/gender-matched norms (i.e., 1.30 m/s). Plan to assess Joseph  Balance Scale in upcoming PT session as tolerated. Patient will continue to benefit from I/P PT to address above-stated impairments, maximize independence, and decrease burden of care. BRANDON pending team discussion.                 Education Documentation  Call Light Use, taught by Bainbridge, Leslie, PT at 1/11/2022 12:53 PM.  Learner: Patient  Readiness: Acceptance  Method: Explanation  Response: Verbalizes Understanding  Comment: Patient educated on PT POC and goals, therapy schedule, use of call bell    Treatment Plan, taught by Bainbridge, Leslie, PT at 1/11/2022 12:53 PM.  Learner: Patient  Readiness: Acceptance  Method: Explanation  Response: Verbalizes Understanding  Comment: Patient educated on PT POC and goals, therapy schedule, use of call bell    Orientation to Care Setting, Routine, taught by Bainbridge, Leslie, PT at 1/11/2022 12:53 PM.  Learner: Patient  Readiness: Acceptance  Method: Explanation  Response: Verbalizes Understanding  Comment: Patient educated on PT POC and goals, therapy schedule, use of call bell          IRF PT Goals      Most Recent Value   Bed Mobility Goal 1    Activity/Assistive Device sit to supine/supine to sit, rolling to left, rolling to right at 01/11/2022 1037   [place] supervision required at 01/11/2022 1037   Time Frame short-term goal (STG), 1 week at 01/11/2022 1037   Bed Mobility Goal 2    Activity/Assistive Device sit to supine/supine to sit, rolling to left, rolling to right at 01/11/2022 1037   [place] modified independence at 01/11/2022 1037   Time Frame long-term goal (LTG), 21 days or less at 01/11/2022 1037   Transfer Goal 1    Activity/Assistive Device sit-to-stand/stand-to-sit, stand pivot, no assistive device at 01/11/2022 1037   [place] minimum assist (75% or more patient effort)  [steadying assist] at 01/11/2022 1037   Time Frame short-term goal (STG), 1 week at 01/11/2022 1037   Transfer Goal 2    Activity/Assistive Device  sit-to-stand/stand-to-sit, bed-to-chair/chair-to-bed, car transfer, stand pivot, no assistive device at 01/11/2022 1037   [place] supervision required at 01/11/2022 1037   Time Frame long-term goal (LTG), 21 days or less at 01/11/2022 1037   Gait/Walking Locomotion Goal 1    Activity/Assistive Device gait (walking locomotion), no assistive device at 01/11/2022 1037   Distance 100 feet at 01/11/2022 1037   [place] minimum assist (75% or more patient effort) at 01/11/2022 1037   Time Frame short-term goal (STG), 1 week at 01/11/2022 1037   Gait/Walking Locomotion Goal 2    Activity/Assistive Device gait (walking locomotion), no assistive device at 01/11/2022 1037   Distance 200 feet at 01/11/2022 1037   [place] supervision required at 01/11/2022 1037   Time Frame long-term goal (LTG), 21 days or less at 01/11/2022 1037

## 2022-01-12 ENCOUNTER — APPOINTMENT (INPATIENT)
Dept: SPEECH THERAPY | Facility: REHABILITATION | Age: 67
DRG: 949 | End: 2022-01-12
Payer: COMMERCIAL

## 2022-01-12 ENCOUNTER — APPOINTMENT (INPATIENT)
Dept: PHYSICAL THERAPY | Facility: REHABILITATION | Age: 67
DRG: 949 | End: 2022-01-12
Payer: COMMERCIAL

## 2022-01-12 ENCOUNTER — APPOINTMENT (INPATIENT)
Dept: OCCUPATIONAL THERAPY | Facility: REHABILITATION | Age: 67
DRG: 949 | End: 2022-01-12
Payer: COMMERCIAL

## 2022-01-12 LAB
GLUCOSE BLD-MCNC: 134 MG/DL (ref 70–99)
GLUCOSE BLD-MCNC: 97 MG/DL (ref 70–99)
GLUCOSE BLD-MCNC: 98 MG/DL (ref 70–99)
POCT TEST: ABNORMAL
POCT TEST: NORMAL
POCT TEST: NORMAL

## 2022-01-12 PROCEDURE — 97130 THER IVNTJ EA ADDL 15 MIN: CPT | Mod: GO

## 2022-01-12 PROCEDURE — 97530 THERAPEUTIC ACTIVITIES: CPT | Mod: GP,59

## 2022-01-12 PROCEDURE — 92507 TX SP LANG VOICE COMM INDIV: CPT | Mod: GN

## 2022-01-12 PROCEDURE — 12800001 HC ROOM AND CARE SEMIPRIVATE REHAB-BRAIN INJ

## 2022-01-12 PROCEDURE — 63700000 HC SELF-ADMINISTRABLE DRUG: Performed by: INTERNAL MEDICINE

## 2022-01-12 PROCEDURE — 63700000 HC SELF-ADMINISTRABLE DRUG: Performed by: PHYSICAL MEDICINE & REHABILITATION

## 2022-01-12 PROCEDURE — 97129 THER IVNTJ 1ST 15 MIN: CPT | Mod: GO

## 2022-01-12 PROCEDURE — 97110 THERAPEUTIC EXERCISES: CPT | Mod: GP,59

## 2022-01-12 PROCEDURE — 63600000 HC DRUGS/DETAIL CODE: Performed by: PHYSICAL MEDICINE & REHABILITATION

## 2022-01-12 PROCEDURE — 97530 THERAPEUTIC ACTIVITIES: CPT | Mod: GO,59

## 2022-01-12 PROCEDURE — 97116 GAIT TRAINING THERAPY: CPT | Mod: GP

## 2022-01-12 RX ORDER — TERAZOSIN 1 MG/1
2 CAPSULE ORAL NIGHTLY
Status: DISCONTINUED | OUTPATIENT
Start: 2022-01-12 | End: 2022-01-13

## 2022-01-12 RX ADMIN — OXYCODONE HYDROCHLORIDE 5 MG: 5 TABLET ORAL at 23:41

## 2022-01-12 RX ADMIN — HEPARIN SODIUM 5000 UNITS: 5000 INJECTION, SOLUTION INTRAVENOUS; SUBCUTANEOUS at 05:36

## 2022-01-12 RX ADMIN — HEPARIN SODIUM 5000 UNITS: 5000 INJECTION, SOLUTION INTRAVENOUS; SUBCUTANEOUS at 15:46

## 2022-01-12 RX ADMIN — CHLORHEXIDINE GLUCONATE 0.12% ORAL RINSE 15 ML: 1.2 LIQUID ORAL at 08:09

## 2022-01-12 RX ADMIN — CHLORHEXIDINE GLUCONATE 0.12% ORAL RINSE 15 ML: 1.2 LIQUID ORAL at 21:36

## 2022-01-12 RX ADMIN — DOCUSATE SODIUM 100 MG: 100 CAPSULE, LIQUID FILLED ORAL at 08:09

## 2022-01-12 RX ADMIN — DEXAMETHASONE 2 MG: 2 TABLET ORAL at 08:09

## 2022-01-12 RX ADMIN — HEPARIN SODIUM 5000 UNITS: 5000 INJECTION, SOLUTION INTRAVENOUS; SUBCUTANEOUS at 21:36

## 2022-01-12 RX ADMIN — OXYCODONE HYDROCHLORIDE 5 MG: 5 TABLET ORAL at 08:10

## 2022-01-12 RX ADMIN — PANTOPRAZOLE SODIUM 40 MG: 40 TABLET, DELAYED RELEASE ORAL at 08:09

## 2022-01-12 RX ADMIN — DEXAMETHASONE 2 MG: 2 TABLET ORAL at 12:21

## 2022-01-12 RX ADMIN — DOCUSATE SODIUM 100 MG: 100 CAPSULE, LIQUID FILLED ORAL at 21:36

## 2022-01-12 RX ADMIN — DEXAMETHASONE 2 MG: 2 TABLET ORAL at 15:46

## 2022-01-12 RX ADMIN — LOSARTAN POTASSIUM 50 MG: 50 TABLET, FILM COATED ORAL at 17:46

## 2022-01-12 RX ADMIN — ACETAMINOPHEN 650 MG: 325 TABLET, FILM COATED ORAL at 13:12

## 2022-01-12 RX ADMIN — DEXAMETHASONE 2 MG: 2 TABLET ORAL at 21:36

## 2022-01-12 RX ADMIN — TERAZOSIN HYDROCHLORIDE 2 MG: 1 CAPSULE ORAL at 21:35

## 2022-01-12 RX ADMIN — SENNOSIDES 2 TABLET: 8.6 TABLET, FILM COATED ORAL at 08:09

## 2022-01-12 ASSESSMENT — BALANCE ASSESSMENTS: TOTAL SCORE: 22

## 2022-01-12 NOTE — PROGRESS NOTES
Patient: Lisa Mast  [facility] [address]  MRN: [id number]  Today's date: 1/12/2022    History of Present Illness  Lisa is a 66 y.o. female admitted on 1/10/2022 with Brain metastases (CMS/HCC) [C79.31]. Principal problem is Brain metastases (CMS/HCC).    66 yy.o. 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, radiation pneumonitis, esophageal stricture, and heart murmur presents for POV s/p SOC for resection of metastasis on 11/22/2021 presents with persistent headaches and worsening gait instability found to have hydrocephalus, pseudomeningocele, and interval increase of bleeding into resection cavity and is now s/p RF VPS, Delta 1.0 (1/6)     Neurosurgery note 1/10:   #Left cerebellar brain mass s/p SOC (11/22/2021) c/b hydrocephalus, pseudomeningocele, resection cavity hemorrhage now s/p RF VPS, Delta 1.0 (1/6)    #Post op Vision changes  -Post op (11/22): double / blurry vison, left eye ptosis, left eye nystagmus  -Seen by Ophthalmology last admission, scheduled for outpatient follow up in February    Past Medical History  Lisa has a past medical history of Hypertension, Lung cancer (CMS/HCC), and Lung mass.      SLP Pain    Date/Time Pain Type Location Rating: Rest Interventions [facility]   01/12/22 1131 Pain Assessment head 4 - moderate pain diversional activity provided AFL   01/12/22 1159 Pain Reassessment other (see comments) esphogus 8 - severe pain relaxation techniques promoted AFL          Prior Living Environment      Most Recent Value   People in Home spouse   Current Living Arrangements home   Living Environment Comment 2STH. Pt resides on 1st floor   Number of Stairs, Main Entrance 1   Stair Railings, Main Entrance none   Stairs Comment, Main Entrance Reports  would assist PTA   Location, Patient Bedroom first (main) floor   Location, Bathroom first (main) floor   Bathroom Access Comment Walk in shower with small threshold. Has shower chair and  "grab bars. Grab bars by toilet          Prior Level of Function      Most Recent Value   Dominant Hand right   Ambulation assistive person   Transferring assistive person   Toileting assistive person   Bathing independent   Dressing independent  [ setup clothing]   Prior Level of Function Comment CGA/Taye from spouse. Pt lives with  in [place], enjoys taking walks and playing tennis,  Pt is health  and works full time,  Pt shares responsibility of managing household finances and scheduling calendars.  They own property in [place] which pt helps to manage   Assistive Device Currently Used at Home grab bar, shower chair, other (see comments)  [transport chair]           IRF SLP Evaluation and Treatment - 01/12/22 1131        SLP Time Calculation    Start Time 1130     Stop Time 1200     Time Calculation (min) 30 min        Session Details    Document Type daily treatment/progress note     Mode of Treatment individual therapy;speech language pathology        General Information    General Observations of Patient Pt in bed; c/o headache - back of head.  7/10 at start of session        Cognition/Psychosocial    Comment, Attention pt's headache and refux interfered with pt's overall particiation tin cognitive tasks today.     Comment, Short Term Memory Immediate memory task with menu - repetition x 3 of items at patient's request.  Memory manipulation of 3 word in abc order 5/5; 4 words 2/3 correct - task discontinue due to pt reporting GI reflux.        Motor Speech    Comment, Motor Speech Intervention Education: Speech strategies handout provided and reviewed with patient;        Daily Progress Summary (SLP)    Daily Outcome Statement Pt initially able to participate in therapy, but after 20 min required frequent breaks due to \"reflux\" per patient report; pt declined elevating head; small sips of water; or nursing assistance.  Pt stated it is from her past radiation.  Pt was provided with " speech strategies handout.  Continue with POC per primary therapist.                 Education Documentation  Communication, taught by Elisha Kemp CCC-SLP at 1/12/2022 12:06 PM.  Learner: Patient  Readiness: Acceptance  Method: Explanation, Handout  Response: Needs Reinforcement  Comment: Provided pt with speech strategies handout; reviewed with patient          IRF SLP Goals      Most Recent Value   Motor Speech/Voice Goal 1    Motor Speech/Voice Goal 1 use strategies for articulatory precision in simple to mod level speech tasks x 80% mod cues at 01/11/2022 0915   Time Frame short-term goal (STG), 1 week at 01/11/2022 0915   Motor Speech/Voice Goal 2    Motor Speech/Voice Goal 2 use strategies for articulatory precision in mod to high level speech tasks x 90% min cues at 01/11/2022 0915   Time Frame long-term goal (LTG), 3 weeks at 01/11/2022 0915   Oral Nutrition/Hydration Goal 1    Activity effective/safe/independent, oral nutrition/hydration, use of swallowing techniques  [tolerate regular and thins with use of small bites/sips strategy x 90% min cues] at 01/11/2022 0915   Time Frame short-term goal (STG), 1 week at 01/11/2022 0915   Oral Nutrition/Hydration Goal 2    Activity effective/safe/independent  [use swallow strategies x 100%[place]] at 01/11/2022 0915   Time Frame long-term goal (LTG), 3 weeks at 01/11/2022 0915   Attention Goal 1    Activity maintain focused/sustained attention, perform selective attention tasks, perform alternating attention tasks, for 10 minutes  [mod level tasks] at 01/11/2022 0915   [place]/Cues with moderate, verbal cues/redirection at 01/11/2022 0915   Time Frame short-term goal (STG), 1 week at 01/11/2022 0915   Attention Goal 2    Activity maintain focused/sustained attention, perform selective attention tasks, perform alternating attention tasks, for 20-30 minutes at 01/11/2022 0915   [place]/Cues independently at 01/11/2022 0915   Time Frame long-term goal  (LTG), 3 weeks at 01/11/2022 0915   Executive Function Goal 1    Activity abstract thinking tasks, insight/awareness of deficits, organization/sequencing tasks, planning/decision-making tasks, problem-solving/reasoning tasks  [mod level] at 01/11/2022 0915   [place]/Accuracy with 80% accuracy, with moderate, verbal cues/redirection at 01/11/2022 0915   Time Frame short-term goal (STG), 1 week at 01/11/2022 0915   Executive Function Goal 2    Activity abstract thinking tasks, insight/awareness of deficits, organization/sequencing tasks, planning/decision-making tasks, problem-solving/reasoning tasks at 01/11/2022 0915   [place]/Accuracy with 90% accuracy, with minimum, verbal cues/redirection at 01/11/2022 0915   Time Frame long-term goal (LTG), 3 weeks at 01/11/2022 0915   Memory Goal 1    Activity short-term memory tasks, working memory tasks, prospective memory tasks, recall recent events  [mod level] at 01/11/2022 0915   [place]/Accuracy with 80% accuracy, moderate cues for use of strategies at 01/11/2022 0915   Memory Goal 2    Activity short-term memory tasks, working memory tasks, prospective memory tasks, recall recent events at 01/11/2022 0915   [place]/Accuracy with 90% accuracy, minimal cues for use of strategies at 01/11/2022 0915   Time Frame long-term goal (LTG), 3 weeks at 01/11/2022 0915

## 2022-01-12 NOTE — SUBJECTIVE & OBJECTIVE
Patient was seen and examined.   Attestation Notes: Face to face encounter completed and patient discussed in team conference    Subjective     Interval History: has no complaint of headache, fever, chills, sweats, abd discomfort, new numbness, tingling or weakness... spoke with nursing and  and there were no overnight issues reported. CS for bed mobility, min A for transfers, min A for ambulation with gait belt x 50ft.  History also provided by:     Objective     Vital signs in last 24 hours:  Temp:  [36.6 °C (97.9 °F)] 36.6 °C (97.9 °F)  Heart Rate:  [55-78] 55  Resp:  [16-18] 18  BP: (123-141)/(70-78) 132/71    No intake or output data in the 24 hours ending 01/12/22 1121  Intake/Output this shift:  No intake/output data recorded.    Review of Systems:  All other systems reviewed and negative except as noted in the HPI.    Labs  No new labs.    Imaging  Not applicable    VTE Assessment: TBD    Full Code    Physical Exam  Physical Exam  Constitutional:       Appearance: Normal appearance. She is well-developed.   HENT:      Head: Normocephalic and atraumatic.     Eyes:      Extraocular Movements:      Right eye: Abnormal extraocular motion present.      Left eye: Abnormal extraocular motion present.      Conjunctiva/sclera: Conjunctivae normal.      Pupils: Pupils are equal, round, and reactive to light.   Cardiovascular:      Rate and Rhythm: Normal rate and regular rhythm.   Pulmonary:      Effort: Pulmonary effort is normal.      Breath sounds: Normal breath sounds.   Abdominal:      General: Bowel sounds are normal.      Palpations: Abdomen is soft.       Genitourinary:     Comments: No hi catheter  Musculoskeletal:         General: Normal range of motion.      Comments: No jt erythema, warmth or effusion   Skin:     General: Skin is warm and dry.   Neurological:      Mental Status: She is alert and oriented to person, place, and time.      Cranial Nerves: No cranial nerve deficit.      Sensory: No sensory  deficit.      Motor: Weakness present. No tremor, abnormal muscle tone or seizure activity.      Coordination: Coordination abnormal.      Gait: Gait abnormal.      Comments: Fluent, naming and repetition intact, follows commands, facial muscle symmetric, tongue midline on protrusion, sensation to light touch intact, testing reveals 4/5 strength throughout left side, 5/5 strength right side, coordination L side noted on ROSS, HTS, no fix or drift, tone 0/4, no clonus.    Psychiatric:         Mood and Affect: Mood is depressed.         Speech: Speech normal.         Behavior: Behavior normal.             Plan of care was discussed with patient

## 2022-01-12 NOTE — PROGRESS NOTES
Patient: Lisa Mast  Location: [facility] [address]  MRN: [id number]  Today's date: 1/12/2022    History of Present Illness  Lisa is a 66 y.o. female admitted on 1/10/2022 with Brain metastases (CMS/HCC) [C79.31]. Principal problem is Brain metastases (CMS/HCC).    66 yy.o. 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, radiation pneumonitis, esophageal stricture, and heart murmur presents for POV s/p SOC for resection of metastasis on 11/22/2021 presents with persistent headaches and worsening gait instability found to have hydrocephalus, pseudomeningocele, and interval increase of bleeding into resection cavity and is now s/p RF VPS, Delta 1.0 (1/6)     Neurosurgery note 1/10:   #Left cerebellar brain mass s/p SOC (11/22/2021) c/b hydrocephalus, pseudomeningocele, resection cavity hemorrhage now s/p RF VPS, Delta 1.0 (1/6)    #Post op Vision changes  -Post op (11/22): double / blurry vison, left eye ptosis, left eye nystagmus  -Seen by Ophthalmology last admission, scheduled for outpatient follow up in February    Past Medical History  Lisa has a past medical history of Hypertension, Lung cancer (CMS/HCC), and Lung mass.      PT Vitals    Date/Time Pulse BP BP Location BP Method Pt Position [facility]   01/12/22 1305 75 110/70 Left upper arm Automatic Sitting LB   01/12/22 1327 70 123/66 Left upper arm Automatic Sitting LB      PT Pain    Date/Time Pain Type Location Rating: Rest Rating: Activity Rating: Rest Rating: Activity Interventions [facility]   01/12/22 1305 -- head -- -- 6 - moderate-severe pain 6 - moderate-severe pain medication administered by RN during PT session LB   01/12/22 1355 -- head 7 7 -- -- premedicated for activity LB          Prior Living Environment      Most Recent Value   People in Home spouse   Current Living Arrangements home   Living Environment Comment 2STH. Pt resides on 1st floor   Number of Stairs, Main Entrance 1   Stair Railings, Main Entrance none    Stairs Comment, Main Entrance Reports  would assist PTA   Location, Patient Bedroom first (main) floor   Location, Bathroom first (main) floor   Bathroom Access Comment Walk in shower with small threshold. Has shower chair and grab bars. Grab bars by toilet          Prior Level of Function      Most Recent Value   Dominant Hand right   Ambulation assistive person   Transferring assistive person   Toileting assistive person   Bathing independent   Dressing independent  [ setup clothing]   Prior Level of Function Comment CGA/Taye from spouse. Pt lives with  in [place], enjoys taking walks and playing tennis,  Pt is health  and works full time,  Pt shares responsibility of managing household finances and scheduling calendars.  They own property in [place] which pt helps to manage   Assistive Device Currently Used at Home grab bar, shower chair, other (see comments)  [transport chair]           IRF PT Evaluation and Treatment - 01/12/22 1327        PT Time Calculation    Start Time 1300     Stop Time 1400     Time Calculation (min) 60 min        Session Details    Document Type daily treatment/progress note     Mode of Treatment physical therapy;individual therapy        Sit to Stand Transfer    [place], Sit to Stand Transfer minimum assist (75% or more patient effort)     Verbal Cues hand placement;safety     Assistive Device none     Comment Impulsive with attempts to stand prior to legrest removal/locking W/C brakes        Stand to Sit Transfer    [place], Stand to Sit Transfer minimum assist (75% or more patient effort)     Verbal Cues hand placement;safety     Assistive Device none        Stand Pivot Transfer    [place], Stand Pivot/Stand Step Transfer minimum assist (75% or more patient effort)     Verbal Cues safety;technique     Assistive Device none     Comment via ambulatory approach        Gait Training    [place], Gait minimum assist (75% or more  patient effort)     Assistive Device none     Distance in Feet 150 feet   x2    Deviations/Abnormal Patterns (Gait) base of support, narrow;gait speed decreased;stride length decreased;weight shifting decreased;right sided deviations     Left Sided Gait Deviations --   decreased L WS    Right Sided Gait Deviations --   decreased R knee ext in stance    [place], Picking Up Object --     Comment (Gait/Stairs) L arm-over assist progressing to L HHA then Min A at hips for WS and balance        Stairs Training    [place], Stairs minimum assist (75% or more patient effort)     Assistive Device railing     Handrail Location (Stairs) both sides     Number of Stairs 4     Stair Height 6 inches     Ascending Stairs Technique step-over-step     Descending Stairs Technique step-over-step     Comment VC for sequencing and safety with Min A for balance        Balance    Balance Test Results Joseph Balance Scale        Joseph Balance Scale    Sitting to Standing Able to stand independently using hands     Standing Unsupported Able to stand 2 minutes with supervision     Sitting with Back Unsupported but Feet Supported on Floor or on a Stool Able to sit safely and securely for 2 minutes     Standing to Sitting Controls descent by using hands     Transfers Able to transfer with verbal cueing and/or supervision     Standing Unsupported with Eyes Closed Able to stand 10 seconds with supervision     Standing Unsupported with Feet Together Needs help to attain position and unable to hold for 15 seconds     Reach Forward with Outstretched Arm While Standing Reaches forward but needs supervision      Object from Floor from a Standing Position Unable to try/needs assist to keep from losing balance or falling   declined to attempt d/t headache    Turning to Look Behind Over Left and Right Shoulders While Standing Needs supervision when turning     Turn 360 Degrees Needs assistance while turning     Place Alternate Foot on Step  or Stool While Standing Unsupported Able to complete greater than 2 steps needs minimal assist     Standing Unsupported One Foot in Front Needs help to step but can hold 15 seconds     Standing on One Leg Unable to try needs assist to prevent fall     Joseph Balance Score 22        Lower Extremity (Therapeutic Exercise)    Exercise Position/Type standing     Comment Standing with Min A and BUE support anteriorly on raised table: mini squats 10x3, heel raises 10x3, hip ABD 10x3 per LE        Daily Progress Summary (PT)    Daily Outcome Statement Patient scored 22/56 on Joseph Balance Scale, indicating increased risk for falls per score <45/56. Able to initiate stairs this session. Patient limited by decreased insight into deficits. Will continue to benefit from balance training.                    IRF PT Goals      Most Recent Value   Bed Mobility Goal 1    Activity/Assistive Device sit to supine/supine to sit, rolling to left, rolling to right at 01/11/2022 1037   [place] supervision required at 01/11/2022 1037   Time Frame short-term goal (STG), 1 week at 01/11/2022 1037   Bed Mobility Goal 2    Activity/Assistive Device sit to supine/supine to sit, rolling to left, rolling to right at 01/11/2022 1037   [place] modified independence at 01/11/2022 1037   Time Frame long-term goal (LTG), 21 days or less at 01/11/2022 1037   Transfer Goal 1    Activity/Assistive Device sit-to-stand/stand-to-sit, stand pivot, no assistive device at 01/11/2022 1037   [place] minimum assist (75% or more patient effort)  [steadying assist] at 01/11/2022 1037   Time Frame short-term goal (STG), 1 week at 01/11/2022 1037   Transfer Goal 2    Activity/Assistive Device sit-to-stand/stand-to-sit, bed-to-chair/chair-to-bed, car transfer, stand pivot, no assistive device at 01/11/2022 1037   [place] supervision required at 01/11/2022 1037   Time Frame long-term goal (LTG), 21 days or less at 01/11/2022 1037   Gait/Walking  Locomotion Goal 1    Activity/Assistive Device gait (walking locomotion), no assistive device at 01/11/2022 1037   Distance 100 feet at 01/11/2022 1037   [place] minimum assist (75% or more patient effort) at 01/11/2022 1037   Time Frame short-term goal (STG), 1 week at 01/11/2022 1037   Gait/Walking Locomotion Goal 2    Activity/Assistive Device gait (walking locomotion), no assistive device at 01/11/2022 1037   Distance 200 feet at 01/11/2022 1037   [place] supervision required at 01/11/2022 1037   Time Frame long-term goal (LTG), 21 days or less at 01/11/2022 1037   Stairs Goal 1    Activity/Assistive Device ascending stairs, descending stairs, using handrail, left, using handrail, right at 01/12/2022 1327   Number of Stairs 8 at 01/12/2022 1327   [place] minimum assist (75% or more patient effort) at 01/12/2022 1327   Time Frame short-term goal (STG), 1 week at 01/12/2022 1327   Stairs Goal 2    Activity/Assistive Device ascending stairs, descending stairs, using handrail, left, using handrail, right at 01/12/2022 1327   Number of Stairs 12 at 01/12/2022 1327   [place] minimum assist (75% or more patient effort)  [steadying assist] at 01/12/2022 1327   Time Frame long-term goal (LTG), 21 days or less at 01/12/2022 1327

## 2022-01-12 NOTE — PLAN OF CARE
"Plan of Care Review  Plan of Care Reviewed With: patient  Progress: improving  Outcome Summary: AA x 3, steadying assistance needed for ambulation and transfers, impulsive. Patient agreed to one PVR after inability to void, >625, Dr. Alamo aware. Patient educated about urinary retension, she states she doesn't like to attended to in the BR and this nurse assured her that her privacy will be respected and she will be supervised for safety. Patient states she understands \"the policies\" and would like to work as a team. Patient denies urinary pressure or urge to void, abdomen is soft and non-tender. No BM this shift, patient reports last BM 1/10/22, prune juice offer and refused. + flatus. Denies N/V, poor appetite, 25 % of breakfast and 25% lunch patient re-educated on 1500 cc fluid restriction. + Slurred speah  "

## 2022-01-12 NOTE — NURSING NOTE
"Incontinent of bowel, PVR s/p toileting > 725 cc, no urge to void, discussed urinary retension with patient but exhibits poor insight and judgement and refusing cath stating that \"swallowing difficulty makes urinating difficult\", Dr. Alamo made aware.  "

## 2022-01-12 NOTE — PROGRESS NOTES
[facility] Internal Medicine Progress Note          Patient was seen and examined at bedside.    Subjective:  No new neurologic deficits, her VS stable, no O/N events or new nursing issues, tolerated her PT/OT. She has been refusing straight cath even though with PVR sometimes >=500 ml per RN, but she can urinate by self eventually.   Denies nausea, vomiting, abdominal pain or discomfort, dysuria, cough/sputum, running nose, sore throat, chest pain, palpitation, SOB or orthopnea, dizziness or LH,  HA.    Objective   Vital signs in last 24 hours:  Temp:  [36.6 °C (97.9 °F)] 36.6 °C (97.9 °F)  Heart Rate:  [55-78] 71  Resp:  [16-18] 18  BP: (123-141)/(62-78) 129/62    No intake or output data in the 24 hours ending 01/12/22 1127  Intake/Output this shift:  No intake/output data recorded.   Review of Systems:  All other systems reviewed and negative except as noted in the HPI.   Objective      Labs  reviewed her labs thoroughly   Lab Results   Component Value Date    WBC 7.86 01/11/2022    HGB 12.5 01/11/2022    HCT 37.4 01/11/2022    MCV 87.8 01/11/2022     01/11/2022     Lab Results   Component Value Date    GLUCOSE 117 (H) 01/11/2022    CALCIUM 9.5 01/11/2022     (L) 01/11/2022    K 4.2 01/11/2022    CO2 29 01/11/2022    CL 94 (L) 01/11/2022    BUN 15 01/11/2022    CREATININE 0.4 (L) 01/11/2022       Imaging  OSH imaging study reports reviewed       Full Code    Physical Exam:  Head/Ear/Nose/Throat: scalp incisional site clean, no discharge or bleeding, focal c/d/i; moisture mouth mm, no oropharyngeal thrush noted.   Eyes: anicteric sclera, EOMI; PERRL.   Neck : supple, no JVD, no carotid bruits appeciated.   Respiratory: no evidence of labored breathing, lung sounds CTA b/l, good aeration bibasilar area, no w/r/c.   Cardiovascular: RRR; normal S1, S2; no m/r/g; no S3 or S4.   Gastrointestinal: soft; NT; BS normal; mildly distended; no CVAT b/l.   Genitourinary: no hi.   Extremities : no  c/c/e .   Neurological: AO x 3, fluent speeches, following commands, CNS II-XII grossly intact; no focal neurologic deficits.   Behavior/Emotional: in NAD, appropriate; cooperative.   Skin: clean, dry and intact.     Plan of care was discussed with patient, RN, and PMR attending     Assessment   CC:Metastic brain tumor form lung adenocarcinoma, s/p resection, complicated with hydrocephalus and pseudomeningocele, associated with persistent headaches and worsening gait instability, s/p an elective RF VPS, ADL and ambulatory dysfunction.      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 Her post op course was not complicated. She has urinary retention, but has been refusing cic per RN at [facility], she stated she can urinate by herself, her BM is fine, no confusion or significant agitation, her VS is fine, no new neurologic deficits. Functionally, she has ADL and ambul;atory dysfunction, requiring inpt acute rehab, transferred to [facility] on 1/10/22.    #Left cerebellar brain mass s/p SOC (11/22/2021) c/b hydrocephalus, pseudomeningocele, resection cavity hemorrhage now s/p RF VPS, Delta 1.0 (1/6)  -regular Neuro Checks  -complete Dexamethasone taper course   -inpt acute rehab   -f/u with neurosurgeon, Rad-Onc, and Med-Onc as planned     #Pain Mangement  -regular pain scale evaluation  -Tylenol PRN, Oxycodone PRN, Flexeril PRN  -opioids precaution.     #Lung cancer (CMS-HCC)  -Holding home Keytruda, can restart in 2 weeks  -F/u with Medical Oncologist: Dr. Wang Batista ([facility])  -F/u with Radiation  Oncologist: Dr. Mckinney, recently seen by Dr. Mills ([facility])     #Hyponatremia  -not surprising for intracranial tumor and surgery, c/w SIADH with urine osm 317 / Urine Na 86 / Serum Osm 274 / Serum Na 133 (1/5)  -Fluid restriction, salt tablets, Trend NA     #Post op Vision changes  -Post op (11/22): double / blurry vison, left eye ptosis, left eye nystagmus  -Seen by Ophthalmology last admission, scheduled for outpatient follow up in February     #Hypertension  -Continue home antihypertensives with adjustment   -monitor her BP, with orthostatic hypotension precaution.      #Esophagitis  -Continue home Lansoprazole , elevate HOB, diet modification, f/u with ENT     #Prophylaxis  -Per protocol     #Metastic brain tumor form lung adenocarcinoma, s/p resection, complicated with hydrocephalus and pseudomeningocele, associated with persistent headaches and worsening gait instability, s/p an elective RF VPS, ADL and ambulatory dysfunction  : inpt comprehensive acute rehab, ADL, gait and balancing training, regular neuro checks,  fall precaution, pain and medical management, DVT prophylaxis, dermal defense, f/u with neurosurgeon, medical and radiation oncologist as scheduled.           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)   • Metastatic adenocarcinoma (CMS/HCC)   • Malignant neoplasm metastatic to bone (CMS/HCC)   • Lung cancer (CMS/HCC)   • Encounter for antineoplastic chemotherapy   • Brain mass   • Brain metastases (CMS/HCC)   • Risk for falls   • At high risk for deep venous thrombosis   • At high risk for pressure injury of skin   • Pain   • Hydrocephalus (CMS/HCC)   • Chronic hyponatremia   complicated case with multiple comorbidities as mentioned in subjective section, spent 35 min to manage the case, >50% of the time consulting the patient about current medical condition, existing comorbidities, new  findings/concerns and care/management plan.              Jorge Orellana MD  1/12/2022

## 2022-01-12 NOTE — PROGRESS NOTES
Daily Progress Note    Patient was seen and examined.   Attestation Notes: Face to face encounter completed and patient discussed in team conference    Subjective     Interval History: has no complaint of headache, fever, chills, sweats, abd discomfort, new numbness, tingling or weakness... spoke with nursing and  and there were no overnight issues reported. CS for bed mobility, min A for transfers, min A for ambulation with gait belt x 50ft.  History also provided by:     Objective     Vital signs in last 24 hours:  Temp:  [36.6 °C (97.9 °F)] 36.6 °C (97.9 °F)  Heart Rate:  [55-78] 55  Resp:  [16-18] 18  BP: (123-141)/(70-78) 132/71    No intake or output data in the 24 hours ending 01/12/22 1121  Intake/Output this shift:  No intake/output data recorded.    Review of Systems:  All other systems reviewed and negative except as noted in the HPI.    Labs  No new labs.    Imaging  Not applicable    VTE Assessment: TBD    Full Code    Physical Exam  Physical Exam  Constitutional:       Appearance: Normal appearance. She is well-developed.   HENT:      Head: Normocephalic and atraumatic.     Eyes:      Extraocular Movements:      Right eye: Abnormal extraocular motion present.      Left eye: Abnormal extraocular motion present.      Conjunctiva/sclera: Conjunctivae normal.      Pupils: Pupils are equal, round, and reactive to light.   Cardiovascular:      Rate and Rhythm: Normal rate and regular rhythm.   Pulmonary:      Effort: Pulmonary effort is normal.      Breath sounds: Normal breath sounds.   Abdominal:      General: Bowel sounds are normal.      Palpations: Abdomen is soft.       Genitourinary:     Comments: No hi catheter  Musculoskeletal:         General: Normal range of motion.      Comments: No jt erythema, warmth or effusion   Skin:     General: Skin is warm and dry.   Neurological:      Mental Status: She is alert and oriented to person, place, and time.      Cranial Nerves: No cranial nerve deficit.       Sensory: No sensory deficit.      Motor: Weakness present. No tremor, abnormal muscle tone or seizure activity.      Coordination: Coordination abnormal.      Gait: Gait abnormal.      Comments: Fluent, naming and repetition intact, follows commands, facial muscle symmetric, tongue midline on protrusion, sensation to light touch intact, testing reveals 4/5 strength throughout left side, 5/5 strength right side, coordination L side noted on ROSS, HTS, no fix or drift, tone 0/4, no clonus.    Psychiatric:         Mood and Affect: Mood is depressed.         Speech: Speech normal.         Behavior: Behavior normal.             Plan of care was discussed with patient    Assessment & Plan  Chronic hyponatremia  Assessment & Plan  Monitor bmp    Hydrocephalus (CMS/HCC)  Assessment & Plan  S/p R VPS De.ta 1.0 (placed on 1/6/2022)    Pain  Assessment & Plan  Tylenol, oxycodone prn    At high risk for pressure injury of skin  Assessment & Plan  Turns q2hr    At high risk for deep venous thrombosis  Assessment & Plan  SC heparin    Risk for falls  Assessment & Plan  Wean off restraints as able    Lung cancer (CMS/HCC)  Assessment & Plan  Holding home Keytruda, can restart in 2 weeks  -Medical Oncologist: Dr. Wang Batista ([facility])  -Radiation Oncologist: Dr. Mckinney    * Brain metastases (CMS/HCC)  Assessment & Plan  Ms. Mast-year-old female with history metastatic lung adenocarcinoma Dx 2/2021 status post L suboccipital craniotomy for brain met resection on 11/22/2021 who presented to [facility] on 1/4/2022 with persistent headache and worsening gait stability.  CT head postop course complicated by hypotension and MRI brain showed increase in pseudomeningocele and findings consistent with hydrocephalus compared to prior imaging.  Dr. Best neurosurgery performed right  shunt placement on 1/6/2022 (Delta 1.0).  Postop she was started on Decadron taper.  Her acute stay was complicated by hypertension which she received IV  labetalol.  At time of transfer blood pressure was stable on losartan.  She was cleared to initiate heparin for DVT prophylaxis.  Chronic hyponatremia stable at time of transfer with sodium 133.  She was ultimately determined to be medically stable for transfer to [facility] on 1/10/2022 for an acute inpatient rehabilitation program to address deficits related to metastatic lung adenocarcinoma and hydrocephalus status post  shunt.    She is weightbearing as tolerated.  She will participate in 3 hours of physical therapy, Occupational Therapy, and speech therapy as well as evaluated by subsequent 4-hour rehab nursing care.  Throughout the follow-up EGD and fourth weeks prior to her appointment with Dr. Best from neurosurgery.        Expected Discharge Date:  1/20/2022  Home with home care

## 2022-01-12 NOTE — PLAN OF CARE
Plan of Care Review  Plan of Care Reviewed With: patient  Progress: improving  Outcome Summary: AAOx3, flat affect this evening. Refusing bladder scans but is voiding in the toilet. C/O pain in her head- using PRN tylenol and oxy for relief. Resting in bed with the call bell within reach.

## 2022-01-12 NOTE — NURSING NOTE
Void 200 cc dark yellow urine, patient refusing PVR, txt page sent to Dr. Esteban brewster significant PVR's since admission and pt's refusal of st cath. Spoke with pt's  about urinary retension, he is requesting patient ambulate without gait belt and sit unattended in BR to assist her in urinating and for her to feel a sense of autonomy. We discussed shifting the focus of autonomy onto success in therapy vs removing fall prevention measures. He is in agreement and will discuss this with the patient.

## 2022-01-12 NOTE — PLAN OF CARE
Problem: Rehabilitation (IRF) Plan of Care  Goal: Plan of Care Review  Flowsheets (Taken 1/12/2022 2289)  Progress: improving  Plan of Care Reviewed With: (Demarcus and Guardian Saul Advocate  yasmani) spouse  Outcome Summary: team this date. Pt progressing in therapies and ELOS projected for 1/20 with supervision goals. Update given to  and nurse advocate as patient had only agreed to be here a few days.  is coming tonight at 4 and has elicited the oncologist Dr Batista to call patient in room at that time.   He wants Dr Batista to tell patient to stay here, she listens to him.   and Niece Jose will be here on sunday for training in case patient refuses to stay.  but  supports our ELOS.   CM alerted team to 's request as he knows patient the best.  CM to follow up with  in the morning to see how it went    Support offered  Questions answered - Radha ELIZABETH

## 2022-01-12 NOTE — PROGRESS NOTES
Patient: Lisa Mast  Location[facility] [address]  MRN: [id number]  Today's date: 1/12/2022    History of Present Illness  Lisa is a 66 y.o. female admitted on 1/10/2022 with Brain metastases (CMS/HCC) [C79.31]. Principal problem is Brain metastases (CMS/HCC).    66 yy.o. 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, radiation pneumonitis, esophageal stricture, and heart murmur presents for POV s/p SOC for resection of metastasis on 11/22/2021 presents with persistent headaches and worsening gait instability found to have hydrocephalus, pseudomeningocele, and interval increase of bleeding into resection cavity and is now s/p RF VPS, Delta 1.0 (1/6)     Neurosurgery note 1/10:   #Left cerebellar brain mass s/p SOC (11/22/2021) c/b hydrocephalus, pseudomeningocele, resection cavity hemorrhage now s/p RF VPS, Delta 1.0 (1/6)    #Post op Vision changes  -Post op (11/22): double / blurry vison, left eye ptosis, left eye nystagmus  -Seen by Ophthalmology last admission, scheduled for outpatient follow up in February    Past Medical History  Lisa has a past medical history of Hypertension, Lung cancer (CMS/HCC), and Lung mass.      OT Vitals    Date/Time Pulse HR Source BP BP Location BP Method Pt Position Observations [facility]   01/12/22 1043 55 Monitor 132/71 Left upper arm Automatic Lying Report of dizzyness    01/12/22 1118 71 Monitor 129/62 Left upper arm Automatic Sitting continues to c/o dizziness LG      OT Pain    Date/Time Pain Type Location Rating: Rest Interventions [facility]   01/12/22 1118 Post Activity head 4 other (see comments) quiet break provided, free from external distraction LG          Prior Living Environment      Most Recent Value   People in Home spouse   Current Living Arrangements home   Living Environment Comment [facility]. Pt resides on 1st floor   Number of Stairs, Main Entrance 1   Stair Railings, Main Entrance none   Stairs Comment, Main Entrance Reports   would assist PTA   Location, Patient Bedroom first (main) floor   Location, Bathroom first (main) floor   Bathroom Access Comment Walk in shower with small threshold. Has shower chair and grab bars. Grab bars by toilet          Prior Level of Function      Most Recent Value   Dominant Hand right   Ambulation assistive person   Transferring assistive person   Toileting assistive person   Bathing independent   Dressing independent  [ setup clothing]   Prior Level of Function Comment CGA/Taye from spouse. Pt lives with  in [place], enjoys taking walks and playing tennis,  Pt is health  and works full time,  Pt shares responsibility of managing household finances and scheduling calendars.  They own property in [place] which pt helps to manage   Assistive Device Currently Used at Home grab bar, shower chair, other (see comments)  [transport chair]          Occupational Profile      Most Recent Value   Successful Occupations Works as . +   Occupational History/Life Experiences Enjoys playing tennis, rides horses   Environmental Supports and Barriers Supportive    Patient Goals Improve my balance, vision           IRF OT Evaluation and Treatment - 01/12/22 1006        OT Time Calculation    Start Time 1000     Stop Time 1130     Time Calculation (min) 90 min        Session Details    Document Type daily treatment/progress note     Mode of Treatment occupational therapy;individual therapy        General Information    Patient Profile Reviewed yes     General Observations of Patient Pleasant, cooperative. Care for this patient also provided by Lucila Bowman MS, OTR/L        Vision Assessment/Intervention    Visual Impairment/Limitations corrective lenses for reading     OT Adult Perceptual Screening Test (OT-APST) Mild L eye ptosis. ROM: WFL, Choppy smooth pursuits L eye. Saccades: slowed movement L eye horizontally. Convergence: L eye difficulty  with fixation noted when target ~3 inches away. R eye WFL. Intermediate acuity: 20/50 with report of blurryness on that line impacting score (read each number as 8). Near acuity: 20/32 with reading glassess donned. Pt unsure of strength of reading glasses, not labeled on side     Motor Free Visual Perception Test (MVPT) Vision Packet: LARGE LETTER H CANCEL: good scanning pattern, 100% accuracy. FLOWER Copy: WFL. MUSIC NOTE CANCELLATION: Good scanning pattern noted, L to R, top to bottom. All notes found. LINE BISECTION: Grossly WFL. TELEPHONE NUMBER COPY (saccades): 60% accuracy, increase time. Report of blurryness.     Impact of Vision Impairment on Function Pt engaged in x2 small DLM peg design copy with emphasis on visual scanning/saccades to replicate moderately complex designs. Min cues for accuracy with noted missing detail in upper L quadrant        Hand  Strength Testing    Left Hand, Setting 2 45, 45, 50     Right Hand, Setting 2 55, 52, 55     Left Hand: Tip (Pincer) Pinch Strength 4     Left Hand: Lateral (Key) Pinch Strength 10     Left Hand: Three Point (Carlos) Pinch Strength 7     Right Hand: Tip (Pincer) Pinch Strength 7     Right Hand: Lateral (Key) Pinch Strength 8     Right Hand: Three Point (Carlos) Pinch Strength 4        Bed Mobility    [place], Supine to Sit close supervision     [place], Sit to Supine close supervision     Assistive Device none     Comment (Bed Mobility) On treatment mat        Sit to Stand Transfer    [place], Sit to Stand Transfer minimum assist (75% or more patient effort)     Verbal Cues safety     Assistive Device gait belt        Stand to Sit Transfer    [place], Stand to Sit Transfer minimum assist (75% or more patient effort)     Verbal Cues safety     Assistive Device gait belt        Safety Issues, Functional Mobility    Comment, Safety Issues/Impairments (Mobility) OT: min A c gait belt short distance amb in gym. cues for pacing        Motor  Skills    Coordination 9 Hole Peg Test of Fine Motor Coordination Results     Results, 9 Hole Peg Test of Fine Motor Coordination BOX AND BLOCKS: 37 blocks/1 min R hand; 36 blocks/1 min L hand. 9 HOLE PE.4s right hand; 34.2s left hand     Comment, Motor Skills Good use of tweezers with R hand to remove small DLM pegs from board x mass reps        Therapeutic Interventions    Comment, Therapeutic Intervention Benefitted from supine rest break in quiet, dim lighted environment for head pain, eye pain, and dizzyness (VS stable). Cool washcloth placed over eyes/forehead        Daily Progress Summary (OT)    Daily Outcome Statement Session limited by headache, dizzyness (VS stable). Benefitted from cold washcloth on forehead and supine rest break as well as quiet/dim lighted room. /pinch and FMC screens grossly WFL. Visual screen identified poor L eye fixation, impaired horizontal saccades, choppy smooth pursuits. Would benefit from visual exercises within headache pain tolerance. Continue POC.                           IRF OT Goals      Most Recent Value   Transfer Goal 1    Activity/Assistive Device toilet at 2022   [place] supervision required  [CS] at 2022   Time Frame short-term goal (STG), 5 - 7 days at 2022   Transfer Goal 2    Activity/Assistive Device toilet at 2022   [place] modified independence at 2022   Time Frame long-term goal (LTG), 21 days or less at 2022   Transfer Goal 3    Activity/Assistive Device shower at 2022   [place] supervision required  [CS] at 2022   Time Frame short-term goal (STG), 5 - 7 days at 2022   Transfer Goal 4    Activity/Assistive Device shower at 2022   [place] supervision required at 2022   Time Frame long-term goal (LTG), 21 days or less at 2022   Bathing Goal 1    Activity/Assistive Device bathing skills, all at  01/11/2022 0721   [place] supervision required  [CS] at 01/11/2022 0721   Time Frame short-term goal (STG), 5 - 7 days at 01/11/2022 0721   Bathing Goal 2    Activity/Assistive Device bathing skills, all at 01/11/2022 0721   [place] supervision required at 01/11/2022 0721   Time Frame long-term goal (LTG), 21 days or less at 01/11/2022 0721   UB Dressing Goal 1    Activity/Assistive Device upper body dressing at 01/11/2022 0721   [place] supervision required  [CS with item retrieval] at 01/11/2022 0721   Time Frame short-term goal (STG), 5 - 7 days at 01/11/2022 0721   UB Dressing Goal 2    Activity/Assistive Device upper body dressing at 01/11/2022 0721   [place] modified independence at 01/11/2022 0721   Time Frame long-term goal (LTG), 21 days or less at 01/11/2022 0721   LB Dressing Goal 1    Activity/Assistive Device lower body dressing at 01/11/2022 0721   [place] supervision required  [CS] at 01/11/2022 0721   Time Frame short-term goal (STG), 5 - 7 days at 01/11/2022 0721   LB Dressing Goal 2    Activity/Assistive Device lower body dressing at 01/11/2022 0721   [place] modified independence at 01/11/2022 0721   Time Frame long-term goal (LTG), 21 days or less at 01/11/2022 0721   Grooming Goal 1    Activity/Assistive Device grooming skills, all at 01/11/2022 0721   [place] supervision required  [CS in stance] at 01/11/2022 0721   Time Frame short-term goal (STG), 5 - 7 days at 01/11/2022 0721   Grooming Goal 2    Activity/Assistive Device grooming skills, all at 01/11/2022 0721   [place] modified independence at 01/11/2022 0721   Time Frame long-term goal (LTG), 21 days or less at 01/11/2022 0721   Toileting Goal 1    Activity/Assistive Device toileting skills, all at 01/11/2022 0721   [place] supervision required  [CS] at 01/11/2022 0721   Time Frame short-term goal (STG), 5 - 7 days at 01/11/2022 0721   Toileting Goal 2    Activity/Assistive Device toileting  skills, all at 01/11/2022 0721   [place] modified independence at 01/11/2022 0721   Time Frame long-term goal (LTG), 21 days or less at 01/11/2022 0721
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
## 2022-01-12 NOTE — PLAN OF CARE
Problem: Rehabilitation (IRF) Plan of Care  Goal: Plan of Care Review  Outcome: Progressing  Flowsheets (Taken 1/12/2022 0193)  Progress: improving  Plan of Care Reviewed With: patient  Outcome Summary: Patient is able to make needs known. Patient with continent void but refuses PVRs. Patient denies pain/discomfort. Patient slept during the night.

## 2022-01-13 ENCOUNTER — APPOINTMENT (INPATIENT)
Dept: SPEECH THERAPY | Facility: REHABILITATION | Age: 67
DRG: 949 | End: 2022-01-13
Payer: COMMERCIAL

## 2022-01-13 PROBLEM — A41.9 SEPSIS (CMS/HCC): Status: ACTIVE | Noted: 2022-01-13

## 2022-01-13 PROBLEM — R33.9 URINARY RETENTION: Status: ACTIVE | Noted: 2022-01-13

## 2022-01-13 LAB
GLUCOSE BLD-MCNC: 119 MG/DL (ref 70–99)
GLUCOSE BLD-MCNC: 134 MG/DL (ref 70–99)
GLUCOSE BLD-MCNC: 87 MG/DL (ref 70–99)
POCT TEST: ABNORMAL
POCT TEST: ABNORMAL
POCT TEST: NORMAL

## 2022-01-13 PROCEDURE — 92507 TX SP LANG VOICE COMM INDIV: CPT | Mod: GN

## 2022-01-13 PROCEDURE — 12800001 HC ROOM AND CARE SEMIPRIVATE REHAB-BRAIN INJ

## 2022-01-13 PROCEDURE — 25800000 HC PHARMACY IV SOLUTIONS: Performed by: INTERNAL MEDICINE

## 2022-01-13 PROCEDURE — 63600000 HC DRUGS/DETAIL CODE: Performed by: INTERNAL MEDICINE

## 2022-01-13 PROCEDURE — 63700000 HC SELF-ADMINISTRABLE DRUG: Performed by: PHYSICAL MEDICINE & REHABILITATION

## 2022-01-13 PROCEDURE — 63700000 HC SELF-ADMINISTRABLE DRUG: Performed by: INTERNAL MEDICINE

## 2022-01-13 PROCEDURE — 63600000 HC DRUGS/DETAIL CODE: Performed by: PHYSICAL MEDICINE & REHABILITATION

## 2022-01-13 RX ORDER — OXYCODONE HYDROCHLORIDE 5 MG/1
5 TABLET ORAL EVERY 6 HOURS PRN
Status: DISCONTINUED | OUTPATIENT
Start: 2022-01-13 | End: 2022-01-13

## 2022-01-13 RX ORDER — DEXAMETHASONE 4 MG/1
4 TABLET ORAL EVERY 8 HOURS
Status: DISCONTINUED | OUTPATIENT
Start: 2022-01-17 | End: 2022-01-17 | Stop reason: HOSPADM

## 2022-01-13 RX ORDER — SODIUM CHLORIDE 9 MG/ML
1000 INJECTION, SOLUTION INTRAVENOUS
Status: DISCONTINUED | OUTPATIENT
Start: 2022-01-13 | End: 2022-01-13

## 2022-01-13 RX ORDER — DEXAMETHASONE 4 MG/1
4 TABLET ORAL EVERY 6 HOURS
Status: DISCONTINUED | OUTPATIENT
Start: 2022-01-13 | End: 2022-01-13

## 2022-01-13 RX ORDER — SODIUM CHLORIDE 9 MG/ML
INJECTION, SOLUTION INTRAVENOUS CONTINUOUS
Status: DISCONTINUED | OUTPATIENT
Start: 2022-01-13 | End: 2022-01-14

## 2022-01-13 RX ORDER — DEXAMETHASONE 4 MG/1
4 TABLET ORAL EVERY 6 HOURS
Status: DISCONTINUED | OUTPATIENT
Start: 2022-01-13 | End: 2022-01-17 | Stop reason: HOSPADM

## 2022-01-13 RX ORDER — DEXAMETHASONE 2 MG/1
2 TABLET ORAL DAILY
Status: DISCONTINUED | OUTPATIENT
Start: 2022-01-29 | End: 2022-01-17 | Stop reason: HOSPADM

## 2022-01-13 RX ORDER — DEXAMETHASONE 2 MG/1
2 TABLET ORAL EVERY 12 HOURS
Status: DISCONTINUED | OUTPATIENT
Start: 2022-01-25 | End: 2022-01-17 | Stop reason: HOSPADM

## 2022-01-13 RX ORDER — LOPERAMIDE HYDROCHLORIDE 2 MG/1
2 CAPSULE ORAL AS NEEDED
Status: DISCONTINUED | OUTPATIENT
Start: 2022-01-13 | End: 2022-01-17 | Stop reason: HOSPADM

## 2022-01-13 RX ORDER — DEXAMETHASONE 4 MG/1
4 TABLET ORAL EVERY 12 HOURS
Status: DISCONTINUED | OUTPATIENT
Start: 2022-01-21 | End: 2022-01-17 | Stop reason: HOSPADM

## 2022-01-13 RX ORDER — SODIUM CHLORIDE 9 MG/ML
INJECTION, SOLUTION INTRAVENOUS CONTINUOUS
Status: DISCONTINUED | OUTPATIENT
Start: 2022-01-13 | End: 2022-01-13

## 2022-01-13 RX ORDER — LIDOCAINE HYDROCHLORIDE 20 MG/ML
JELLY TOPICAL AS NEEDED
Status: DISCONTINUED | OUTPATIENT
Start: 2022-01-13 | End: 2022-01-17 | Stop reason: HOSPADM

## 2022-01-13 RX ORDER — ACETAMINOPHEN 650 MG/20.3ML
650 LIQUID ORAL EVERY 4 HOURS PRN
Status: DISCONTINUED | OUTPATIENT
Start: 2022-01-13 | End: 2022-01-17 | Stop reason: HOSPADM

## 2022-01-13 RX ORDER — DOXYCYCLINE HYCLATE 100 MG
100 TABLET ORAL EVERY 12 HOURS
Status: DISCONTINUED | OUTPATIENT
Start: 2022-01-13 | End: 2022-01-17 | Stop reason: HOSPADM

## 2022-01-13 RX ORDER — OXYCODONE HYDROCHLORIDE 5 MG/1
7.5 TABLET ORAL EVERY 6 HOURS PRN
Status: DISCONTINUED | OUTPATIENT
Start: 2022-01-13 | End: 2022-01-17 | Stop reason: HOSPADM

## 2022-01-13 RX ADMIN — SODIUM CHLORIDE: 9 INJECTION, SOLUTION INTRAVENOUS at 09:45

## 2022-01-13 RX ADMIN — PANTOPRAZOLE SODIUM 40 MG: 40 TABLET, DELAYED RELEASE ORAL at 07:53

## 2022-01-13 RX ADMIN — DOXYCYCLINE HYCLATE 100 MG: 100 TABLET, COATED ORAL at 20:28

## 2022-01-13 RX ADMIN — OXYCODONE HYDROCHLORIDE 7.5 MG: 5 TABLET ORAL at 14:09

## 2022-01-13 RX ADMIN — DEXAMETHASONE 4 MG: 4 TABLET ORAL at 18:03

## 2022-01-13 RX ADMIN — ACETAMINOPHEN 650 MG: 325 TABLET, FILM COATED ORAL at 11:44

## 2022-01-13 RX ADMIN — OXYCODONE HYDROCHLORIDE 7.5 MG: 5 TABLET ORAL at 20:28

## 2022-01-13 RX ADMIN — ACETAMINOPHEN 650 MG: 325 TABLET, FILM COATED ORAL at 18:04

## 2022-01-13 RX ADMIN — HEPARIN SODIUM 5000 UNITS: 5000 INJECTION, SOLUTION INTRAVENOUS; SUBCUTANEOUS at 22:46

## 2022-01-13 RX ADMIN — DOXYCYCLINE HYCLATE 100 MG: 100 TABLET, COATED ORAL at 10:10

## 2022-01-13 RX ADMIN — OXYCODONE HYDROCHLORIDE 5 MG: 5 TABLET ORAL at 07:54

## 2022-01-13 RX ADMIN — DEXAMETHASONE 2 MG: 2 TABLET ORAL at 11:32

## 2022-01-13 RX ADMIN — DEXAMETHASONE 4 MG: 4 TABLET ORAL at 23:01

## 2022-01-13 RX ADMIN — HEPARIN SODIUM 5000 UNITS: 5000 INJECTION, SOLUTION INTRAVENOUS; SUBCUTANEOUS at 14:09

## 2022-01-13 RX ADMIN — DEXAMETHASONE 2 MG: 2 TABLET ORAL at 07:53

## 2022-01-13 RX ADMIN — CHLORHEXIDINE GLUCONATE 0.12% ORAL RINSE 15 ML: 1.2 LIQUID ORAL at 07:55

## 2022-01-13 RX ADMIN — CHLORHEXIDINE GLUCONATE 0.12% ORAL RINSE 15 ML: 1.2 LIQUID ORAL at 20:28

## 2022-01-13 RX ADMIN — HEPARIN SODIUM 5000 UNITS: 5000 INJECTION, SOLUTION INTRAVENOUS; SUBCUTANEOUS at 06:42

## 2022-01-13 RX ADMIN — ACETAMINOPHEN 650 MG: 325 TABLET, FILM COATED ORAL at 07:53

## 2022-01-13 RX ADMIN — CEFTRIAXONE SODIUM 1 G: 1 INJECTION, POWDER, FOR SOLUTION INTRAMUSCULAR; INTRAVENOUS at 16:31

## 2022-01-13 NOTE — NURSING NOTE
"Unable to obtain blood draw, Dr. Orellana aware, IV Rocephin initiated, no adverse reactions noted, continues with lethargy, reports adequate pain relief with Oxycodone 7.5 mg. IV infusing without difficulty. Void in Br, seated offcenter of \"hat\" and unable to measure output however  clear yellow urine.  "

## 2022-01-13 NOTE — NURSING NOTE
Talked with pt again about need for straight cath. Pt agreeable to try one last time. Nursing supervisor offered to try and was able to straight cath pt for 800 mL of clear jose/yellow urine. Pt tolerated straight cath well.

## 2022-01-13 NOTE — PLAN OF CARE
Plan of Care Review  Plan of Care Reviewed With: patient  Progress: improving  Outcome Summary: C/o headache, given PRN pain meds with good relief. Pt in urinary retention, denies feeling the urge to void. Tolerated straight cath for 800mL. Continent BM. Sleeping well, bed alarm on, call bell within reach. Srx4 maintained for safety

## 2022-01-13 NOTE — PROGRESS NOTES
Patient: Lisa Mast  Location: [facility] [address]  MRN: [id number]  Today's date: 1/13/2022    Attempted to see patient for therapy. Unable due to patient refused (OT asking for medical hold from DR). Pt missing 30 min of OT. extreme fatigue, low BP, getting IV fluids, running lab workup.  contacted for medical hold however uncertain if order was entered yet.

## 2022-01-13 NOTE — CONSULTS
Infectious Disease Consult Note    Patient Name: Lisa Mast  MR#: [id number]  : 1955  Admission Date: 1/10/2022  Consult Date: 22 4:22 PM   Consultant: José Padilla MD    Reason for Consult: sepsis, likely from urinary retention with UTI, s/p VPS, brain mets h/o resection  Referring Provider: Jorge Orellana      History of Present Illness     Lisa Mast is a 66 y.o. female with PMH of lung ca s/p resection who was admitted on 1/10/2022 with headache and underwent VPS on . She is currently lethargic and unable to provide much history but reports headache and cough. She remains afebrile without leukocytosis now.    Outside records were reviewed.    Allergies:   Allergies   Allergen Reactions   • Codeine      Other reaction(s): Unknown  Headaches         Medical History:   Past Medical History:   Diagnosis Date   • Hypertension    • Lung cancer (CMS/HCC)    • Lung mass        Surgical History:   Past Surgical History:   Procedure Laterality Date   • APPENDECTOMY     • BRAIN SURGERY      cerebellar mass resection   • CATARACT EXTRACTION, BILATERAL         Social History:   Social History     Socioeconomic History   • Marital status:      Spouse name: None   • Number of children: None   • Years of education: None   • Highest education level: None   Occupational History   • Occupation: sells health insurance   Tobacco Use   • Smoking status: Former Smoker   • Smokeless tobacco: Never Used   Substance and Sexual Activity   • Alcohol use: Yes   • Drug use: None   • Sexual activity: None   Other Topics Concern   • None   Social History Narrative    Ms. Mast currently lives with her .        Hobbies: tennis, walking     Social Determinants of Health     Financial Resource Strain: Not on file   Food Insecurity: No Food Insecurity   • Worried About Running Out of Food in the Last Year: Never true   • Ran Out of Food in the Last Year: Never true   Transportation Needs: Not on file  "  Physical Activity: Not on file   Stress: Not on file   Social Connections: Not on file   Intimate Partner Violence: Not on file   Housing Stability: Not on file          Travel Exposure:   Travel and Exposure Screening      Most Recent Value   Travel Screening    Overnight hospitalization outside the [place] in the last year? No Filed On: 01/10/2022 1744   Exposure Screening    Symptoms           Family History: No family history on file.    Review of Systems    Review of systems not obtained due to patient factors.    Medications:    Current IP Meds (From admission, onward)        Frequency     dexAMETHasone (DECADRON) tablet 2 mg        \"Followed by\" Linked Group Details    Daily     dexAMETHasone (DECADRON) tablet 2 mg        \"Followed by\" Linked Group Details    Every 12 hours     dexAMETHasone (DECADRON) tablet 4 mg        \"Followed by\" Linked Group Details    Every 12 hours     dexAMETHasone (DECADRON) tablet 2 mg  Status:  Discontinued        \"Followed by\" Linked Group Details    Daily     dexAMETHasone (DECADRON) tablet 4 mg        \"Followed by\" Linked Group Details    Every 8 hours     dexAMETHasone (DECADRON) tablet 2 mg  Status:  Discontinued        \"Followed by\" Linked Group Details    2 times daily     sodium chloride 0.9 % infusion 1,000 mL  Status:  Discontinued         Daily (7p)     dexAMETHasone (DECADRON) tablet 4 mg  Status:  Discontinued         Every 6 hours     dexAMETHasone (DECADRON) tablet 4 mg  Status:  Discontinued         Every 6 hours     dexAMETHasone (DECADRON) tablet 4 mg        \"Followed by\" Linked Group Details    Every 6 hours     cefTRIAXone (ROCEPHIN) 1 g in sodium chloride 0.9 % 100 mL IVPB        Note to Pharmacy: Administer after collecting blood sample for BCX and urine for UA    Every 24 hours interval     oxyCODONE (ROXICODONE) immediate release tablet 5 mg  Status:  Discontinued         Every 6 hours PRN     oxyCODONE (ROXICODONE) immediate release tablet 7.5 mg         " "Every 6 hours PRN     lidocaine HCL (URO-JET) 2 % jelly         As needed     sodium chloride 0.9 % infusion         Continuous     doxycycline hyclate (VIBRA-TABS) tablet 100 mg         Every 12 hours     loperamide (IMODIUM) capsule 2 mg         As needed     cefTRIAXone (ROCEPHIN) 1 g in sodium chloride 0.9 % 100 mL IVPB  Status:  Discontinued        Note to Pharmacy: Administer after collecting blood sample for BCX and urine for UA    Every 24 hours interval     sodium chloride 0.9 % infusion  Status:  Discontinued         Continuous     terazosin (HYTRIN) capsule 2 mg  Status:  Discontinued         Nightly     losartan (COZAAR) tablet 50 mg         Daily with dinner     dexAMETHasone (DECADRON) tablet 2 mg  Status:  Discontinued        \"Followed by\" Linked Group Details    4 times daily     insulin aspart U-100 (NovoLOG) pen 1-10 Units         3 times daily with meals     calcium carbonate (TUMS) chewable tablet 500 mg         3 times daily PRN     pantoprazole (PROTONIX) tablet,delayed release (DR/EC) 40 mg         Daily     senna (SENOKOT) tablet 2 tablet         Daily     docusate sodium (COLACE) capsule 100 mg         2 times daily     heparin (porcine) 5,000 unit/mL injection 5,000 Units         Every 8 hours     chlorhexidine (PERIDEX) 0.12 % mouthwash 15 mL         2 times daily     sodium chloride (OCEAN) 0.65 % nasal spray 2 spray         2 times daily PRN     ondansetron ODT (ZOFRAN-ODT) disintegrating tablet 4 mg         Every 8 hours PRN     oxyCODONE (ROXICODONE) immediate release tablet 10 mg  Status:  Discontinued         Every 6 hours PRN     oxyCODONE (ROXICODONE) immediate release tablet 5 mg  Status:  Discontinued         Every 6 hours PRN     acetaminophen (TYLENOL) tablet 650 mg         Every 4 hours PRN          Anti-infectives (From admission, onward)    Start     Dose/Rate Route Frequency Ordered Stop    01/13/22 1708  cefTRIAXone (ROCEPHIN) 1 g in sodium chloride 0.9 % 100 mL IVPB      " "  Note to Pharmacy: Administer after collecting blood sample for BCX and urine for UA    1 g  200 mL/hr over 30 Minutes intravenous Every 24 hours interval 22 0923 01/15/22 1714    22 0930  doxycycline hyclate (VIBRA-TABS) tablet 100 mg         100 mg oral Every 12 hours 22 0821 22 0859            Objective     Vital Signs:    Patient Vitals for the past 72 hrs:   BP Temp Temp src Pulse Resp SpO2 Height Weight   22 1510 (!) 86/47 36.4 °C (97.6 °F) Oral 82 16 97 % -- --   22 1136 (!) 93/55 -- -- 71 -- -- -- --   22 1100 (!) 67/46 -- -- 80 18 96 % -- --   22 0700 (!) 71/50 36.2 °C (97.2 °F) Oral (!) 119 20 97 % -- --   22 0656 (!) 84/62 -- -- 83 18 97 % -- --   22 1937 (!) 146/74 36.6 °C (97.9 °F) Oral 80 18 98 % -- --   22 1628 -- 36.7 °C (98.1 °F) -- -- -- -- -- --   22 1624 (!) 126/52 -- -- 60 18 -- -- --   22 1327 123/66 -- -- 70 -- -- -- --   22 1305 110/70 -- -- 75 -- -- -- --   22 1118 129/62 -- -- 71 -- -- -- --   22 1043 132/71 -- -- (!) 55 -- -- -- --   22 0959 124/72 -- -- 67 -- -- -- --   22 0700 (!) 141/78 36.6 °C (97.9 °F) Oral 69 18 97 % -- --   22 1942 129/70 -- -- 75 16 98 % -- --   22 1609 123/77 -- -- 78 16 96 % -- --   22 1101 133/60 -- -- 81 -- -- -- --   22 1037 (!) 131/59 -- -- 97 -- -- -- --   22 0825 133/70 -- -- 66 -- 97 % -- --   22 0732 (!) 155/85 -- -- 67 -- 96 % -- --   22 0701 (!) 155/85 36.6 °C (97.8 °F) Oral 67 18 96 % -- --   01/10/22 1927 (!) 159/80 36.2 °C (97.2 °F) Oral 82 16 96 % -- --   01/10/22 1725 (!) 176/91 36.4 °C (97.5 °F) Oral (!) 54 16 96 % 1.753 m (5' 9\") 55.2 kg (121 lb 11.2 oz)       Temp (72hrs), Av.5 °C (97.7 °F), Min:36.2 °C (97.2 °F), Max:36.7 °C (98.1 °F)      Physical Exam:    General appearance: lethargic  Head: normocephalic, without obvious abnormality, atraumatic  Eyes: anicteric sclera  Neck: supple  Lungs: " diminished breath sounds bilateral  Heart: regular rate and rhythm  Abdomen: soft, non-tender  Extremities: edema none  Joints: no swelling  Skin: no rashes  Neurologic: following commands    Lines, Drains, Airways, Wounds:  Peripheral IV (Adult) 01/13/22 Posterior;Right Hand (Active)   Number of days: 0       Surgical Incision Head Posterior;Right;Upper (Active)   Number of days: 3       Surgical Incision Umbilicus (Active)   Number of days: 3       Surgical Incision Head Lower;Posterior;Right (Active)   Number of days: 3       Labs:    CBC Results       01/11/22 02/09/21     0612 1509    WBC 7.86 8.32    RBC 4.26 3.88    HGB 12.5 10.2    HCT 37.4 32.3    MCV 87.8 83.2    MCH 29.3 26.3    MCHC 33.4 31.6     373      BMP Results       01/11/22 01/10/22 02/09/21     0612  1509     133 138    K 4.2 4.6 4.1    Cl 94 95 102    CO2 29 30 26    Glucose 117 121 117    BUN 15 18 7    Creatinine 0.4 0.49 0.7    Calcium 9.5 -- 9.6    Anion Gap 10 -- 10    EGFR >60.0 -- >60.0      PT/PTT Results    No lab values to display.     UA Results       01/11/22     1542    Color Yellow    Clarity Clear    Glucose Negative    Bilirubin Negative    Ketones Negative    Sp Grav 1.020    Blood Negative    Ph 7.0    Protein Negative    Urobilinogen 0.2    Nitrite Negative    Leuk Est Negative         Comment for Blood at 1542 on 01/11/22: The sensitivity of the occult blood test is equivalent to approximately 4 intact RBC/HPF.    Comment for Leuk Est at 1542 on 01/11/22: Results can be falsely negative due to high specific gravity, some antibiotics, glucose >3 g/dl, or WBC other than neutrophils.      Lactate Results    No lab values to display.         Microbiology Results     ** No results found for the last 720 hours. **          Pathology Results     ** No results found for the last 720 hours. **          Echo:     Cardiac Imaging    ECHOCARDIOGRAM STRESS TEST 05/30/2018    Interpretation Summary  · Stress ECG does not meet  criteria for ischemia.  · Stress echocardiogram does not meet criteria for ischemia.  · Normal cavity size. Normal wall thickness. Preserved systolic function. Estimated EF = 65- 70%. No regional wall motion abnormalities.  · Mild tricuspid valve regurgitation. Est PASP 25-30 mm Hg.      Imaging:    Radiology Imaging    XR CHEST 2 VW    Narrative  CLINICAL HISTORY: Lingering cough, recent sinus infection    COMPARISON: None available.    COMMENT: Two views of the chest are obtained..    Lines and tubes: None.  Lungs/pleura: No focal consolidation. No pulmonary edema, pleural effusion or  pneumothorax.  Cardiomediastinal silhouette: Tortuous aorta with trace atherosclerotic  calcification.  Upper abdomen: Within normal limits.  Bones: No acute osseous abnormality.  Multilevel degenerative changes of the  imaged spine    --    Impression  No acute cardiopulmonary disease.      I certify that I have reviewed this examination and agree with this report.  Alpesh King, DO      Assessment     1. Encephalopathy - possibly metabolic vs infectious from pneumonia with cough being reported. Recent UA was unremarkable and pt remains afebrile without leukocytosis.         Plan     1. Continue ceftriaxone while waiting for repeat UA and if still unremarkable, check CXR to r/o pneumonia.

## 2022-01-13 NOTE — SUBJECTIVE & OBJECTIVE
Patient was seen and examined.   Attestation Notes: Face to face encounter completed    Subjective     Interval History: has no complaint of headache, fever, chills, sweats, abd discomfort, new numbness, tingling or weakness... spoke with nursing and  and overnight nursing issues include urinary retention.  Pt refusing to be straight cathed or have floley placed despite very high PVRs.  Eventually she acgreed to straight cath and 800cc drained.  She is noted to be hypotensive adn tachycardic this am.  Has had poor po so starting IV fluids.  Will also check u/a.  afebrile, Pulse ox wnl.. CS for bed mobility, min A for transfers, min A for ambulation with gait belt x 150ft.  History also provided by:     Objective     Vital signs in last 24 hours:  Temp:  [36.6 °C (97.9 °F)-36.7 °C (98.1 °F)] 36.6 °C (97.9 °F)  Heart Rate:  [] 119  Resp:  [18-20] 20  BP: ()/(50-74) 71/50      Intake/Output Summary (Last 24 hours) at 1/13/2022 0818  Last data filed at 1/13/2022 0000  Gross per 24 hour   Intake --   Output 801 ml   Net -801 ml     Intake/Output this shift:  No intake/output data recorded.    Review of Systems:  All other systems reviewed and negative except as noted in the HPI.    Labs  No new labs.    Imaging  Not applicable    VTE Assessment: TBD    Full Code    Physical Exam  Physical Exam  Constitutional:       Appearance: Normal appearance. She is well-developed.   HENT:      Head: Normocephalic and atraumatic.     Eyes:      Extraocular Movements:      Right eye: Abnormal extraocular motion present.      Left eye: Abnormal extraocular motion present.      Conjunctiva/sclera: Conjunctivae normal.      Pupils: Pupils are equal, round, and reactive to light.   Cardiovascular:      Rate and Rhythm: Normal rate and regular rhythm.   Pulmonary:      Effort: Pulmonary effort is normal.      Breath sounds: Normal breath sounds.   Abdominal:      General: Bowel sounds are normal.      Palpations: Abdomen is  soft.       Genitourinary:     Comments: No hi catheter  Musculoskeletal:         General: Normal range of motion.      Comments: No jt erythema, warmth or effusion   Skin:     General: Skin is warm and dry.   Neurological:      Mental Status: She is alert and oriented to person, place, and time.      Cranial Nerves: No cranial nerve deficit.      Sensory: No sensory deficit.      Motor: Weakness present. No tremor, abnormal muscle tone or seizure activity.      Coordination: Coordination abnormal.      Gait: Gait abnormal.      Comments: Fluent, naming and repetition intact, follows commands, facial muscle symmetric, tongue midline on protrusion, sensation to light touch intact, testing reveals 4/5 strength throughout left side, 5/5 strength right side, coordination L side noted on ROSS, HTS, no fix or drift, tone 0/4, no clonus.    Psychiatric:         Mood and Affect: Mood is depressed.         Speech: Speech normal.         Behavior: Behavior normal.             Plan of care was discussed with patient

## 2022-01-13 NOTE — PROGRESS NOTES
Called to patient's room due to urinary retention which has been ongoing this evening. She was recommended to go to the ER earlier by day team due to urinary retention and inability to straight cath. Nursing supervisor was also called who came to the patient's room.    On first arrival to the room, the patient wanted to try to urinate and did urinate about 100 ml of yellow urine. She has no discomfort in her abdomen at rest or with walking. She is very upset that we tried to straight cath her and called it torture and I explained how we feel it was necessary and apologized. I discussed the risks of bladder overdistension including kidney injury, permanent bladder damage, and death if her bladder were to rupture. She was able to repeat this back to me and understands our reasoning for straight cath and referral to the ED. She still refuses for either of these things to occur right now as she feels mentally unwell from prior straight cath attempts and wants her rest. I told her we will need to continue to bladder scan and likely will need to try straight cath vs hi in the near future. Discussed with nursing and nurse supervisor.     Aamir Rothman MD

## 2022-01-13 NOTE — PROGRESS NOTES
Patient: Lisa Mast  Location: [facility] [address]  MRN: [id number]  Today's date: 1/13/2022    History of Present Illness  Lisa is a 66 y.o. female admitted on 1/10/2022 with Brain metastases (CMS/HCC) [C79.31]. Principal problem is Brain metastases (CMS/HCC).    66 yy.o. 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, radiation pneumonitis, esophageal stricture, and heart murmur presents for POV s/p SOC for resection of metastasis on 11/22/2021 presents with persistent headaches and worsening gait instability found to have hydrocephalus, pseudomeningocele, and interval increase of bleeding into resection cavity and is now s/p RF VPS, Delta 1.0 (1/6)     Neurosurgery note 1/10:   #Left cerebellar brain mass s/p SOC (11/22/2021) c/b hydrocephalus, pseudomeningocele, resection cavity hemorrhage now s/p RF VPS, Delta 1.0 (1/6)    #Post op Vision changes  -Post op (11/22): double / blurry vison, left eye ptosis, left eye nystagmus  -Seen by Ophthalmology last admission, scheduled for outpatient follow up in February    Past Medical History  Lisa has a past medical history of Hypertension, Lung cancer (CMS/HCC), and Lung mass.      SLP Vitals    Date/Time Pulse HR Source Resp SpO2 Pt Activity O2 Therapy BP BP Location BP Method Pt Position [facility]   01/13/22 1100 80 Monitor 18 96 % At rest None (Room air) 67/46 Left upper arm Automatic Lying ADC      SLP Pain    Date/Time Location Rating: Rest [facility]   01/13/22 1106 throat 8 KM   01/13/22 1125 head 8 KM          Prior Living Environment      Most Recent Value   People in Home spouse   Current Living Arrangements home   Living Environment Comment [facility]. Pt resides on 1st floor   Number of Stairs, Main Entrance 1   Stair Railings, Main Entrance none   Stairs Comment, Main Entrance Reports  would assist PTA   Location, Patient Bedroom first (main) floor   Location, Bathroom first (main) floor   Bathroom Access Comment Walk in shower  with small threshold. Has shower chair and grab bars. Grab bars by toilet          Prior Level of Function      Most Recent Value   Dominant Hand right   Ambulation assistive person   Transferring assistive person   Toileting assistive person   Bathing independent   Dressing independent  [ setup clothing]   Prior Level of Function Comment CGA/Taye from spouse. Pt lives with  in [place], enjoys taking walks and playing tennis,  Pt is health  and works full time,  Pt shares responsibility of managing household finances and scheduling calendars.  They own property in [place] which pt helps to manage   Assistive Device Currently Used at Home grab bar, shower chair, other (see comments)  [transport chair]           IRF SLP Evaluation and Treatment - 01/13/22 1108        SLP Time Calculation    Start Time 1100     Stop Time 1130     Time Calculation (min) 30 min        Session Details    Document Type daily treatment/progress note     Mode of Treatment individual therapy;speech language pathology        General Information    Patient Profile Reviewed yes     General Observations of Patient pt resting in bed, nursing aware of level of pain, cooperative, fatigue        Cognition/Psychosocial    Comment, Attention pt's headache and throat pain limited responses, however pt participated entire session     Executive Function Deficit (Cognition) information processing;problem-solving/reasoning     Comment, Executive Function Functional problem solving-10/10 with min cues for additional solutions.        Orientation Log    [facility] 3-->spontaneous/free recall     Kind of Place 3-->spontaneous/free recall     Name of Hospital 3-->spontaneous/free recall     Month 3-->spontaneous/free recall     Date 3-->spontaneous/free recall     Year 3-->spontaneous/free recall     Day of Week 3-->spontaneous/free recall     Clock Time 3-->spontaneous/free recall     Etiology/Event 3-->spontaneous/free recall      Pathology Deficits 3-->spontaneous/free recall     Total Score 30        Daily Progress Summary (SLP)    Daily Outcome Statement Pt participated entire session with rest breaks as needed. Completed basic verbal problem solving questions with min cues. Continue to target dysphagia, attention, dysarthria, problem solving.     Symptoms Noted During/After Treatment none                      IRF SLP Goals      Most Recent Value   Motor Speech/Voice Goal 1    Motor Speech/Voice Goal 1 use strategies for articulatory precision in simple to mod level speech tasks x 80% mod cues at 01/11/2022 0915   Time Frame short-term goal (STG), 1 week at 01/11/2022 0915   Motor Speech/Voice Goal 2    Motor Speech/Voice Goal 2 use strategies for articulatory precision in mod to high level speech tasks x 90% min cues at 01/11/2022 0915   Time Frame long-term goal (LTG), 3 weeks at 01/11/2022 0915   Oral Nutrition/Hydration Goal 1    Activity effective/safe/independent, oral nutrition/hydration, use of swallowing techniques  [tolerate regular and thins with use of small bites/sips strategy x 90% min cues] at 01/11/2022 0915   Time Frame short-term goal (STG), 1 week at 01/11/2022 0915   Oral Nutrition/Hydration Goal 2    Activity effective/safe/independent  [use swallow strategies x 100%Carmela] at 01/11/2022 0915   Time Frame long-term goal (LTG), 3 weeks at 01/11/2022 0915   Attention Goal 1    Activity maintain focused/sustained attention, perform selective attention tasks, perform alternating attention tasks, for 10 minutes  [mod level tasks] at 01/11/2022 0915   [place]/Cues with moderate, verbal cues/redirection at 01/11/2022 0915   Time Frame short-term goal (STG), 1 week at 01/11/2022 0915   Attention Goal 2    Activity maintain focused/sustained attention, perform selective attention tasks, perform alternating attention tasks, for 20-30 minutes at 01/11/2022 0915   [place]/Cues independently at 01/11/2022 0915   Time Frame  long-term goal (LTG), 3 weeks at 01/11/2022 0915   Executive Function Goal 1    Activity abstract thinking tasks, insight/awareness of deficits, organization/sequencing tasks, planning/decision-making tasks, problem-solving/reasoning tasks  [mod level] at 01/11/2022 0915   [place]/Accuracy with 80% accuracy, with moderate, verbal cues/redirection at 01/11/2022 0915   Time Frame short-term goal (STG), 1 week at 01/11/2022 0915   Executive Function Goal 2    Activity abstract thinking tasks, insight/awareness of deficits, organization/sequencing tasks, planning/decision-making tasks, problem-solving/reasoning tasks at 01/11/2022 0915   [place]/Accuracy with 90% accuracy, with minimum, verbal cues/redirection at 01/11/2022 0915   Time Frame long-term goal (LTG), 3 weeks at 01/11/2022 0915   Memory Goal 1    Activity short-term memory tasks, working memory tasks, prospective memory tasks, recall recent events  [mod level] at 01/11/2022 0915   [place]/Accuracy with 80% accuracy, moderate cues for use of strategies at 01/11/2022 0915   Memory Goal 2    Activity short-term memory tasks, working memory tasks, prospective memory tasks, recall recent events at 01/11/2022 0915   [place]/Accuracy with 90% accuracy, minimal cues for use of strategies at 01/11/2022 0915   Time Frame long-term goal (LTG), 3 weeks at 01/11/2022 0915

## 2022-01-13 NOTE — PROGRESS NOTES
Patient: Lisa Mast  Location: [facility] [address]  MRN: [id number]  Today's date: 1/13/2022    Attempted to see patient for therapy. Unable due to medical hold. Patient scheduled for PT 2527-1030. Unable to see patient as she is with nursing for IV fluids, labs d/t hypotension, tachycardia, and urinary retention. Contacted MD re: medical hold for AM and partial medical hold ordered by Dr. Alamo. Will resume skilled PT when appropriate.

## 2022-01-13 NOTE — PROGRESS NOTES
[facility] Internal Medicine Progress Note          Patient was seen and examined at bedside.    Subjective:  Urinary retention, refusing straight cath, over flow urine leakage, this am tachycardia and low BP, dehydration with loose BM, not feeling well, highly suspect urosepsis, check UA hold for UCX, check BCX, CBC, and lactic acid, provide IVF NS hydration and empiric Rocephin with doxycyline until micro data available.         Objective   Vital signs in last 24 hours:  Temp:  [36.2 °C (97.2 °F)-36.7 °C (98.1 °F)] 36.2 °C (97.2 °F)  Heart Rate:  [] 119  Resp:  [18-20] 20  BP: ()/(50-74) 71/50      Intake/Output Summary (Last 24 hours) at 1/13/2022 0931  Last data filed at 1/13/2022 0000  Gross per 24 hour   Intake --   Output 801 ml   Net -801 ml     Intake/Output this shift:  No intake/output data recorded.   Review of Systems:  All other systems reviewed and negative except as noted in the HPI.   Objective      Labs  reviewed her labs thoroughly   Lab Results   Component Value Date    WBC 7.86 01/11/2022    HGB 12.5 01/11/2022    HCT 37.4 01/11/2022    MCV 87.8 01/11/2022     01/11/2022     Lab Results   Component Value Date    GLUCOSE 117 (H) 01/11/2022    CALCIUM 9.5 01/11/2022     (L) 01/11/2022    K 4.2 01/11/2022    CO2 29 01/11/2022    CL 94 (L) 01/11/2022    BUN 15 01/11/2022    CREATININE 0.4 (L) 01/11/2022       Imaging  OSH imaging study reports reviewed       Full Code    Physical Exam:  Head/Ear/Nose/Throat: scalp incisional site clean, no discharge or bleeding, focal c/d/i; moisture mouth mm, no oropharyngeal thrush noted.   Eyes: anicteric sclera, EOMI; PERRL.   Neck : supple, no JVD, no carotid bruits appeciated.   Respiratory: no evidence of labored breathing, lung sounds CTA b/l, good aeration bibasilar area, no w/r/c.   Cardiovascular: RRR; normal S1, S2; no m/r/g; no S3 or S4.   Gastrointestinal: soft; NT; BS normal; mildly distended; no CVAT b/l.    Genitourinary: no hi.   Extremities : no c/c/e .   Neurological: AO x 3, fluent speeches, following commands, CNS II-XII grossly intact; no focal neurologic deficits.   Behavior/Emotional: in NAD, appropriate; cooperative.   Skin: clean, dry and intact.     Plan of care was discussed with patient, RN, and PMR attending     Assessment   CC:Metastic brain tumor form lung adenocarcinoma, s/p resection, complicated with hydrocephalus and pseudomeningocele, associated with persistent headaches and worsening gait instability, s/p an elective RF VPS, ADL and ambulatory dysfunction.      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 Her post op course was not complicated. She has urinary retention, but has been refusing cic per RN at [facility], she stated she can urinate by herself, her BM is fine, no confusion or significant agitation, her VS is fine, no new neurologic deficits. Functionally, she has ADL and ambul;atory dysfunction, requiring inpt acute rehab, transferred to [facility] on 1/10/22.    #Left cerebellar brain mass s/p SOC (11/22/2021) c/b hydrocephalus, pseudomeningocele, resection cavity hemorrhage now s/p RF VPS, Delta 1.0 (1/6)  -regular Neuro Checks  -complete Dexamethasone taper course   -inpt acute rehab   -f/u with neurosurgeon, Rad-Onc, and Med-Onc as planned     #Pain Mangement  -regular pain scale evaluation  -Tylenol PRN, Oxycodone PRN, Flexeril PRN  -opioids precaution.     #Lung cancer (CMS-HCC)  -Holding home Keytruda, can restart in 2 weeks  -F/u with Medical Oncologist:   Wang Batista ([facility])  -F/u with Radiation Oncologist: Dr. Mckinney, recently seen by Dr. Mills ([facility])     #Hyponatremia  -not surprising for intracranial tumor and surgery, c/w SIADH with urine osm 317 / Urine Na 86 / Serum Osm 274 / Serum Na 133 (1/5)  -Fluid restriction, salt tablets, Trend NA     #Post op Vision changes  -Post op (11/22): double / blurry vison, left eye ptosis, left eye nystagmus  -Seen by Ophthalmology last admission, scheduled for outpatient follow up in February     #Hypertension  -Continue home antihypertensives with adjustment   -monitor her BP, with orthostatic hypotension precaution.      #Esophagitis  -Continue home Lansoprazole , elevate HOB, diet modification, f/u with ENT     #Prophylaxis  -Per protocol     #Metastic brain tumor form lung adenocarcinoma, s/p resection, complicated with hydrocephalus and pseudomeningocele, associated with persistent headaches and worsening gait instability, s/p an elective RF VPS, ADL and ambulatory dysfunction  : inpt comprehensive acute rehab, ADL, gait and balancing training, regular neuro checks,  fall precaution, pain and medical management, DVT prophylaxis, dermal defense, f/u with neurosurgeon, medical and radiation oncologist as scheduled.           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)   • Metastatic adenocarcinoma (CMS/HCC)   • Malignant neoplasm metastatic to bone (CMS/HCC)   • Lung cancer (CMS/HCC)   • Encounter for antineoplastic chemotherapy   • Brain mass   • Brain metastases (CMS/HCC)   • Risk for falls   • At high risk for deep venous thrombosis   • At high risk for pressure injury of skin   • Pain   • Hydrocephalus (CMS/HCC)   • Chronic hyponatremia   • Urinary retention   • Sepsis (CMS/HCC)   complicated case with multiple comorbidities as mentioned in subjective section, spent 35 min to manage the case, >50% of the time  consulting the patient about current medical condition, existing comorbidities, new findings/concerns and care/management plan.              Jorge Orellana MD  1/13/2022

## 2022-01-13 NOTE — ASSESSMENT & PLAN NOTE
Ms. Mast-year-old female with history metastatic lung adenocarcinoma Dx 2/2021 status post L suboccipital craniotomy for brain met resection on 11/22/2021 who presented to [facility] on 1/4/2022 with persistent headache and worsening gait stability.  CT head postop course complicated by hypotension and MRI brain showed increase in pseudomeningocele and findings consistent with hydrocephalus compared to prior imaging.  Dr. Best neurosurgery performed right  shunt placement on 1/6/2022 (Delta 1.0).  Postop she was started on Decadron taper.  Her acute stay was complicated by hypertension which she received IV labetalol.  At time of transfer blood pressure was stable on losartan.  She was cleared to initiate heparin for DVT prophylaxis.  Chronic hyponatremia stable at time of transfer with sodium 133.  She was ultimately determined to be medically stable for transfer to [facility] on 1/10/2022 for an acute inpatient rehabilitation program to address deficits related to metastatic lung adenocarcinoma and hydrocephalus status post  shunt.    She is weightbearing as tolerated.  She will participate in 3 hours of physical therapy, Occupational Therapy, and speech therapy as well as evaluated by subsequent 4-hour rehab nursing care.  Throughout the follow-up EGD and fourth weeks prior to her appointment with Dr. Best from neurosurgery.

## 2022-01-13 NOTE — PROGRESS NOTES
Patient: Lisa Mast  Location: [facility] [address]  MRN: [id number]  Today's date: 1/13/2022    Attempted to see patient for therapy. Unable due to medical hold. Missed scheduled 60 min OT session due to med hold for hypotension, extreme fatigue

## 2022-01-13 NOTE — PROGRESS NOTES
Daily Progress Note    Patient was seen and examined.   Attestation Notes: Face to face encounter completed    Subjective     Interval History: has no complaint of headache, fever, chills, sweats, abd discomfort, new numbness, tingling or weakness... spoke with nursing and  and overnight nursing issues include urinary retention.  Pt refusing to be straight cathed or have floley placed despite very high PVRs.  Eventually she acgreed to straight cath and 800cc drained.  She is noted to be hypotensive adn tachycardic this am.  Has had poor po so starting IV fluids.  Will also check u/a.  afebrile, Pulse ox wnl.. CS for bed mobility, min A for transfers, min A for ambulation with gait belt x 150ft.  History also provided by:     Objective     Vital signs in last 24 hours:  Temp:  [36.6 °C (97.9 °F)-36.7 °C (98.1 °F)] 36.6 °C (97.9 °F)  Heart Rate:  [] 119  Resp:  [18-20] 20  BP: ()/(50-74) 71/50      Intake/Output Summary (Last 24 hours) at 1/13/2022 0818  Last data filed at 1/13/2022 0000  Gross per 24 hour   Intake --   Output 801 ml   Net -801 ml     Intake/Output this shift:  No intake/output data recorded.    Review of Systems:  All other systems reviewed and negative except as noted in the HPI.    Labs  No new labs.    Imaging  Not applicable    VTE Assessment: TBD    Full Code    Physical Exam  Physical Exam  Constitutional:       Appearance: Normal appearance. She is well-developed.   HENT:      Head: Normocephalic and atraumatic.     Eyes:      Extraocular Movements:      Right eye: Abnormal extraocular motion present.      Left eye: Abnormal extraocular motion present.      Conjunctiva/sclera: Conjunctivae normal.      Pupils: Pupils are equal, round, and reactive to light.   Cardiovascular:      Rate and Rhythm: Normal rate and regular rhythm.   Pulmonary:      Effort: Pulmonary effort is normal.      Breath sounds: Normal breath sounds.   Abdominal:      General: Bowel sounds are normal.       Palpations: Abdomen is soft.       Genitourinary:     Comments: No hi catheter  Musculoskeletal:         General: Normal range of motion.      Comments: No jt erythema, warmth or effusion   Skin:     General: Skin is warm and dry.   Neurological:      Mental Status: She is alert and oriented to person, place, and time.      Cranial Nerves: No cranial nerve deficit.      Sensory: No sensory deficit.      Motor: Weakness present. No tremor, abnormal muscle tone or seizure activity.      Coordination: Coordination abnormal.      Gait: Gait abnormal.      Comments: Fluent, naming and repetition intact, follows commands, facial muscle symmetric, tongue midline on protrusion, sensation to light touch intact, testing reveals 4/5 strength throughout left side, 5/5 strength right side, coordination L side noted on ROSS, HTS, no fix or drift, tone 0/4, no clonus.    Psychiatric:         Mood and Affect: Mood is depressed.         Speech: Speech normal.         Behavior: Behavior normal.             Plan of care was discussed with patient    Assessment & Plan  Urinary retention  Assessment & Plan  Stopped hytrin secondary to hypotension.  Continue to monitor PVR, cath prn  Checking u/a  treating empirically for UTI with CTX and doxycycline.  F/u culture results    Chronic hyponatremia  Assessment & Plan  Monitor bmp    Hydrocephalus (CMS/HCC)  Assessment & Plan  S/p R VPS De.ta 1.0 (placed on 1/6/2022)    Pain  Assessment & Plan  Tylenol, oxycodone prn    At high risk for pressure injury of skin  Assessment & Plan  Turns q2hr    At high risk for deep venous thrombosis  Assessment & Plan  SC heparin    Risk for falls  Assessment & Plan  Wean off restraints as able    Lung cancer (CMS/HCC)  Assessment & Plan  Holding home Keytruda, can restart in 2 weeks  -Medical Oncologist: Dr. Wang Batista ([facility])  -Radiation Oncologist: Dr. Mckinney    * Brain metastases (CMS/[facility])  Assessment & Plan  Ms. Mast-year-old female with  history metastatic lung adenocarcinoma Dx 2/2021 status post L suboccipital craniotomy for brain met resection on 11/22/2021 who presented to [facility] on 1/4/2022 with persistent headache and worsening gait stability.  CT head postop course complicated by hypotension and MRI brain showed increase in pseudomeningocele and findings consistent with hydrocephalus compared to prior imaging.  Dr. Best neurosurgery performed right  shunt placement on 1/6/2022 (Delta 1.0).  Postop she was started on Decadron taper.  Her acute stay was complicated by hypertension which she received IV labetalol.  At time of transfer blood pressure was stable on losartan.  She was cleared to initiate heparin for DVT prophylaxis.  Chronic hyponatremia stable at time of transfer with sodium 133.  She was ultimately determined to be medically stable for transfer to [facility] on 1/10/2022 for an acute inpatient rehabilitation program to address deficits related to metastatic lung adenocarcinoma and hydrocephalus status post  shunt.    She is weightbearing as tolerated.  She will participate in 3 hours of physical therapy, Occupational Therapy, and speech therapy as well as evaluated by subsequent 4-hour rehab nursing care.  Throughout the follow-up EGD and fourth weeks prior to her appointment with Dr. Best from neurosurgery.        Expected Discharge Date:  1/20/2022

## 2022-01-13 NOTE — NURSING NOTE
Spoke with night samantha Patel, and nursing supervisor about Dr. Livingston's request for ED evaluation r/t urinary retention. Night relief came up and spoke to pt, who was not agreeable to being sent out to ED. Pt told about risks of urinary retention and need for frequent toileting. Plan is to continue frequent toileting and continue to monitor PVRs. Will reassess throughout the night.

## 2022-01-13 NOTE — PLAN OF CARE
Problem: Rehabilitation (IRF) Plan of Care  Goal: Plan of Care Review  Flowsheets (Taken 1/13/2022 1019)  Progress: improving  Plan of Care Reviewed With: (Demarcus) spouse  Outcome Summary: call from  this am following his visit with patient last night and following Dr Batista speaking to patient. Patient will not stay past Sunday. She will not take team nor physicians' recommendation to stay for acute rehab.    and niece will be in for Family training on the 16th and CM alerting team that might be the case.   Pt continues to struggle with discomfort and CM asked family to consider consulting Palliative for symptom management as patient struggling with pain.   is going to discuss with Dr Batista and his niece and will get back to CM.  Support offered.   Will await return call from  as he does not want that mentioned to patient until discussed with oncologist.  supports referral but wants to discuss.   - Radha ELIZABETH

## 2022-01-13 NOTE — NURSING NOTE
Spoke with Dr. Livingston, indwelling hi offered to patient or evaluation in ER. Patient toiteted in BR again without success and requested a st cath. Attempt made to cath but urethra is poorly visualized and patient is unable to tolerate procedure. Dr. Livingston aware and requesting evaluation in ER.

## 2022-01-13 NOTE — ASSESSMENT & PLAN NOTE
Holding home Keytruda, can restart in 2 weeks  -Medical Oncologist: Dr. Wang Batista ([facility])  -Radiation Oncologist: Dr. Mckinney

## 2022-01-13 NOTE — PLAN OF CARE
Problem: Rehabilitation (IRF) Plan of Care  Goal: Plan of Care Review  1/13/2022 1159 by Radha Wallace LSW  Flowsheets (Taken 1/13/2022 1159)  Progress: improving  Plan of Care Reviewed With: (Khari Garcia)   spouse   other (see comments)  Outcome Summary: call from khari Garcia who reports that she and  would like a call with physician to know what they are dealing with and a conference with team.  They are worried about bringing patient home and having medical issues sending them to ER.  Cm explained again that perhaps a Palliative consult could help with the goals of treatment plan with patient.   They will discuss together but really would like to speak to physicians.  Support offered - Radha ELIZABETH

## 2022-01-13 NOTE — NURSING NOTE
"Asked to obtain labs and initiate IV therapy.  Unable to obtain labs, IV access obtained at 0930.  Will attempt blood draw again after fluids run.  Above communicated to Jj Orellana and Cally.  In speaking with patient she communicates that her pain is not being managed.  She states \"I need my head and throat not to hurt as much.\"  Patient is also reporting that her urinary meatus still hurts from being cathed, and she is not pleased with this. Patient would benefit and is agreeable to palliative care consult with goal of improving pain management.   Discussed this with Bev Wallace, who has discussed this with patient's .  He is agreeable to palliative consult, but at this time, no until patient comes home.  "

## 2022-01-14 ENCOUNTER — APPOINTMENT (INPATIENT)
Dept: OCCUPATIONAL THERAPY | Facility: REHABILITATION | Age: 67
DRG: 949 | End: 2022-01-14
Payer: COMMERCIAL

## 2022-01-14 ENCOUNTER — APPOINTMENT (INPATIENT)
Dept: PHYSICAL THERAPY | Facility: REHABILITATION | Age: 67
DRG: 949 | End: 2022-01-14
Payer: COMMERCIAL

## 2022-01-14 LAB
BASOPHILS # BLD: 0.03 K/UL (ref 0.01–0.1)
BASOPHILS NFR BLD: 0.5 %
BILIRUB UR QL STRIP.AUTO: NEGATIVE MG/DL
CLARITY UR REFRACT.AUTO: CLEAR
COLOR UR AUTO: YELLOW
DIFFERENTIAL METHOD BLD: ABNORMAL
EOSINOPHIL # BLD: 0.03 K/UL (ref 0.04–0.36)
EOSINOPHIL NFR BLD: 0.5 %
ERYTHROCYTE [DISTWIDTH] IN BLOOD BY AUTOMATED COUNT: 18.3 % (ref 11.7–14.4)
GLUCOSE BLD-MCNC: 115 MG/DL (ref 70–99)
GLUCOSE BLD-MCNC: 121 MG/DL (ref 70–99)
GLUCOSE BLD-MCNC: 96 MG/DL (ref 70–99)
GLUCOSE UR STRIP.AUTO-MCNC: NEGATIVE MG/DL
HCT VFR BLDCO AUTO: 39.3 % (ref 35–45)
HGB BLD-MCNC: 12.7 G/DL (ref 11.8–15.7)
HGB UR QL STRIP.AUTO: NEGATIVE
IMM GRANULOCYTES # BLD AUTO: 0.12 K/UL (ref 0–0.08)
IMM GRANULOCYTES NFR BLD AUTO: 2 %
KETONES UR STRIP.AUTO-MCNC: NEGATIVE MG/DL
LACTATE SERPL-SCNC: 2.4 MMOL/L (ref 0.4–2)
LEUKOCYTE ESTERASE UR QL STRIP.AUTO: NEGATIVE
LYMPHOCYTES # BLD: 0.56 K/UL (ref 1.2–3.5)
LYMPHOCYTES NFR BLD: 9.3 %
MCH RBC QN AUTO: 28.9 PG (ref 28–33.2)
MCHC RBC AUTO-ENTMCNC: 32.3 G/DL (ref 32.2–35.5)
MCV RBC AUTO: 89.5 FL (ref 83–98)
MONOCYTES # BLD: 0.45 K/UL (ref 0.28–0.8)
MONOCYTES NFR BLD: 7.4 %
NEUTROPHILS # BLD: 4.86 K/UL (ref 1.7–7)
NEUTS SEG NFR BLD: 80.3 %
NITRITE UR QL STRIP.AUTO: NEGATIVE
NRBC BLD-RTO: 0 %
PDW BLD AUTO: 9.7 FL (ref 9.4–12.3)
PH UR STRIP.AUTO: 6.5 [PH]
PLATELET # BLD AUTO: 271 K/UL (ref 150–369)
POCT TEST: ABNORMAL
POCT TEST: ABNORMAL
POCT TEST: NORMAL
PROT UR QL STRIP.AUTO: NEGATIVE
RBC # BLD AUTO: 4.39 M/UL (ref 3.93–5.22)
SP GR UR REFRACT.AUTO: 1.02
UROBILINOGEN UR STRIP-ACNC: 1 EU/DL
WBC # BLD AUTO: 6.05 K/UL (ref 3.8–10.5)

## 2022-01-14 PROCEDURE — 63700000 HC SELF-ADMINISTRABLE DRUG: Performed by: INTERNAL MEDICINE

## 2022-01-14 PROCEDURE — 63600000 HC DRUGS/DETAIL CODE: Performed by: PHYSICAL MEDICINE & REHABILITATION

## 2022-01-14 PROCEDURE — 81003 URINALYSIS AUTO W/O SCOPE: CPT | Performed by: PHYSICAL MEDICINE & REHABILITATION

## 2022-01-14 PROCEDURE — 87040 BLOOD CULTURE FOR BACTERIA: CPT | Performed by: INTERNAL MEDICINE

## 2022-01-14 PROCEDURE — 92507 TX SP LANG VOICE COMM INDIV: CPT | Mod: GN

## 2022-01-14 PROCEDURE — 97535 SELF CARE MNGMENT TRAINING: CPT | Mod: GO

## 2022-01-14 PROCEDURE — 25800000 HC PHARMACY IV SOLUTIONS: Performed by: INTERNAL MEDICINE

## 2022-01-14 PROCEDURE — 97116 GAIT TRAINING THERAPY: CPT | Mod: GP

## 2022-01-14 PROCEDURE — 85025 COMPLETE CBC W/AUTO DIFF WBC: CPT | Performed by: INTERNAL MEDICINE

## 2022-01-14 PROCEDURE — 63700000 HC SELF-ADMINISTRABLE DRUG: Performed by: PHYSICAL MEDICINE & REHABILITATION

## 2022-01-14 PROCEDURE — 97530 THERAPEUTIC ACTIVITIES: CPT | Mod: GP,59

## 2022-01-14 PROCEDURE — 97530 THERAPEUTIC ACTIVITIES: CPT | Mod: GP

## 2022-01-14 PROCEDURE — 92526 ORAL FUNCTION THERAPY: CPT | Mod: GN

## 2022-01-14 PROCEDURE — 12800001 HC ROOM AND CARE SEMIPRIVATE REHAB-BRAIN INJ

## 2022-01-14 PROCEDURE — 97530 THERAPEUTIC ACTIVITIES: CPT | Mod: GO

## 2022-01-14 PROCEDURE — 83605 ASSAY OF LACTIC ACID: CPT | Performed by: INTERNAL MEDICINE

## 2022-01-14 PROCEDURE — 63600000 HC DRUGS/DETAIL CODE: Performed by: INTERNAL MEDICINE

## 2022-01-14 PROCEDURE — 36415 COLL VENOUS BLD VENIPUNCTURE: CPT | Performed by: INTERNAL MEDICINE

## 2022-01-14 PROCEDURE — 97110 THERAPEUTIC EXERCISES: CPT | Mod: GP

## 2022-01-14 PROCEDURE — 97129 THER IVNTJ 1ST 15 MIN: CPT | Mod: GO

## 2022-01-14 RX ORDER — DEXAMETHASONE 2 MG/1
2 TABLET ORAL EVERY 12 HOURS
Qty: 8 TABLET | Refills: 0 | Status: SHIPPED | OUTPATIENT
Start: 2022-01-25 | End: 2022-01-29

## 2022-01-14 RX ORDER — LOSARTAN POTASSIUM 50 MG/1
50 TABLET ORAL
Qty: 30 TABLET | Refills: 0 | Status: SHIPPED | OUTPATIENT
Start: 2022-01-14 | End: 2022-02-13

## 2022-01-14 RX ORDER — SODIUM CHLORIDE 9 MG/ML
INJECTION, SOLUTION INTRAVENOUS
Status: COMPLETED | OUTPATIENT
Start: 2022-01-14 | End: 2022-01-14

## 2022-01-14 RX ORDER — ENOXAPARIN SODIUM 100 MG/ML
40 INJECTION SUBCUTANEOUS
Qty: 12 ML | Refills: 0 | Status: SHIPPED | OUTPATIENT
Start: 2022-01-15 | End: 2022-02-14

## 2022-01-14 RX ORDER — DEXAMETHASONE 2 MG/1
2 TABLET ORAL DAILY
Qty: 4 TABLET | Refills: 0 | Status: SHIPPED | OUTPATIENT
Start: 2022-01-29 | End: 2022-02-02

## 2022-01-14 RX ORDER — OXYCODONE HYDROCHLORIDE 5 MG/1
7.5 TABLET ORAL EVERY 6 HOURS PRN
Qty: 30 TABLET | Refills: 0 | Status: SHIPPED | OUTPATIENT
Start: 2022-01-14 | End: 2022-01-19

## 2022-01-14 RX ORDER — SENNOSIDES 8.6 MG/1
2 TABLET ORAL DAILY
Qty: 60 TABLET | Refills: 0 | COMMUNITY
Start: 2022-01-15 | End: 2022-02-14

## 2022-01-14 RX ORDER — DOCUSATE SODIUM 100 MG/1
100 CAPSULE, LIQUID FILLED ORAL 2 TIMES DAILY
Qty: 60 CAPSULE | Refills: 0 | COMMUNITY
Start: 2022-01-14 | End: 2022-02-13

## 2022-01-14 RX ORDER — ONDANSETRON 4 MG/1
4 TABLET, ORALLY DISINTEGRATING ORAL EVERY 8 HOURS PRN
Qty: 21 TABLET | Refills: 0 | Status: SHIPPED | OUTPATIENT
Start: 2022-01-14 | End: 2022-01-21

## 2022-01-14 RX ORDER — ACETAMINOPHEN 650 MG/20.3ML
650 LIQUID ORAL EVERY 6 HOURS PRN
COMMUNITY
Start: 2022-01-14 | End: 2022-02-13

## 2022-01-14 RX ORDER — ENOXAPARIN SODIUM 100 MG/ML
40 INJECTION SUBCUTANEOUS
Status: DISCONTINUED | OUTPATIENT
Start: 2022-01-15 | End: 2022-01-17 | Stop reason: HOSPADM

## 2022-01-14 RX ORDER — CALCIUM CARBONATE 200(500)MG
1 TABLET,CHEWABLE ORAL 3 TIMES DAILY PRN
COMMUNITY
Start: 2022-01-14 | End: 2022-02-13

## 2022-01-14 RX ORDER — DEXAMETHASONE 4 MG/1
4 TABLET ORAL EVERY 8 HOURS
Qty: 12 TABLET | Refills: 0 | Status: SHIPPED | OUTPATIENT
Start: 2022-01-17 | End: 2022-01-21

## 2022-01-14 RX ORDER — PANTOPRAZOLE SODIUM 40 MG/1
40 TABLET, DELAYED RELEASE ORAL DAILY
Qty: 30 TABLET | Refills: 0 | Status: SHIPPED | OUTPATIENT
Start: 2022-01-15 | End: 2022-02-14

## 2022-01-14 RX ORDER — DEXAMETHASONE 4 MG/1
4 TABLET ORAL EVERY 12 HOURS
Qty: 8 TABLET | Refills: 0 | Status: SHIPPED | OUTPATIENT
Start: 2022-01-21 | End: 2022-01-25

## 2022-01-14 RX ORDER — DOXYCYCLINE HYCLATE 100 MG
100 TABLET ORAL EVERY 12 HOURS
Qty: 6 TABLET | Refills: 0 | Status: SHIPPED | OUTPATIENT
Start: 2022-01-17 | End: 2022-01-20

## 2022-01-14 RX ADMIN — ACETAMINOPHEN ORAL SOLUTION 650 MG: 650 SOLUTION ORAL at 16:41

## 2022-01-14 RX ADMIN — PANTOPRAZOLE SODIUM 40 MG: 40 TABLET, DELAYED RELEASE ORAL at 08:34

## 2022-01-14 RX ADMIN — DEXAMETHASONE 4 MG: 4 TABLET ORAL at 13:00

## 2022-01-14 RX ADMIN — DOCUSATE SODIUM 100 MG: 100 CAPSULE, LIQUID FILLED ORAL at 21:08

## 2022-01-14 RX ADMIN — DOXYCYCLINE HYCLATE 100 MG: 100 TABLET, COATED ORAL at 21:08

## 2022-01-14 RX ADMIN — HEPARIN SODIUM 5000 UNITS: 5000 INJECTION, SOLUTION INTRAVENOUS; SUBCUTANEOUS at 15:09

## 2022-01-14 RX ADMIN — CEFTRIAXONE SODIUM 1 G: 1 INJECTION, POWDER, FOR SOLUTION INTRAMUSCULAR; INTRAVENOUS at 22:25

## 2022-01-14 RX ADMIN — DOXYCYCLINE HYCLATE 100 MG: 100 TABLET, COATED ORAL at 08:34

## 2022-01-14 RX ADMIN — HEPARIN SODIUM 5000 UNITS: 5000 INJECTION, SOLUTION INTRAVENOUS; SUBCUTANEOUS at 21:08

## 2022-01-14 RX ADMIN — HEPARIN SODIUM 5000 UNITS: 5000 INJECTION, SOLUTION INTRAVENOUS; SUBCUTANEOUS at 06:40

## 2022-01-14 RX ADMIN — CHLORHEXIDINE GLUCONATE 0.12% ORAL RINSE 15 ML: 1.2 LIQUID ORAL at 08:31

## 2022-01-14 RX ADMIN — DEXAMETHASONE 4 MG: 4 TABLET ORAL at 17:34

## 2022-01-14 RX ADMIN — DEXAMETHASONE 4 MG: 4 TABLET ORAL at 06:40

## 2022-01-14 RX ADMIN — SENNOSIDES 2 TABLET: 8.6 TABLET, FILM COATED ORAL at 08:34

## 2022-01-14 RX ADMIN — LOSARTAN POTASSIUM 50 MG: 50 TABLET, FILM COATED ORAL at 17:34

## 2022-01-14 RX ADMIN — ACETAMINOPHEN ORAL SOLUTION 650 MG: 650 SOLUTION ORAL at 01:58

## 2022-01-14 RX ADMIN — SODIUM CHLORIDE: 9 INJECTION, SOLUTION INTRAVENOUS at 22:23

## 2022-01-14 RX ADMIN — ACETAMINOPHEN ORAL SOLUTION 650 MG: 650 SOLUTION ORAL at 08:32

## 2022-01-14 RX ADMIN — CHLORHEXIDINE GLUCONATE 0.12% ORAL RINSE 15 ML: 1.2 LIQUID ORAL at 21:08

## 2022-01-14 NOTE — PROGRESS NOTES
Patient: Lisa Mast  Location: [facility] [address]  MRN: [id number]  Today's date: 1/14/2022    History of Present Illness  Lisa is a 66 y.o. female admitted on 1/10/2022 with Brain metastases (CMS/HCC) [C79.31]. Principal problem is Brain metastases (CMS/HCC).    66 yy.o. 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, radiation pneumonitis, esophageal stricture, and heart murmur presents for POV s/p SOC for resection of metastasis on 11/22/2021 presents with persistent headaches and worsening gait instability found to have hydrocephalus, pseudomeningocele, and interval increase of bleeding into resection cavity and is now s/p RF VPS, Delta 1.0 (1/6)     Neurosurgery note 1/10:   #Left cerebellar brain mass s/p SOC (11/22/2021) c/b hydrocephalus, pseudomeningocele, resection cavity hemorrhage now s/p RF VPS, Delta 1.0 (1/6)    #Post op Vision changes  -Post op (11/22): double / blurry vison, left eye ptosis, left eye nystagmus  -Seen by Ophthalmology last admission, scheduled for outpatient follow up in February    Past Medical History  Lisa has a past medical history of Hypertension, Lung cancer (CMS/HCC), and Lung mass.      OT Vitals    Date/Time Pulse HR Source BP BP Location BP Method Pt Position [facility]   01/14/22 0905 78 Monitor 128/66 Left upper arm Automatic Lying    01/14/22 1000 82 -- 134/68 Left upper arm Automatic Sitting LC      OT Pain    Date/Time Pain Type Location Rating: Rest Rating: Activity Interventions [facility]   01/14/22 0905 Pain Assessment head 6 6 premedicated for activity    01/14/22 0957 Pain Reassessment head 8 8 premedicated for activity    01/14/22 1000 Pain Assessment eye 8 -- quiet environment facilitated LC          Prior Living Environment      Most Recent Value   People in Home spouse   Current Living Arrangements home   Living Environment Comment 2STH. Pt resides on 1st floor   Number of Stairs, Main Entrance 1   Stair Railings, Main  Entrance none   Stairs Comment, Main Entrance Reports  would assist PTA   Location, Patient Bedroom first (main) floor   Location, Bathroom first (main) floor   Bathroom Access Comment Walk in shower with small threshold. Has shower chair and grab bars. Grab bars by toilet          Prior Level of Function      Most Recent Value   Dominant Hand right   Ambulation assistive person   Transferring assistive person   Toileting assistive person   Bathing independent   Dressing independent  [ setup clothing]   Prior Level of Function Comment CGA/Taye from spouse. Pt lives with  in [place], enjoys taking walks and playing tennis,  Pt is health  and works full time,  Pt shares responsibility of managing household finances and scheduling calendars.  They own property in [place] which pt helps to manage   Assistive Device Currently Used at Home grab bar, shower chair, other (see comments)  [transport chair]          Occupational Profile      Most Recent Value   Successful Occupations Works as . +   Occupational History/Life Experiences Enjoys playing tennis, rides horses   Environmental Supports and Barriers Supportive    Patient Goals Improve my balance, vision           IRF OT Evaluation and Treatment - 01/14/22 0902        OT Time Calculation    Start Time 0900     Stop Time 1000     Time Calculation (min) 60 min        Session Details    Document Type daily treatment/progress note     Mode of Treatment occupational therapy;individual therapy        General Information    Patient Profile Reviewed yes     General Observations of Patient Recieved supine in bed; agreeable to ADL        Vision Assessment/Intervention    Impact of Vision Impairment on Function Engaged in occular-motor therex. Pt engaged in x5 of the following: ROM, VOR, and use of andino chart for saccades with slow, but accurate response. Issued andino chart with instructions provided. Plan to  issue handout of occular ROM/VOR therex for HEP        Bed Mobility    [place], Supine to Sit supervision     [place], Sit to Supine supervision        Bed to Chair Transfer    [place], Bed to Chair minimum assist (75% or more patient effort)     Verbal Cues hand placement;safety;technique     Assistive Device wheelchair     Comment amb level        Sit to Stand Transfer    [place], Sit to Stand Transfer touching/steadying assist     Verbal Cues safety     Assistive Device gait belt     Comment in ADL context        Stand to Sit Transfer    [place], Stand to Sit Transfer touching/steadying assist     Verbal Cues safety;technique     Assistive Device gait belt     Comment in ADL context        Toilet Transfer    Transfer Technique stand pivot     [place], Toilet Transfer minimum assist (75% or more patient effort)     Verbal Cues hand placement;safety;technique     Assistive Device gait belt;grab bars/safety frame;raised toilet seat     Comment Amb level        Shower Transfer    Transfer Technique stand pivot     [place], Shower Transfer minimum assist (75% or more patient effort)     Verbal Cues safety     Assistive Device gait belt;grab bars/tub rail;shower chair     Comment Amb level        Safety Issues, Functional Mobility    Comment, Safety Issues/Impairments (Mobility) OT: Min A c gait belt for in room mobility. Postural sway noted with no LOB        Bathing    Self-Performance chest;left arm;right arm;abdomen;front perineal area;buttocks;left upper leg;right upper leg;left lower leg, including foot;right lower leg, including foot     [place] close supervision     Position supported sitting;supported standing     Setup Assistance adaptive equipment setup;adjust water temperature/flow;obtain supplies     Adaptive Equipment grab bar/tub rail;hand-held shower spray hose;shower chair     Comment CS seated level for bathing tasks        Upper Body Dressing    Tasks pull over  garment     Self-Performance obtains clothes;threads left arm, shirt;threads right arm, shirt;pulls shirt over head/around back;pulls shirt down/adjusts     [place] close supervision     Position edge of bed sitting     Adaptive Equipment none     Comment CS standing to reach into drawer for clothing. S seated EOB to олг[place]        Lower Body Dressing    Tasks socks;underwear     Self-Performance obtains clothes;threads left leg, underpants;threads right leg, underpants;pulls underpants up or down;dons/doffs left sock;dons/doffs right sock     [place] Assistance obtains clothes     [place] close supervision     Position edge of bed sitting;unsupported standing     Adaptive Equipment none     [place], Footwear close supervision     Comment CS standing to retrieve item from drawer. Min A to amb to bed. Pt opting to wear nightgown. CS in stance to pull up underwear. Seated for socks, declined wearing shoes        Grooming    Self-Performance washes, rinses and dries face;washes, rinses and dries hands;brushes/wolf hair;oral care (brushing teeth, cleaning dentures)     [place] close supervision     Position unsupported standing     Setup Assistance obtain supplies     Adaptive Equipment none     [place], Oral Hygiene close supervision     Comment Standing        Toileting    [place] close supervision     Position supported sitting;supported standing     Setup Assistance obtain supplies     Adaptive Equipment accessible height toilet;grab bar/safety frame     Comment S seated on toilet. CS in stance for CM. Continent of urine, with nursing notified        Daily Progress Summary (OT)    Daily Outcome Statement Pt seen for AM self-care. Progressing to CS in stance during ADL. Grooming progressed to standing level. Progressed to amb item retrieval with min A for mobility. Initiated occular-motor vision exercises to target visual deficits. Handout to be provided for HEP tomorrow. Continue POC                       Education Documentation  Range of Motion/Motor Control Strategies, taught by Giovanna Hagen OT at 1/14/2022 12:39 PM.  Learner: Patient  Readiness: Acceptance  Method: Explanation  Response: Verbalizes Understanding  Comment: Initiated training on occular motor HEP. handout to be provided in upcoming session tomorrow.          IRF OT Goals      Most Recent Value   Transfer Goal 1    Activity/Assistive Device toilet at 01/11/2022 0721   [place] supervision required  [CS] at 01/11/2022 0721   Time Frame short-term goal (STG), 5 - 7 days at 01/11/2022 0721   Transfer Goal 2    Activity/Assistive Device toilet at 01/11/2022 0721   [place] modified independence at 01/11/2022 0721   Time Frame long-term goal (LTG), 21 days or less at 01/11/2022 0721   Transfer Goal 3    Activity/Assistive Device shower at 01/11/2022 0721   [place] supervision required  [CS] at 01/11/2022 0721   Time Frame short-term goal (STG), 5 - 7 days at 01/11/2022 0721   Transfer Goal 4    Activity/Assistive Device shower at 01/11/2022 0721   [place] supervision required at 01/11/2022 0721   Time Frame long-term goal (LTG), 21 days or less at 01/11/2022 0721   Bathing Goal 1    Activity/Assistive Device bathing skills, all at 01/11/2022 0721   [place] supervision required  [CS] at 01/11/2022 0721   Time Frame short-term goal (STG), 5 - 7 days at 01/11/2022 0721   Bathing Goal 2    Activity/Assistive Device bathing skills, all at 01/11/2022 0721   [place] supervision required at 01/11/2022 0721   Time Frame long-term goal (LTG), 21 days or less at 01/11/2022 0721   UB Dressing Goal 1    Activity/Assistive Device upper body dressing at 01/11/2022 0721   [place] supervision required  [CS with item retrieval] at 01/11/2022 0721   Time Frame short-term goal (STG), 5 - 7 days at 01/11/2022 0721   UB Dressing Goal 2    Activity/Assistive Device upper body dressing at 01/11/2022 0721   [place]  modified independence at 01/11/2022 0721   Time Frame long-term goal (LTG), 21 days or less at 01/11/2022 0721   LB Dressing Goal 1    Activity/Assistive Device lower body dressing at 01/11/2022 0721   [place] supervision required  [CS] at 01/11/2022 0721   Time Frame short-term goal (STG), 5 - 7 days at 01/11/2022 0721   LB Dressing Goal 2    Activity/Assistive Device lower body dressing at 01/11/2022 0721   [place] modified independence at 01/11/2022 0721   Time Frame long-term goal (LTG), 21 days or less at 01/11/2022 0721   Grooming Goal 1    Activity/Assistive Device grooming skills, all at 01/11/2022 0721   [place] supervision required  [CS in stance] at 01/11/2022 0721   Time Frame short-term goal (STG), 5 - 7 days at 01/11/2022 0721   Grooming Goal 2    Activity/Assistive Device grooming skills, all at 01/11/2022 0721   [place] modified independence at 01/11/2022 0721   Time Frame long-term goal (LTG), 21 days or less at 01/11/2022 0721   Toileting Goal 1    Activity/Assistive Device toileting skills, all at 01/11/2022 0721   [place] supervision required  [CS] at 01/11/2022 0721   Time Frame short-term goal (STG), 5 - 7 days at 01/11/2022 0721   Toileting Goal 2    Activity/Assistive Device toileting skills, all at 01/11/2022 0721   [place] modified independence at 01/11/2022 0721   Time Frame long-term goal (LTG), 21 days or less at 01/11/2022 0721

## 2022-01-14 NOTE — PROGRESS NOTES
Patient: Lisa Mast  Location: [facility] [address]  MRN: [id number]  Today's date: 1/14/2022    History of Present Illness  Lisa is a 66 y.o. female admitted on 1/10/2022 with Brain metastases (CMS/HCC) [C79.31]. Principal problem is Brain metastases (CMS/HCC).    66 yy.o. 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, radiation pneumonitis, esophageal stricture, and heart murmur presents for POV s/p SOC for resection of metastasis on 11/22/2021 presents with persistent headaches and worsening gait instability found to have hydrocephalus, pseudomeningocele, and interval increase of bleeding into resection cavity and is now s/p RF VPS, Delta 1.0 (1/6)     Neurosurgery note 1/10:   #Left cerebellar brain mass s/p SOC (11/22/2021) c/b hydrocephalus, pseudomeningocele, resection cavity hemorrhage now s/p RF VPS, Delta 1.0 (1/6)    #Post op Vision changes  -Post op (11/22): double / blurry vison, left eye ptosis, left eye nystagmus  -Seen by Ophthalmology last admission, scheduled for outpatient follow up in February    Past Medical History  Lisa has a past medical history of Hypertension, Lung cancer (CMS/HCC), and Lung mass.      PT Vitals    Date/Time Pulse HR Source BP BP Location BP Method Pt Position [facility]   01/14/22 1431 75 Monitor 153/73 Left upper arm Automatic Sitting [facility]      PT Pain    Date/Time Pain Type Rating: Rest Rating: Activity [facility]   01/14/22 1431 Pain Assessment 0 0 [facility]   01/14/22 1458 Pain Reassessment 0 0 [facility]          Prior Living Environment      Most Recent Value   People in Home spouse   Current Living Arrangements home   Living Environment Comment 2STH. Pt resides on 1st floor   Number of Stairs, Main Entrance 1   Stair Railings, Main Entrance none   Stairs Comment, Main Entrance Reports  would assist PTA   Location, Patient Bedroom first (main) floor   Location, Bathroom first (main) floor   Bathroom Access Comment Walk in shower with small threshold.  Has shower chair and grab bars. Grab bars by toilet          Prior Level of Function      Most Recent Value   Dominant Hand right   Ambulation assistive person   Transferring assistive person   Toileting assistive person   Bathing independent   Dressing independent  [ setup clothing]   Prior Level of Function Comment CGA/Terrence from spouse. Pt lives with  in [place], enjoys taking walks and playing tennis,  Pt is health  and works full time,  Pt shares responsibility of managing household finances and scheduling calendars.  They own property in [place] which pt helps to manage   Assistive Device Currently Used at Home grab bar, shower chair, other (see comments)  [transport chair]           IRF PT Evaluation and Treatment - 01/14/22 1431        PT Time Calculation    Start Time 1430     Stop Time 1500     Time Calculation (min) 30 min        Session Details    Document Type daily treatment/progress note     Mode of Treatment physical therapy;individual therapy        General Information    General Observations of Patient Pt received in pts room asleep in bed. Pt gently woken by PT and agreeable for PT session.        Bed Mobility    [place], Supine to Sit supervision     [place], Sit to Supine supervision     Assistive Device head of bed elevated     Comment (Bed Mobility) performed in pt's bed        Transfers    Transfers stand pivot transfer     Maintains Weight-Bearing Status (Transfers) able to maintain     Comment gait donned throughout full session        Sit to Stand Transfer    [place], Sit to Stand Transfer minimum assist (75% or more patient effort)     Verbal Cues safety     Assistive Device gait belt     Comment from bed, toilet, and w/c        Stand to Sit Transfer    [place], Stand to Sit Transfer minimum assist (75% or more patient effort)     Verbal Cues safety     Assistive Device gait belt     Comment to bed, toilet, and w/c; [place] for eccentric  control        Stand Pivot Transfer    [place], Stand Pivot/Stand Step Transfer minimum assist (75% or more patient effort)     Verbal Cues safety     Assistive Device gait belt     Comment via ambulatory approach with L arm over PT for steadying        Toilet Transfer    Transfer Technique stand pivot     [place], Toilet Transfer minimum assist (75% or more patient effort)     Verbal Cues hand placement;safety;technique     Assistive Device gait belt;grab bars/safety frame     Comment via amb approach; pt able to clean and don/doff pants with [place] for steadying   pt continent of bladder; Nursing notified of toileting d/t fluid restriction protocol       Gait Training    [place], Gait minimum assist (75% or more patient effort)     Assistive Device gait belt     Distance in Feet 200 feet     Pattern (Gait) step-through     Deviations/Abnormal Patterns (Gait) base of support, narrow;weight shifting decreased     Left Sided Gait Deviations heel strike decreased     Right Sided Gait Deviations heel strike decreased     Comment (Gait/Stairs) 200' x 2 with gait belt and L arm over PT; attempted L HHA for 50' 'and pt demonstrated increased postural sway, so regressed to L arm over for PT for safety and [place] for steadying        Balance    Balance Interventions standing     Comment, Balance Standing with R UE on grab bar for Lower body dressing and at sink for hand hygein with touch-[place] 2min; Standing at anterior half wall with B UEs on railin) mini squats 10 x 2 with v/c for foot placement and touchA for safety; 2) B sidestepping 2x to L 2x to R with v/c for keeping toes forward for increased hip abd        Daily Progress Summary (PT)    Symptoms Noted During/After Treatment none     Daily Outcome Statement Pt performed standing therex and ambulation. Attempted L HHA for gait, but regressed to L arm over PT with [place] for steadying d/t increased postural sway, but no LOB noted. Pt c/o light sensitivity,  so sunglasses were donned and lights dimmed, and pt reports feeling more comfortable. Pt would benefit from continued dynamic standing/balance activities.                           IRF PT Goals      Most Recent Value   Bed Mobility Goal 1    Activity/Assistive Device sit to supine/supine to sit, rolling to left, rolling to right at 01/11/2022 1037   [place] supervision required at 01/11/2022 1037   Time Frame short-term goal (STG), 1 week at 01/11/2022 1037   Bed Mobility Goal 2    Activity/Assistive Device sit to supine/supine to sit, rolling to left, rolling to right at 01/11/2022 1037   [place] modified independence at 01/11/2022 1037   Time Frame long-term goal (LTG), 21 days or less at 01/11/2022 1037   Transfer Goal 1    Activity/Assistive Device sit-to-stand/stand-to-sit, stand pivot, no assistive device at 01/11/2022 1037   [place] minimum assist (75% or more patient effort)  [steadying assist] at 01/11/2022 1037   Time Frame short-term goal (STG), 1 week at 01/11/2022 1037   Transfer Goal 2    Activity/Assistive Device sit-to-stand/stand-to-sit, bed-to-chair/chair-to-bed, car transfer, stand pivot, no assistive device at 01/11/2022 1037   [place] supervision required at 01/11/2022 1037   Time Frame long-term goal (LTG), 21 days or less at 01/11/2022 1037   Gait/Walking Locomotion Goal 1    Activity/Assistive Device gait (walking locomotion), no assistive device at 01/11/2022 1037   Distance 100 feet at 01/11/2022 1037   [place] minimum assist (75% or more patient effort) at 01/11/2022 1037   Time Frame short-term goal (STG), 1 week at 01/11/2022 1037   Gait/Walking Locomotion Goal 2    Activity/Assistive Device gait (walking locomotion), no assistive device at 01/11/2022 1037   Distance 200 feet at 01/11/2022 1037   [place] supervision required at 01/11/2022 1037   Time Frame long-term goal (LTG), 21 days or less at 01/11/2022 1037   Stairs Goal 1    Activity/Assistive Device  ascending stairs, descending stairs, using handrail, left, using handrail, right at 01/12/2022 1327   Number of Stairs 8 at 01/12/2022 1327   [place] minimum assist (75% or more patient effort) at 01/12/2022 1327   Time Frame short-term goal (STG), 1 week at 01/12/2022 1327   Stairs Goal 2    Activity/Assistive Device ascending stairs, descending stairs, using handrail, left, using handrail, right at 01/12/2022 1327   Number of Stairs 12 at 01/12/2022 1327   [place] minimum assist (75% or more patient effort)  [steadying assist] at 01/12/2022 1327   Time Frame long-term goal (LTG), 21 days or less at 01/12/2022 1327

## 2022-01-14 NOTE — PROGRESS NOTES
[facility] Internal Medicine Progress Note          Patient was seen and examined at bedside.    Subjective:  1/13/22  Urinary retention, refusing straight cath, over flow urine leakage, this am tachycardia and low BP, dehydration with loose BM, not feeling well, highly suspect urosepsis, check UA hold for UCX, check BCX, CBC, and lactic acid, provide IVF NS hydration and empiric Rocephin with doxycyline until micro data available.       1/14/22  Clinically, her tachycardia and hypotension has resolved, afebrile, she feels much better this am. Of note RN was unable to draw her blood and to collect a urine sample for ordered sepsis workup yesterday before empiric abx had to be given yesterday, so we do not have microbiology data to reference to, but her yesterday's clinical picture c/w sepsis, mostly likely due to urinary retention with UTI, and clinically so far she responded to empiric treatment plus IVF very well . Plan : keep Rocephin for 1 more dose and keep empiric doxycyline for totally 7 days .      Clinically she is stable, she will finish her inpt acute rehab in 2 days, her family plans to bring her home with home visiting RN care on 1/16/22, the team has agreed,  spent 35 min to prepare for her d/c home on 1/16/22 .  Objective   Vital signs in last 24 hours:  Temp:  [36.4 °C (97.6 °F)] 36.4 °C (97.6 °F)  Heart Rate:  [71-87] 78  Resp:  [16-18] 16  BP: ()/(46-70) 128/66      Intake/Output Summary (Last 24 hours) at 1/14/2022 0912  Last data filed at 1/14/2022 0200  Gross per 24 hour   Intake 100 ml   Output 799 ml   Net -699 ml     Intake/Output this shift:  No intake/output data recorded.   Review of Systems:  All other systems reviewed and negative except as noted in the HPI.   Objective      Labs  reviewed her labs thoroughly   Lab Results   Component Value Date    WBC 7.86 01/11/2022    HGB 12.5 01/11/2022    HCT 37.4 01/11/2022    MCV 87.8 01/11/2022     01/11/2022     Lab Results    Component Value Date    GLUCOSE 117 (H) 01/11/2022    CALCIUM 9.5 01/11/2022     (L) 01/11/2022    K 4.2 01/11/2022    CO2 29 01/11/2022    CL 94 (L) 01/11/2022    BUN 15 01/11/2022    CREATININE 0.4 (L) 01/11/2022       Imaging  OSH imaging study reports reviewed       Full Code    Physical Exam:  Head/Ear/Nose/Throat: scalp incisional site clean, no discharge or bleeding, focal c/d/i; moisture mouth mm, no oropharyngeal thrush noted.   Eyes: anicteric sclera, EOMI; PERRL.   Neck : supple, no JVD, no carotid bruits appeciated.   Respiratory: no evidence of labored breathing, lung sounds CTA b/l, good aeration bibasilar area, no w/r/c.   Cardiovascular: RRR; normal S1, S2; no m/r/g; no S3 or S4.   Gastrointestinal: soft; NT; BS normal; mildly distended; no CVAT b/l.   Genitourinary: no hi.   Extremities : no c/c/e .   Neurological: AO x 3, fluent speeches, following commands, CNS II-XII grossly intact; no focal neurologic deficits.   Behavior/Emotional: in NAD, appropriate; cooperative.   Skin: clean, dry and intact.     Plan of care was discussed with patient, RN, and PMR attending     Assessment   CC:Metastic brain tumor form lung adenocarcinoma, s/p resection, complicated with hydrocephalus and pseudomeningocele, associated with persistent headaches and worsening gait instability, s/p an elective RF VPS, ADL and ambulatory dysfunction.      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 Her post op course was not complicated. She has urinary retention, but has been refusing  cic per RN at [facility], she stated she can urinate by herself, her BM is fine, no confusion or significant agitation, her VS is fine, no new neurologic deficits. Functionally, she has ADL and ambul;atory dysfunction, requiring inpt acute rehab, transferred to [facility] on 1/10/22.    #Left cerebellar brain mass s/p SOC (11/22/2021) c/b hydrocephalus, pseudomeningocele, resection cavity hemorrhage now s/p RF VPS, Delta 1.0 (1/6)  -regular Neuro Checks  -complete Dexamethasone taper course   -inpt acute rehab   -f/u with neurosurgeon, Rad-Onc, and Med-Onc as planned     #Pain Mangement  -regular pain scale evaluation  -Tylenol PRN, Oxycodone PRN, Flexeril PRN  -opioids precaution.     #Lung cancer (CMS-HCC)  -Holding home Keytruda, can restart in 2 weeks  -F/u with Medical Oncologist: Dr. Wang Batista ([facility])  -F/u with Radiation Oncologist: Dr. Mckinney, recently seen by Dr. Mills ([facility])     #Hyponatremia  -not surprising for intracranial tumor and surgery, c/w SIADH with urine osm 317 / Urine Na 86 / Serum Osm 274 / Serum Na 133 (1/5)  -Fluid restriction, salt tablets, Trend NA     #Post op Vision changes  -Post op (11/22): double / blurry vison, left eye ptosis, left eye nystagmus  -Seen by Ophthalmology last admission, scheduled for outpatient follow up in February     #Hypertension  -Continue home antihypertensives with adjustment   -monitor her BP, with orthostatic hypotension precaution.      #Esophagitis  -Continue home Lansoprazole , elevate HOB, diet modification, f/u with ENT     #Prophylaxis  -Per protocol     #Metastic brain tumor form lung adenocarcinoma, s/p resection, complicated with hydrocephalus and pseudomeningocele, associated with persistent headaches and worsening gait instability, s/p an elective RF VPS, ADL and ambulatory dysfunction  : inpt comprehensive acute rehab, ADL, gait and balancing training, regular neuro checks,  fall precaution, pain and medical management, DVT prophylaxis, dermal defense, f/u  with neurosurgeon, medical and radiation oncologist as scheduled.           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)   • Metastatic adenocarcinoma (CMS/HCC)   • Malignant neoplasm metastatic to bone (CMS/HCC)   • Lung cancer (CMS/HCC)   • Encounter for antineoplastic chemotherapy   • Brain mass   • Brain metastases (CMS/HCC)   • Risk for falls   • At high risk for deep venous thrombosis   • At high risk for pressure injury of skin   • Pain   • Hydrocephalus (CMS/HCC)   • Chronic hyponatremia   • Urinary retention   • Sepsis (CMS/HCC)   complicated case with multiple comorbidities as mentioned in subjective section, spent 35 min to manage the case, >50% of the time consulting the patient about current medical condition, existing comorbidities, new findings/concerns and care/management plan.              Jorge Orellana MD  1/14/2022

## 2022-01-14 NOTE — PROGRESS NOTES
Patient: Lisa Mast  Location: [facility] [address]  MRN: [id number]  Today's date: 1/14/2022    History of Present Illness  Lisa is a 66 y.o. female admitted on 1/10/2022 with Brain metastases (CMS/HCC) [C79.31]. Principal problem is Brain metastases (CMS/HCC).    66 yy.o. 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, radiation pneumonitis, esophageal stricture, and heart murmur presents for POV s/p SOC for resection of metastasis on 11/22/2021 presents with persistent headaches and worsening gait instability found to have hydrocephalus, pseudomeningocele, and interval increase of bleeding into resection cavity and is now s/p RF VPS, Delta 1.0 (1/6)     Neurosurgery note 1/10:   #Left cerebellar brain mass s/p SOC (11/22/2021) c/b hydrocephalus, pseudomeningocele, resection cavity hemorrhage now s/p RF VPS, Delta 1.0 (1/6)    #Post op Vision changes  -Post op (11/22): double / blurry vison, left eye ptosis, left eye nystagmus  -Seen by Ophthalmology last admission, scheduled for outpatient follow up in February    Past Medical History  Lisa has a past medical history of Hypertension, Lung cancer (CMS/HCC), and Lung mass.      SLP Pain    Date/Time Pain Type Location Rating: Rest Interventions Who   01/14/22 1135 Pain Assessment head 9 diversional activity provided CWB   01/14/22 1155 -- head -- relaxation techniques promoted;quiet environment facilitated CWB          Prior Living Environment      Most Recent Value   People in Home spouse   Current Living Arrangements home   Living Environment Comment [facility]. Pt resides on 1st floor   Number of Stairs, Main Entrance 1   Stair Railings, Main Entrance none   Stairs Comment, Main Entrance Reports  would assist PTA   Location, Patient Bedroom first (main) floor   Location, Bathroom first (main) floor   Bathroom Access Comment Walk in shower with small threshold. Has shower chair and grab bars. Grab bars by toilet           Prior Level of Function      Most Recent Value   Dominant Hand right   Ambulation assistive person   Transferring assistive person   Toileting assistive person   Bathing independent   Dressing independent  [ setup clothing]   Prior Level of Function Comment CGA/Taye from spouse. Pt lives with  in [place], enjoys taking walks and playing tennis,  Pt is health  and works full time,  Pt shares responsibility of managing household finances and scheduling calendars.  They own property in [place] which pt helps to manage   Assistive Device Currently Used at Home grab bar, shower chair, other (see comments)  [transport chair]           IRF SLP Evaluation and Treatment - 01/14/22 1147        SLP Time Calculation    Start Time 1130     Stop Time 1215     Time Calculation (min) 45 min        Session Details    Document Type daily treatment/progress note     Mode of Treatment speech language pathology;individual therapy        General Information    Patient Profile Reviewed yes     General Observations of Patient pt with brighter affect today        Motor Speech    Comment, Motor Speech Intervention practiced reduced rate and precise articulation strategies in counting and rote speech with min-mod cues; pt noted to neutralize vocalic /r/ in faster rate of speech but was able to correct with practice        Food and Liquid Trials (NIS)    Patient Positioning upright in wheelchair     Oral Intake/Feeding Performance independent/appropriate self-feeding skills     Thin Liquids single cup sips     Comment, Thin Liquids 4 oz water     Food Consistencies Evaluated regular     Regular patient controlled amounts     Comment, Regular pop tart x 5 bites     Oral Preparatory Phase of Swallow mastication, slow but effective     Oral Phase of Swallow delayed anterior-posterior transit     Comment, Oral Phase mild delay/incoordinated posterior transfer     Pharyngeal Phase of Swallow delayed swallow reflex  "initiation;decreased laryngeal elevation;multiple swallows per bolus;reports sensation of food stuck in throat   no overt sxs of aspiration observed    Comment, Pharyngeal Phase Pt anxious that she is having increased difficulty swallowing solid foods and reports she is afraid she is losing weight; today, pt c/o sensation of pharyngeal retention with solid food item which she was able to clear with strategy of small bites sips, multiple swallows and liquid wash until sensation eliminated. Reviewed these strategies as well as strategies of adding moist pureed foods and gravies to foods to make them \"slippery\" for easier pharyngeal clearing; reviewed general aspiration and reflux precautions to minimize risk of aspiration in preparation for discharge on 1/16. Recommended pt pursue VFSS as outpatient for objective measure of pharyngeal and espohageal stages of swallow with recommendations to inform home-based ST.  Reviewed all of this information with pt and  (via phone) who asked appropriate questions, and stated understanding of information; Written handout of speech and swallow strategies to be provided later today and reviewed during tomorrow's Speech session     Esophageal Phase of Swallow reported early satiety (feeling full quickly)        Daily Progress Summary (SLP)    Daily Outcome Statement no overt sxs of aspiration with Regular and thins observation with use of small amount, multiple swallows and liquid wash to clear sensation of pharyngeal retention; next session focus on continued education and practice of swallow strategies during full meal observation                 Education Documentation  Eating and Swallowing Adaptations, taught by Viktoria Beebe, CCC-SLP at 1/14/2022  1:42 PM.  Learner: Patient  Readiness: Acceptance  Method: Explanation, Handout  Response: Verbalizes Understanding, Demonstrated Understanding  Comment: educated pt and  via phone re: aspiration and reflux " precautions as well as strategies to reduce sensation of pharyngeal retention    Communication, taught by Viktoria Beebe CCC-SLP at 1/14/2022  1:42 PM.  Learner: Patient  Readiness: Acceptance  Method: Explanation, Handout  Response: Verbalizes Understanding, Demonstrated Understanding  Comment: educated pt and  via phone re: aspiration and reflux precautions as well as strategies to reduce sensation of pharyngeal retention          IRF SLP Goals      Most Recent Value   Motor Speech/Voice Goal 1    Motor Speech/Voice Goal 1 use strategies for articulatory precision in simple to mod level speech tasks x 80% mod cues at 01/11/2022 0915   Time Frame short-term goal (STG), 1 week at 01/11/2022 0915   Motor Speech/Voice Goal 2    Motor Speech/Voice Goal 2 use strategies for articulatory precision in mod to high level speech tasks x 90% min cues at 01/11/2022 0915   Time Frame long-term goal (LTG), 3 weeks at 01/11/2022 0915   Oral Nutrition/Hydration Goal 1    Activity effective/safe/independent, oral nutrition/hydration, use of swallowing techniques  [tolerate regular and thins with use of small bites/sips strategy x 90% min cues] at 01/11/2022 0915   Time Frame short-term goal (STG), 1 week at 01/11/2022 0915   Oral Nutrition/Hydration Goal 2    Activity effective/safe/independent  [use swallow strategies x 100%Carmela] at 01/11/2022 0915   Time Frame long-term goal (LTG), 3 weeks at 01/11/2022 0915   Attention Goal 1    Activity maintain focused/sustained attention, perform selective attention tasks, perform alternating attention tasks, for 10 minutes  [mod level tasks] at 01/11/2022 0915   [place]/Cues with moderate, verbal cues/redirection at 01/11/2022 0915   Time Frame short-term goal (STG), 1 week at 01/11/2022 0915   Attention Goal 2    Activity maintain focused/sustained attention, perform selective attention tasks, perform alternating attention tasks, for 20-30 minutes at 01/11/2022 0915    [place]/Cues independently at 01/11/2022 0915   Time Frame long-term goal (LTG), 3 weeks at 01/11/2022 0915   Executive Function Goal 1    Activity abstract thinking tasks, insight/awareness of deficits, organization/sequencing tasks, planning/decision-making tasks, problem-solving/reasoning tasks  [mod level] at 01/11/2022 0915   [place]/Accuracy with 80% accuracy, with moderate, verbal cues/redirection at 01/11/2022 0915   Time Frame short-term goal (STG), 1 week at 01/11/2022 0915   Executive Function Goal 2    Activity abstract thinking tasks, insight/awareness of deficits, organization/sequencing tasks, planning/decision-making tasks, problem-solving/reasoning tasks at 01/11/2022 0915   [place]/Accuracy with 90% accuracy, with minimum, verbal cues/redirection at 01/11/2022 0915   Time Frame long-term goal (LTG), 3 weeks at 01/11/2022 0915   Memory Goal 1    Activity short-term memory tasks, working memory tasks, prospective memory tasks, recall recent events  [mod level] at 01/11/2022 0915   [place]/Accuracy with 80% accuracy, moderate cues for use of strategies at 01/11/2022 0915   Memory Goal 2    Activity short-term memory tasks, working memory tasks, prospective memory tasks, recall recent events at 01/11/2022 0915   [place]/Accuracy with 90% accuracy, minimal cues for use of strategies at 01/11/2022 0915   Time Frame long-term goal (LTG), 3 weeks at 01/11/2022 0915

## 2022-01-14 NOTE — PROGRESS NOTES
Daily Progress Note    Patient was seen and examined.   Attestation Notes: Face to face encounter completed    Subjective     Interval History: has no complaint of headache, fever, chills, sweats, abd discomfort, new numbness, tingling or weakness... spoke with nursing and  and overnight nursing issues include persistent urinary retention.  Spoke with , CM, Dr. Orellana yesterday.  Pt will d/c home with hi catheter, continue antibiotic course with doxycyline, f/u with urology re urinary retention as outpt, adjusted steroid tape to help with headaches, and follow up with oncology, neurosurgery. . CS for bed mobility, min A for transfers, min A for ambulation with gait belt x 150ft, min A for negotiation of stairs.  History also provided by:     Objective     Vital signs in last 24 hours:  Temp:  [36.4 °C (97.6 °F)] 36.4 °C (97.6 °F)  Heart Rate:  [71-87] 87  Resp:  [16-18] 16  BP: ()/(46-70) 152/70      Intake/Output Summary (Last 24 hours) at 1/14/2022 0839  Last data filed at 1/14/2022 0200  Gross per 24 hour   Intake 100 ml   Output 799 ml   Net -699 ml     Intake/Output this shift:  No intake/output data recorded.    Review of Systems:  All other systems reviewed and negative except as noted in the HPI.    Labs  No new labs.    Imaging  Not applicable    VTE Assessment: TBD    Full Code    Physical Exam  Physical Exam  Constitutional:       Appearance: Normal appearance. She is well-developed.   HENT:      Head: Normocephalic and atraumatic.     Eyes:      Extraocular Movements:      Right eye: Abnormal extraocular motion present.      Left eye: Abnormal extraocular motion present.      Conjunctiva/sclera: Conjunctivae normal.      Pupils: Pupils are equal, round, and reactive to light.   Cardiovascular:      Rate and Rhythm: Normal rate and regular rhythm.   Pulmonary:      Effort: Pulmonary effort is normal.      Breath sounds: Normal breath sounds.   Abdominal:      General: Bowel sounds are  normal.      Palpations: Abdomen is soft.       Genitourinary:     Comments: No hi catheter  Musculoskeletal:         General: Normal range of motion.      Comments: No jt erythema, warmth or effusion   Skin:     General: Skin is warm and dry.   Neurological:      Mental Status: She is alert and oriented to person, place, and time.      Cranial Nerves: No cranial nerve deficit.      Sensory: No sensory deficit.      Motor: Weakness present. No tremor, abnormal muscle tone or seizure activity.      Coordination: Coordination abnormal.      Gait: Gait abnormal.      Comments: Fluent, naming and repetition intact, follows commands, facial muscle symmetric, tongue midline on protrusion, sensation to light touch intact, testing reveals 4/5 strength throughout left side, 5/5 strength right side, coordination L side noted on ROSS, HTS, no fix or drift, tone 0/4, no clonus.    Psychiatric:         Mood and Affect: Mood is depressed.         Speech: Speech normal.         Behavior: Behavior normal.             Plan of care was discussed with patient    Assessment & Plan  Urinary retention  Assessment & Plan  Stopped hytrin secondary to hypotension.  Continue to monitor PVR, cath prn  treating empirically for UTI with CTX and doxycycline.  Will d/c home on doxy    Chronic hyponatremia  Assessment & Plan  Monitor bmp    Hydrocephalus (CMS/HCC)  Assessment & Plan  S/p R VPS Delta 1.0 (placed on 1/6/2022)    Pain  Assessment & Plan  Tylenol, oxycodone prn    At high risk for deep venous thrombosis  Assessment & Plan  SC heparin    Risk for falls  Assessment & Plan  Wean off restraints as able    Lung cancer (CMS/HCC)  Assessment & Plan  Holding home Keytruda, can restart in 2 weeks  -Medical Oncologist: Dr. Wang Batista ([facility])  -Radiation Oncologist: Dr. Mckinney    * Brain metastases (CMS/HCC)  Assessment & Plan  Ms. Mast-year-old female with history metastatic lung adenocarcinoma Dx 2/2021 status post L suboccipital  craniotomy for brain met resection on 11/22/2021 who presented to [facility] on 1/4/2022 with persistent headache and worsening gait stability.  CT head postop course complicated by hypotension and MRI brain showed increase in pseudomeningocele and findings consistent with hydrocephalus compared to prior imaging.  Dr. Best neurosurgery performed right  shunt placement on 1/6/2022 (Delta 1.0).  Postop she was started on Decadron taper.  Her acute stay was complicated by hypertension which she received IV labetalol.  At time of transfer blood pressure was stable on losartan.  She was cleared to initiate heparin for DVT prophylaxis.  Chronic hyponatremia stable at time of transfer with sodium 133.  She was ultimately determined to be medically stable for transfer to [facility] on 1/10/2022 for an acute inpatient rehabilitation program to address deficits related to metastatic lung adenocarcinoma and hydrocephalus status post  shunt.    She is weightbearing as tolerated.  She will participate in 3 hours of physical therapy, Occupational Therapy, and speech therapy as well as evaluated by subsequent 4-hour rehab nursing care.  Throughout the follow-up EGD and fourth weeks prior to her appointment with Dr. Best from neurosurgery.        Expected Discharge Date:  1/20/2022  Home with home care    Addendum:   Discussed with nurse: Patient continues to refuse hi catheter placement. Patient to be discharged to home on Sunday 1/16/22 and is aware that she will need to go to the ER to be straight cathed/hi placement upon discharge. Patient did void on own several times this shift but still has large volumes of urine remaining upon PVR scans.      Above also discussed by me with  yesterday.  He will continue to encourage her to accept hi placement if not consistently voiding and/or continues to have elevated PVRs prior to discharge to home on Sunday.  If she does not agree to CIC or hi placement.   They will discharge to home and follow up with urology as outpatient and or ED for further management.

## 2022-01-14 NOTE — PROGRESS NOTES
Patient: Lisa Mast  [facility]  MRN: [id number]  Today's date: 1/14/2022    History of Present Illness  Lisa is a 66 y.o. female admitted on 1/10/2022 with Brain metastases (CMS/HCC) [C79.31]. Principal problem is Brain metastases (CMS/HCC).    66 yy.o. 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, radiation pneumonitis, esophageal stricture, and heart murmur presents for POV s/p SOC for resection of metastasis on 11/22/2021 presents with persistent headaches and worsening gait instability found to have hydrocephalus, pseudomeningocele, and interval increase of bleeding into resection cavity and is now s/p RF VPS, Delta 1.0 (1/6)     Neurosurgery note 1/10:   #Left cerebellar brain mass s/p SOC (11/22/2021) c/b hydrocephalus, pseudomeningocele, resection cavity hemorrhage now s/p RF VPS, Delta 1.0 (1/6)    #Post op Vision changes  -Post op (11/22): double / blurry vison, left eye ptosis, left eye nystagmus  -Seen by Ophthalmology last admission, scheduled for outpatient follow up in February    Past Medical History  Lisa has a past medical history of Hypertension, Lung cancer (CMS/HCC), and Lung mass.      PT Vitals    Date/Time Pulse BP BP Location BP Method Pt Position [facility]   01/14/22 1000 82 134/68 Left upper arm Automatic Sitting    01/14/22 1043 73 130/97 Left upper arm Automatic Sitting       PT Pain    Date/Time Pain Type Side/Orientation Location Rating: Rest Interventions [facility]   01/14/22 1000 Pain Assessment -- eye 8 quiet environment facilitated    01/14/22 1027 Pain Reassessment -- eye 8 medication offered but refused    01/14/22 1053 Post Activity bilateral eye 8 diversional activity provided           Prior Living Environment      Most Recent Value   People in Home spouse   Current Living Arrangements home   Living Environment Comment 2ST[facility]. Pt resides on 1st floor   Number of Stairs, Main Entrance 1   Stair Railings, Main Entrance  none   Stairs Comment, Main Entrance Reports  would assist PTA   Location, Patient Bedroom first (main) floor   Location, Bathroom first (main) floor   Bathroom Access Comment Walk in shower with small threshold. Has shower chair and grab bars. Grab bars by toilet          Prior Level of Function      Most Recent Value   Dominant Hand right   Ambulation assistive person   Transferring assistive person   Toileting assistive person   Bathing independent   Dressing independent  [ setup clothing]   Prior Level of Function Comment CGA/Taye from spouse. Pt lives with  in [place], enjoys taking walks and playing tennis,  Pt is health  and works full time,  Pt shares responsibility of managing household finances and scheduling calendars.  They own property in [place] which pt helps to manage   Assistive Device Currently Used at Home grab bar, shower chair, other (see comments)  [transport chair]           IRF PT Evaluation and Treatment - 01/14/22 1000        PT Time Calculation    Start Time 1000     Stop Time 1100     Time Calculation (min) 60 min        Session Details    Document Type daily treatment/progress note     Mode of Treatment physical therapy;individual therapy        General Information    Patient Profile Reviewed yes     General Observations of Patient cooperative, impulsive at times during stand to sit        Sit to Stand Transfer    [place], Sit to Stand Transfer minimum assist (75% or more patient effort)     Verbal Cues technique;safety     Assistive Device gait belt     Comment from w/c        Stand to Sit Transfer    [place], Stand to Sit Transfer minimum assist (75% or more patient effort)     Verbal Cues technique;safety     Assistive Device gait belt     Comment to w/c        Stand Pivot Transfer    [place], Stand Pivot/Stand Step Transfer minimum assist (75% or more patient effort)     Verbal Cues safety;technique     Assistive Device gait belt      "Comment amb approach        Gait Training    [place], Gait minimum assist (75% or more patient effort)     Assistive Device gait belt     Distance in Feet 200 feet     Pattern (Gait) step-through     Deviations/Abnormal Patterns (Gait) base of support, narrow;gait speed decreased;step length decreased     Left Sided Gait Deviations heel strike decreased     Right Sided Gait Deviations heel strike decreased     Comment (Gait/Stairs) 200ft x 2 without AD with min A x 1 at hips for balance and WS        Stairs Training    [place], Stairs minimum assist (75% or more patient effort)     Assistive Device railing     Handrail Location (Stairs) both sides     Number of Stairs 8     Stair Height 6 inches     Ascending Stairs Technique step-over-step     Descending Stairs Technique step-over-step     Comment self selected sequencing, min A for balance        Balance    Comment, Balance standing reaching with steady assist for 12 reps/2 sets; stance on blue foam with min/mod A for posterior LOB; tap ups to 6\" block with min A x 1 for balance and posterior lean for 10 reps; target taps to cones with min/mod A x 1 for 20 reps        Lower Extremity (Therapeutic Exercise)    Comment seated AP, LAQ, hip flex, hip abd with orange t band x 20 B LEs; standing heel raises, hip abd, minisquats x 20 B LEs AROM        Daily Progress Summary (PT)    Symptoms Noted During/After Treatment none     Progress Toward Functional Goals (PT) progressing toward functional goals as expected     Daily Outcome Statement Pt is progressing with increased amb distance and elevations this session. BP stable throughout. Limited by B eye pain, not improved with low lighting and decreased stim. Pt deferred any other meds.  Impulsive at times during stand to sit.  Significant posterior LOB while on compliant surfaces. Progress with dynamic and static standing balance tasks.                           IRF PT Goals      Most Recent Value   Bed Mobility " Goal 1    Activity/Assistive Device sit to supine/supine to sit, rolling to left, rolling to right at 01/11/2022 1037   [place] supervision required at 01/11/2022 1037   Time Frame short-term goal (STG), 1 week at 01/11/2022 1037   Bed Mobility Goal 2    Activity/Assistive Device sit to supine/supine to sit, rolling to left, rolling to right at 01/11/2022 1037   [place] modified independence at 01/11/2022 1037   Time Frame long-term goal (LTG), 21 days or less at 01/11/2022 1037   Transfer Goal 1    Activity/Assistive Device sit-to-stand/stand-to-sit, stand pivot, no assistive device at 01/11/2022 1037   [place] minimum assist (75% or more patient effort)  [steadying assist] at 01/11/2022 1037   Time Frame short-term goal (STG), 1 week at 01/11/2022 1037   Transfer Goal 2    Activity/Assistive Device sit-to-stand/stand-to-sit, bed-to-chair/chair-to-bed, car transfer, stand pivot, no assistive device at 01/11/2022 1037   [place] supervision required at 01/11/2022 1037   Time Frame long-term goal (LTG), 21 days or less at 01/11/2022 1037   Gait/Walking Locomotion Goal 1    Activity/Assistive Device gait (walking locomotion), no assistive device at 01/11/2022 1037   Distance 100 feet at 01/11/2022 1037   [place] minimum assist (75% or more patient effort) at 01/11/2022 1037   Time Frame short-term goal (STG), 1 week at 01/11/2022 1037   Gait/Walking Locomotion Goal 2    Activity/Assistive Device gait (walking locomotion), no assistive device at 01/11/2022 1037   Distance 200 feet at 01/11/2022 1037   [place] supervision required at 01/11/2022 1037   Time Frame long-term goal (LTG), 21 days or less at 01/11/2022 1037   Stairs Goal 1    Activity/Assistive Device ascending stairs, descending stairs, using handrail, left, using handrail, right at 01/12/2022 1327   Number of Stairs 8 at 01/12/2022 1327   [place] minimum assist (75% or more patient effort) at 01/12/2022 1327   Time Frame  short-term goal (STG), 1 week at 01/12/2022 1327   Stairs Goal 2    Activity/Assistive Device ascending stairs, descending stairs, using handrail, left, using handrail, right at 01/12/2022 1327   Number of Stairs 12 at 01/12/2022 1327   [place] minimum assist (75% or more patient effort)  [steadying assist] at 01/12/2022 1327   Time Frame long-term goal (LTG), 21 days or less at 01/12/2022 1327

## 2022-01-14 NOTE — PROGRESS NOTES
01/14/22 1100   Discharge Needs Assessment   Anticipated Discharge Disposition home with home health   Discharge Planning   Type of Home Care Services home OT;home PT;home SLP;nursing   Discharge Transportation   Does the patient need discharge transport arranged? No   Plan   Plan home with family. [place] care at home and private duty nursing   Patient/Family in Agreement with Plan yes   Plan Comments patient for dc home on Sunday the 16th. Goals met

## 2022-01-14 NOTE — PLAN OF CARE
Plan of Care Review  Plan of Care Reviewed With: patient  Progress: improving  Outcome Summary: Pt had c/o HA with good relief from PRN oxy and tylenol. Still having difficulty swallowing crushed meds. IVF maintained overnight. Discussed with pt the need to possibly cath if unable to void, pt was agreeable. Pt was able to void 450 of clear jose/yellow urine. Sleeping well, bed alarm on, call bell within reach. Srx4 maintained for pt safety.

## 2022-01-14 NOTE — SUBJECTIVE & OBJECTIVE
Patient was seen and examined.   Attestation Notes: Face to face encounter completed    Subjective     Interval History: has no complaint of headache, fever, chills, sweats, abd discomfort, new numbness, tingling or weakness... spoke with nursing and  and overnight nursing issues include persistent urinary retention.  Spoke with , CM, Dr. Orellana yesterday.  Pt will d/c home with hi catheter, continue antibiotic course with doxycyline, f/u with urology re urinary retention as outpt, adjusted steroid tape to help with headaches, and follow up with oncology, neurosurgery. . CS for bed mobility, min A for transfers, min A for ambulation with gait belt x 150ft, min A for negotiation of stairs.  History also provided by:     Objective     Vital signs in last 24 hours:  Temp:  [36.4 °C (97.6 °F)] 36.4 °C (97.6 °F)  Heart Rate:  [71-87] 87  Resp:  [16-18] 16  BP: ()/(46-70) 152/70      Intake/Output Summary (Last 24 hours) at 1/14/2022 0839  Last data filed at 1/14/2022 0200  Gross per 24 hour   Intake 100 ml   Output 799 ml   Net -699 ml     Intake/Output this shift:  No intake/output data recorded.    Review of Systems:  All other systems reviewed and negative except as noted in the HPI.    Labs  No new labs.    Imaging  Not applicable    VTE Assessment: TBD    Full Code    Physical Exam  Physical Exam  Constitutional:       Appearance: Normal appearance. She is well-developed.   HENT:      Head: Normocephalic and atraumatic.     Eyes:      Extraocular Movements:      Right eye: Abnormal extraocular motion present.      Left eye: Abnormal extraocular motion present.      Conjunctiva/sclera: Conjunctivae normal.      Pupils: Pupils are equal, round, and reactive to light.   Cardiovascular:      Rate and Rhythm: Normal rate and regular rhythm.   Pulmonary:      Effort: Pulmonary effort is normal.      Breath sounds: Normal breath sounds.   Abdominal:      General: Bowel sounds are normal.      Palpations:  Abdomen is soft.       Genitourinary:     Comments: No hi catheter  Musculoskeletal:         General: Normal range of motion.      Comments: No jt erythema, warmth or effusion   Skin:     General: Skin is warm and dry.   Neurological:      Mental Status: She is alert and oriented to person, place, and time.      Cranial Nerves: No cranial nerve deficit.      Sensory: No sensory deficit.      Motor: Weakness present. No tremor, abnormal muscle tone or seizure activity.      Coordination: Coordination abnormal.      Gait: Gait abnormal.      Comments: Fluent, naming and repetition intact, follows commands, facial muscle symmetric, tongue midline on protrusion, sensation to light touch intact, testing reveals 4/5 strength throughout left side, 5/5 strength right side, coordination L side noted on ROSS, HTS, no fix or drift, tone 0/4, no clonus.    Psychiatric:         Mood and Affect: Mood is depressed.         Speech: Speech normal.         Behavior: Behavior normal.             Plan of care was discussed with patient

## 2022-01-14 NOTE — ASSESSMENT & PLAN NOTE
Holding home Keytruda, can restart in 2 weeks  -Medical Oncologist: Dr. Wang Batista ([facility])  -Radiation Oncologist: Dr. Mckinney

## 2022-01-14 NOTE — LACTATION NOTE
Patient continues to refuse hi catheter placement. Patient to be discharged to home on Sunday 1/16/22 and is aware that she will need to go to the ER to be straight cathed upon discharge. Patient did void on own several times this shift but still has large volumes of urine remaining upon PVR scans. Attending MD as well as nursing supervisor aware.

## 2022-01-14 NOTE — ASSESSMENT & PLAN NOTE
Ms. Mast-year-old female with history metastatic lung adenocarcinoma Dx 2/2021 status post L suboccipital craniotomy for brain met resection on 11/22/2021 who presented to [facility] on 1/4/2022 with persistent headache and worsening gait stability.  CT head postop course complicated by hypotension and MRI brain showed increase in pseudomeningocele and findings consistent with hydrocephalus compared to prior imaging.  Dr. Best neurosurgery performed right  shunt placement on 1/6/2022 (Delta 1.0).  Postop she was started on Decadron taper.  Her acute stay was complicated by hypertension which she received IV labetalol.  At time of transfer blood pressure was stable on losartan.  She was cleared to initiate heparin for DVT prophylaxis.  Chronic hyponatremia stable at time of transfer with sodium 133.  She was ultimately determined to be medically stable for transfer to [facility] on 1/10/2022 for an acute inpatient rehabilitation program to address deficits related to metastatic lung adenocarcinoma and hydrocephalus status post  shunt.    She is weightbearing as tolerated.  She will participate in 3 hours of physical therapy, Occupational Therapy, and speech therapy as well as evaluated by subsequent 4-hour rehab nursing care.  Throughout the follow-up EGD and fourth weeks prior to her appointment with Dr. Best from neurosurgery.

## 2022-01-14 NOTE — PLAN OF CARE
Reviewed pain assessment process as well as medication regimen with patient- questions encouraged.

## 2022-01-14 NOTE — ASSESSMENT & PLAN NOTE
Stopped hytrin secondary to hypotension.  Continue to monitor PVR, cath prn  treating empirically for UTI with CTX and doxycycline.  Will d/c home on doxy

## 2022-01-14 NOTE — PLAN OF CARE
Problem: Rehabilitation (IRF) Plan of Care  Goal: Plan of Care Review  Flowsheets (Taken 1/14/2022 1020)  Progress: no change  Plan of Care Reviewed With: (Mariela Pozo - nurse guardian advocate)   spouse   other (see comments)  Outcome Summary: call from advocate stating 24 hour private duty aide has been hired and will be in place when patient comes home Sunday.   Nurse will come to hospital this date from 12-1 for assessment of patient.  CM awaiting word as to family preference for Skilled home therapies and will arrange . in meantime medicare providers given.  [place] on the 16th.    - Radha ELIZABETH   A list of providers that are participating in the Medicare program and are located in the desired geographic area was presented and reviewed with the patient and/or patient guardian or advocate. The list and ratings were provided from the Medicare.gov website.

## 2022-01-14 NOTE — DISCHARGE INSTR - APPOINTMENTS
[facility]   [phone]   RN PT OT ST    They will call to schedule visits.

## 2022-01-15 ENCOUNTER — APPOINTMENT (INPATIENT)
Dept: OCCUPATIONAL THERAPY | Facility: REHABILITATION | Age: 67
DRG: 949 | End: 2022-01-15
Payer: COMMERCIAL

## 2022-01-15 ENCOUNTER — APPOINTMENT (INPATIENT)
Dept: PHYSICAL THERAPY | Facility: REHABILITATION | Age: 67
DRG: 949 | End: 2022-01-15
Payer: COMMERCIAL

## 2022-01-15 LAB
GLUCOSE BLD-MCNC: 111 MG/DL (ref 70–99)
GLUCOSE BLD-MCNC: 130 MG/DL (ref 70–99)
GLUCOSE BLD-MCNC: 146 MG/DL (ref 70–99)
LACTATE SERPL-SCNC: 1.2 MMOL/L (ref 0.4–2)
POCT TEST: ABNORMAL

## 2022-01-15 PROCEDURE — 63700000 HC SELF-ADMINISTRABLE DRUG: Performed by: INTERNAL MEDICINE

## 2022-01-15 PROCEDURE — 36415 COLL VENOUS BLD VENIPUNCTURE: CPT | Performed by: STUDENT IN AN ORGANIZED HEALTH CARE EDUCATION/TRAINING PROGRAM

## 2022-01-15 PROCEDURE — 97530 THERAPEUTIC ACTIVITIES: CPT | Mod: GO,59

## 2022-01-15 PROCEDURE — 63600000 HC DRUGS/DETAIL CODE: Performed by: PHYSICAL MEDICINE & REHABILITATION

## 2022-01-15 PROCEDURE — 97535 SELF CARE MNGMENT TRAINING: CPT | Mod: GO

## 2022-01-15 PROCEDURE — 97116 GAIT TRAINING THERAPY: CPT | Mod: GP

## 2022-01-15 PROCEDURE — 63600000 HC DRUGS/DETAIL CODE: Performed by: INTERNAL MEDICINE

## 2022-01-15 PROCEDURE — 97530 THERAPEUTIC ACTIVITIES: CPT | Mod: GP,59

## 2022-01-15 PROCEDURE — 92526 ORAL FUNCTION THERAPY: CPT | Mod: GN

## 2022-01-15 PROCEDURE — 97112 NEUROMUSCULAR REEDUCATION: CPT | Mod: GP

## 2022-01-15 PROCEDURE — 12800001 HC ROOM AND CARE SEMIPRIVATE REHAB-BRAIN INJ

## 2022-01-15 PROCEDURE — 83605 ASSAY OF LACTIC ACID: CPT | Performed by: STUDENT IN AN ORGANIZED HEALTH CARE EDUCATION/TRAINING PROGRAM

## 2022-01-15 PROCEDURE — 63700000 HC SELF-ADMINISTRABLE DRUG: Performed by: PHYSICAL MEDICINE & REHABILITATION

## 2022-01-15 RX ADMIN — DOCUSATE SODIUM 100 MG: 100 CAPSULE, LIQUID FILLED ORAL at 21:18

## 2022-01-15 RX ADMIN — CHLORHEXIDINE GLUCONATE 0.12% ORAL RINSE 15 ML: 1.2 LIQUID ORAL at 21:18

## 2022-01-15 RX ADMIN — CHLORHEXIDINE GLUCONATE 0.12% ORAL RINSE 15 ML: 1.2 LIQUID ORAL at 08:06

## 2022-01-15 RX ADMIN — DOXYCYCLINE HYCLATE 100 MG: 100 TABLET, COATED ORAL at 21:18

## 2022-01-15 RX ADMIN — DEXAMETHASONE 4 MG: 4 TABLET ORAL at 17:08

## 2022-01-15 RX ADMIN — DOCUSATE SODIUM 100 MG: 100 CAPSULE, LIQUID FILLED ORAL at 08:06

## 2022-01-15 RX ADMIN — DEXAMETHASONE 4 MG: 4 TABLET ORAL at 05:57

## 2022-01-15 RX ADMIN — ACETAMINOPHEN ORAL SOLUTION 650 MG: 650 SOLUTION ORAL at 14:06

## 2022-01-15 RX ADMIN — ACETAMINOPHEN ORAL SOLUTION 650 MG: 650 SOLUTION ORAL at 21:24

## 2022-01-15 RX ADMIN — SENNOSIDES 2 TABLET: 8.6 TABLET, FILM COATED ORAL at 08:06

## 2022-01-15 RX ADMIN — DEXAMETHASONE 4 MG: 4 TABLET ORAL at 00:05

## 2022-01-15 RX ADMIN — PANTOPRAZOLE SODIUM 40 MG: 40 TABLET, DELAYED RELEASE ORAL at 08:06

## 2022-01-15 RX ADMIN — ACETAMINOPHEN ORAL SOLUTION 650 MG: 650 SOLUTION ORAL at 08:09

## 2022-01-15 RX ADMIN — LOSARTAN POTASSIUM 50 MG: 50 TABLET, FILM COATED ORAL at 17:08

## 2022-01-15 RX ADMIN — DEXAMETHASONE 4 MG: 4 TABLET ORAL at 11:34

## 2022-01-15 RX ADMIN — ENOXAPARIN SODIUM 40 MG: 40 INJECTION SUBCUTANEOUS at 17:08

## 2022-01-15 RX ADMIN — DOXYCYCLINE HYCLATE 100 MG: 100 TABLET, COATED ORAL at 08:06

## 2022-01-15 ASSESSMENT — BALANCE ASSESSMENTS: TOTAL SCORE: 23

## 2022-01-15 NOTE — PLAN OF CARE
Problem: Rehabilitation (IRF) Plan of Care  Goal: Plan of Care Review  Flowsheets (Taken 1/15/2022 7724)  Plan of Care Reviewed With: patient  Outcome Summary: PT DC evaluation completed.  FT to be completed with  and niece 1/16/21.  Recommend continued skilled PT services upon DC home 1/16/2021.

## 2022-01-15 NOTE — PLAN OF CARE
Plan of Care Review  Plan of Care Reviewed With: patient  Progress: no change  Outcome Summary: Lisa is AO x 3, VSS on RA. Head pain this shift, patient only wanting PRN tylenol and not oxycodone. Continent of small amount of urine this shift, otherwise retaining.

## 2022-01-15 NOTE — PLAN OF CARE
Problem: Rehabilitation (IRF) Plan of Care  Goal: Plan of Care Review  Flowsheets (Taken 1/15/2022 1143)  Plan of Care Reviewed With: patient  Outcome Summary: OT discharge evaluation completed

## 2022-01-15 NOTE — PROGRESS NOTES
Patient: Lisa Mast  Location: [facility]  MRN: [id number]  Today's date: 1/15/2022    History of Present Illness  Lisa is a 66 y.o. female admitted on 1/10/2022 with Brain metastases (CMS/HCC) [C79.31]. Principal problem is Brain metastases (CMS/HCC).    66 yy.o. 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, radiation pneumonitis, esophageal stricture, and heart murmur presents for POV s/p SOC for resection of metastasis on 11/22/2021 presents with persistent headaches and worsening gait instability found to have hydrocephalus, pseudomeningocele, and interval increase of bleeding into resection cavity and is now s/p RF VPS, Delta 1.0 (1/6)     Neurosurgery note 1/10:   #Left cerebellar brain mass s/p SOC (11/22/2021) c/b hydrocephalus, pseudomeningocele, resection cavity hemorrhage now s/p RF VPS, Delta 1.0 (1/6)    #Post op Vision changes  -Post op (11/22): double / blurry vison, left eye ptosis, left eye nystagmus  -Seen by Ophthalmology last admission, scheduled for outpatient follow up in February    Past Medical History  Lisa has a past medical history of Hypertension, Lung cancer (CMS/HCC), and Lung mass.      OT Vitals    Date/Time Pulse HR Source BP BP Location BP Method Pt Position [facility]   01/15/22 0839 90 Monitor 100/57 Left upper arm Automatic Lying LM   01/15/22 0902 85 Monitor 135/101 Left upper arm Automatic Sitting LM   01/15/22 0906 88 Apical -- -- -- -- KG   01/15/22 0922 79 Monitor 138/86 Left upper arm Automatic Lying LM      OT Pain    Date/Time Pain Type Acceptable Pain Level Location Rating: Rest Rating: Activity Interventions [facility]   01/15/22 0839 Pain Assessment -- head 8 8 premedicated for activity LM   01/15/22 0854 Pain Reassessment 2 -- 8 8 medication offered but refused;pillow support provided;position adjusted KG   01/15/22 0919 Pain Reassessment -- head 8 8 premedicated for activity LM          Prior Living Environment      Most Recent  Value   People in Home spouse   Current Living Arrangements home   Living Environment Comment [facility]ST[facility]. Pt resides on 1st floor   Number of Stairs, Main Entrance 1   Stair Railings, Main Entrance none   Stairs Comment, Main Entrance Reports  would assist PTA   Location, Patient Bedroom first (main) floor   Location, Bathroom first (main) floor   Bathroom Access Comment Walk in shower with small threshold. Has shower chair and grab bars. Grab bars by toilet          Prior Level of Function      Most Recent Value   Dominant Hand right   Ambulation assistive person   Transferring assistive person   Toileting assistive person   Bathing independent   Dressing independent  [ setup clothing]   Prior Level of Function Comment CGA/Taye from spouse. Pt lives with  in [place], enjoys taking walks and playing tennis,  Pt is health  and works full time,  Pt shares responsibility of managing household finances and scheduling calendars.  They own property in [place] which pt helps to manage   Assistive Device Currently Used at Home grab bar, shower chair, other (see comments)  [transport chair]          Occupational Profile      Most Recent Value   Successful Occupations Works as . +   Occupational History/Life Experiences Enjoys playing tennis, rides horses   Environmental Supports and Barriers Supportive    Patient Goals Improve my balance, vision           IRF OT Evaluation and Treatment - 01/15/22 0832        OT Time Calculation    Start Time 0830     Stop Time 0930     Time Calculation (min) 60 min        Session Details    Document Type discharge evaluation     Mode of Treatment occupational therapy;individual therapy        General Information    Patient Profile Reviewed yes     General Observations of Patient Recieved supine in bed; agreeable to ADL        Vision Assessment/Intervention    Motor Free Visual Perception Test (MVPT) Issued occular motor/VOR HEP         Sensory Assessment (Somatosensory)    Left UE Sensory Assessment light touch awareness;proprioception;intact     Right UE Sensory Assessment light touch awareness;proprioception;intact        Range of Motion (ROM)    Left Upper Extremity (ROM) left UE ROM is WFL     Right Upper Extremity (ROM) right UE ROM is WFL        Strength (Manual Muscle Testing)    Shoulder, Left (Strength) 4     Elbow, Left (Strength) 4     Wrist, Left (Strength) 4     Shoulder, Right (Strength) 4     Elbow, Right (Strength) 4     Wrist, Right (Strength) 4        Bed Mobility    [place], Supine to Sit supervision        Sit to Stand Transfer    [place], Sit to Stand Transfer close supervision     Verbal Cues safety     Assistive Device gait belt     Comment in ADL context        Stand to Sit Transfer    [place], Stand to Sit Transfer close supervision     Verbal Cues safety     Assistive Device gait belt     Comment in ADL context        Toilet Transfer    Transfer Technique stand pivot     [place], Toilet Transfer minimum assist (75% or more patient effort)     Verbal Cues safety     Assistive Device gait belt;grab bars/safety frame;raised toilet seat     Comment Amb appraoch L arm over        Shower Transfer    Transfer Technique stand pivot     [place], Shower Transfer minimum assist (75% or more patient effort)     Verbal Cues safety     Assistive Device gait belt;grab bars/tub rail;shower chair     Comment Amb level L arm over        Safety Issues, Functional Mobility    Comment, Safety Issues/Impairments (Mobility) OT: min A c gait belt and L arm over per prior technique used at home        Balance    Comment, Balance S seated level; CS in static stance during ADL; steady A dynamic stance        Bathing    Self-Performance chest;left arm;right arm;abdomen;front perineal area;buttocks;left upper leg;right upper leg;left lower leg, including foot;right lower leg, including foot     [place] close  supervision     Position supported sitting     Setup Assistance adaptive equipment setup;adjust water temperature/flow;obtain supplies     Adaptive Equipment grab bar/tub rail;hand-held shower spray hose;shower chair     Comment CS seated level        Upper Body Dressing    Tasks pull over garment     Self-Performance threads left arm, shirt;threads right arm, shirt;pulls shirt over head/around back;pulls shirt down/adjusts     [place] Assistance obtains clothes     [place] supervision     Position edge of bed sitting     Adaptive Equipment none     Comment post setup        Lower Body Dressing    Tasks pants/bottoms;socks;underwear     Self-Performance threads left leg, underpants;threads right leg, underpants;pulls underpants up or down;threads left leg, pants/shorts;threads right leg, pants/shorts;pulls pants/shorts up or down;dons/doffs left sock;dons/doffs right sock     [place] Assistance obtains clothes     [place] close supervision     Position unsupported standing     Adaptive Equipment none     [place], Footwear supervision     Comment Cues for safety to doff pants/underwear seated instead of standing        Grooming    Self-Performance washes, rinses and dries hands;washes, rinses and dries face;brushes/wolf hair;oral care (brushing teeth, cleaning dentures);applies deodorant     [place] close supervision     Position edge of bed sitting;supported sitting;unsupported standing     Setup Assistance obtain supplies     Adaptive Equipment electric/power toothbrush     [place], Oral Hygiene close supervision     Comment Seated in shower for hand/face washing; seated EOB for deodorant application and hair care; standing at sink for oral care        Toileting    [place] close supervision     Position supported sitting;supported standing     Setup Assistance adaptive equipment setup     Adaptive Equipment accessible height toilet;grab bar/safety frame     Comment S seated on toilet;  CS in stance during CM`        Discharge Summary (OT)    Outcomes Achieved Upon Discharge (OT) progress was made toward goals     Discharge Summary Statement (OT) Pt is a 66 year old female with brain mets. She is s/p tumor resection and VPS placement. She has progressed to a min A level for functional transfers and grossly CS for ADL routine. She already owns a shower chair and has grab bars in the bathroom. No other DME needed at this time. FT to be completed 1/16/21. Issued occular-motor HEP with handouts provided. Pt demo and verbalizes understanding. Pt already has appointment scheduled with eye doctor for ongoing management. Recommend continued skilled OT services upon d/c to home 1/16/2021.     Transfer to Another Level of Care or Facility (OT) recommend continued therapy following discharge;recommend therapy via home health                      Education Documentation  Range of Motion/Motor Control Strategies, taught by Giovanna Hagen, WILBERT at 1/15/2022  9:14 AM.  Learner: Patient  Readiness: Acceptance  Method: Explanation, Handout  Response: Verbalizes Understanding  Comment: Issued handout of occulor motor / VOR eye exercises          IRF OT Goals      Most Recent Value   Transfer Goal 1    Activity/Assistive Device toilet at 01/11/2022 0721   [place] supervision required  [CS] at 01/11/2022 0721   Time Frame short-term goal (STG), 5 - 7 days at 01/11/2022 0721   Progress/Outcome goal not met at 01/15/2022 0832   Transfer Goal 2    Activity/Assistive Device toilet at 01/11/2022 0721   [place] modified independence at 01/11/2022 0721   Time Frame long-term goal (LTG), 21 days or less at 01/11/2022 0721   Progress/Outcome goal not met at 01/15/2022 0832   Transfer Goal 3    Activity/Assistive Device shower at 01/11/2022 0721   [place] supervision required  [CS] at 01/11/2022 0721   Time Frame short-term goal (STG), 5 - 7 days at 01/11/2022 0721   Progress/Outcome goal not met at 01/15/2022 0832    Transfer Goal 4    Activity/Assistive Device shower at 01/11/2022 0721   [place] supervision required at 01/11/2022 0721   Time Frame long-term goal (LTG), 21 days or less at 01/11/2022 0721   Progress/Outcome goal not met at 01/15/2022 0832   Bathing Goal 1    Activity/Assistive Device bathing skills, all at 01/11/2022 0721   [place] supervision required  [CS] at 01/11/2022 0721   Time Frame short-term goal (STG), 5 - 7 days at 01/11/2022 0721   Progress/Outcome goal met at 01/15/2022 0832   Bathing Goal 2    Activity/Assistive Device bathing skills, all at 01/11/2022 0721   [place] supervision required at 01/11/2022 0721   Time Frame long-term goal (LTG), 21 days or less at 01/11/2022 0721   Progress/Outcome goal not met at 01/15/2022 0832   UB Dressing Goal 1    Activity/Assistive Device upper body dressing at 01/11/2022 0721   [place] supervision required  [CS with item retrieval] at 01/11/2022 0721   Time Frame short-term goal (STG), 5 - 7 days at 01/11/2022 0721   Strategies/Barriers not including item retrieval at 01/15/2022 0832   Progress/Outcome goal partially met at 01/15/2022 0832   UB Dressing Goal 2    Activity/Assistive Device upper body dressing at 01/11/2022 0721   [place] modified independence at 01/11/2022 0721   Time Frame long-term goal (LTG), 21 days or less at 01/11/2022 0721   Progress/Outcome goal not met at 01/15/2022 0832   LB Dressing Goal 1    Activity/Assistive Device lower body dressing at 01/11/2022 0721   [place] supervision required  [CS] at 01/11/2022 0721   Time Frame short-term goal (STG), 5 - 7 days at 01/11/2022 0721   Progress/Outcome goal met at 01/15/2022 0832   LB Dressing Goal 2    Activity/Assistive Device lower body dressing at 01/11/2022 0721   [place] modified independence at 01/11/2022 0721   Time Frame long-term goal (LTG), 21 days or less at 01/11/2022 0721   Progress/Outcome goal not met at 01/15/2022 0832   Grooming Goal 1     Activity/Assistive Device grooming skills, all at 01/11/2022 0721   [place] supervision required  [CS in stance] at 01/11/2022 0721   Time Frame short-term goal (STG), 5 - 7 days at 01/11/2022 0721   Progress/Outcome goal met at 01/15/2022 0832   Grooming Goal 2    Activity/Assistive Device grooming skills, all at 01/11/2022 0721   [place] modified independence at 01/11/2022 0721   Time Frame long-term goal (LTG), 21 days or less at 01/11/2022 0721   Progress/Outcome goal not met at 01/15/2022 0832   Toileting Goal 1    Activity/Assistive Device toileting skills, all at 01/11/2022 0721   [place] supervision required  [CS] at 01/11/2022 0721   Time Frame short-term goal (STG), 5 - 7 days at 01/11/2022 0721   Progress/Outcome goal met at 01/15/2022 0832   Toileting Goal 2    Activity/Assistive Device toileting skills, all at 01/11/2022 0721   [place] modified independence at 01/11/2022 0721   Time Frame long-term goal (LTG), 21 days or less at 01/11/2022 0721   Progress/Outcome goal not met at 01/15/2022 0832

## 2022-01-15 NOTE — DISCHARGE INSTR - ACTIVITY
Occupational Therapy     Toilet Transfers: Minimal assistance to toilet with use of gait belt    Shower/Tub Transfers: Minimal assistance to shower with grab bars and use of shower chair. Gait belt for all mobility    Upper Body Dressing: Supervision post setup of clothing while seated    Lower Body Dressing: Close supervision post setup of clothing    Bathing: Close supervision at a seated level    Toileting: Close supervision    Grooming: Close Supervision standing    Household Mobility/Household Activity: Minimal assistance with gait belt. Recommend assistance for higher level cooking, cleaning tasks    Driving: Driving is unsafe and not recommended at this time.     Vision: Recommend follow up with ophthalmologist for ongoing management of visual deficits. Eye exercise HEP provided 1/15/2021 with handout.    Entered by: THIERRY Sim, OTR/L 1/15/2021    Physical Therapy:      Bed mobility: Supervision for safety with rolling Bilaterally and sit<>supine transfers.     Transfers: Close Supervision for sit<>stand transfers and Minimum Assist via Left arm over assist + gait belt for stand pivot transfers.     Ambulation: Minimum Assist via Left arm over assist + gait belt for ambulation indoors over level surfaces up to 300'.     Elevations: Steadying Assist + gait belt for 12 stairs with use of Bilateral hand rails using step-over pattern. Minimum Assist + gait belt for up/down single curb step.     Wheelchair Mobility: Not needed at time of discharge.      Entered by: Sandrine Whaley PT, DPT on: 1/15/2022                        Additional    Durable Medical Equipment: Please continue to use shower chair and grab bars in the bathroom for safety, as well as provided gait belt for all mobility. Lisa has increased postural sway to Right and Left when moving and turning. She benefits from hands on assist as above and use of gait belt to improve balance and reduce risk of falling.        SPEECH THERAPY:  Diet level  "is Regular items for pleasure and preference but you do best with foods that are soft, bite-sized and are more \"slippery\" by adding gravies and sauces. You can minimize your risk of aspiration (food/liquid going down the wrong pipe) by using the following swallow strategies that are also listed on the handout provided and practiced during therapy sessions.  The swallow strategies that help you are;     EATING SITTING UPRIGHT IN A CHAIR AND REMAIN SITTING UPRIGHT FOR 30 MINUTES AFTER EATING TO MINIMIZE REFLUX    TAKE SMALL BITES of food    SWALLOW ,AND FOLLOW UP WITH SMALL SINGLE SIPS OF LIQUIDS until sensation of food being stuck in throat is gone    AVOID USE OF STRAWS  "

## 2022-01-15 NOTE — NURSING NOTE
Lisa continues to refuse placement of hi catheter. Education of need for hi provided by myself and Elza KIM RN. Sat with patient and discussed and utilized active listening/answered questions. Patient states she has a friend who will provide intermittent catheters to her at home, but could not state how she will get the supplies or know the amount of urine in bladder without a bladder scanner. Patient voiding small amounts today, but still has large volumes of urine on PVR scans. Explained to Lisa that she will need to go to the ER to be straight cathed or get a hi after discharge on 1/16. Will speak to  when visiting today as well.

## 2022-01-15 NOTE — NURSING NOTE
Lisa c/o head pain unrelieved with tylenol PRN. This RN explained pain assessment process as well as availability of oxycodone for pain with patient refusal. Questions encouraged.

## 2022-01-15 NOTE — PROGRESS NOTES
Patient: Lisa Mast  Location: [facility] [address]  MRN: [id number]  Today's date: 1/15/2022    History of Present Illness  Lisa is a 66 y.o. female admitted on 1/10/2022 with Brain metastases (CMS/HCC) [C79.31]. Principal problem is Brain metastases (CMS/HCC).    66 yy.o. 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, radiation pneumonitis, esophageal stricture, and heart murmur presents for POV s/p SOC for resection of metastasis on 11/22/2021 presents with persistent headaches and worsening gait instability found to have hydrocephalus, pseudomeningocele, and interval increase of bleeding into resection cavity and is now s/p RF VPS, Delta 1.0 (1/6)     Neurosurgery note 1/10:   #Left cerebellar brain mass s/p SOC (11/22/2021) c/b hydrocephalus, pseudomeningocele, resection cavity hemorrhage now s/p RF VPS, Delta 1.0 (1/6)    #Post op Vision changes  -Post op (11/22): double / blurry vison, left eye ptosis, left eye nystagmus  -Seen by Ophthalmology last admission, scheduled for outpatient follow up in February    Past Medical History  Lisa has a past medical history of Hypertension, Lung cancer (CMS/HCC), and Lung mass.      PT Vitals    Date/Time Pulse HR Source BP BP Location BP Method Pt Position [facility]   01/15/22 1503 81 Monitor 152/78 Left upper arm Automatic Sitting PLP      PT Pain    Date/Time Pain Type Location Rating: Rest Rating: Activity Interventions [facility]   01/15/22 1503 Pain Assessment head 5 5 premedicated for activity;prescribed exercises encouraged PLP   01/15/22 1526 Pain Reassessment head 5 -- position adjusted PLP          Prior Living Environment      Most Recent Value   People in Home spouse   Current Living Arrangements home   Living Environment Comment 2STH. Pt resides on 1st floor   Number of Stairs, Main Entrance 1   Stair Railings, Main Entrance none   Stairs Comment, Main Entrance Reports  would assist PTA   Location, Patient Bedroom first  (main) floor   Location, Bathroom first (main) floor   Bathroom Access Comment Walk in shower with small threshold. Has shower chair and grab bars. Grab bars by toilet          Prior Level of Function      Most Recent Value   Dominant Hand right   Ambulation assistive person   Transferring assistive person   Toileting assistive person   Bathing independent   Dressing independent  [ setup clothing]   Prior Level of Function Comment CGA/Taye from spouse. Pt lives with  in [place], enjoys taking walks and playing tennis,  Pt is health  and works full time,  Pt shares responsibility of managing household finances and scheduling calendars.  They own property in [place] which pt helps to manage   Assistive Device Currently Used at Home grab bar, shower chair, other (see comments)  [transport chair]           IRF PT Evaluation and Treatment - 01/15/22 1506        PT Time Calculation    Start Time 1500     Stop Time 1530     Time Calculation (min) 30 min        Session Details    Document Type daily treatment/progress note     Mode of Treatment individual therapy;physical therapy        General Information    Patient Profile Reviewed yes     General Observations of Patient pt received in map gym; agreeable to therapy, with complaint of headache        Bed Mobility    [place], Roll Left supervision     [place], Roll Right supervision     [place], Supine to Sit supervision     [place], Sit to Supine supervision     Comment (Bed Mobility) performed in pt's bed, S for safety        Sit to Stand Transfer    [place], Sit to Stand Transfer close supervision     Verbal Cues safety     Assistive Device gait belt     Comment from wc; clS for safety        Stand to Sit Transfer    [place], Stand to Sit Transfer close supervision     Verbal Cues safety     Assistive Device gait belt     Comment to wc and EOB; clS for safety        Stand Pivot Transfer    [place], Stand  Pivot/Stand Step Transfer minimum assist (75% or more patient effort)     Verbal Cues safety     Assistive Device gait belt     Comment [place] at pelvis with L HHA for balance while turning due to increased postural sway        Gait Training    [place], Gait minimum assist (75% or more patient effort)     Assistive Device gait belt     Distance in Feet 100 feet     Pattern (Gait) step-through     Deviations/Abnormal Patterns (Gait) base of support, narrow;gait speed decreased;stride length decreased;weight shifting decreased     Left Sided Gait Deviations heel strike decreased     Right Sided Gait Deviations heel strike decreased     Comment (Gait/Stairs) 100' with multiple turns, L HHA and Terrence around pelvis for balance and controlled weight shift; Pt demo increased postural sway to R but no LOB        10 Meter Walk Test (Self-Selected Velocity)    Trial One: Ten Meter Walk Test (sec) 8.31 seconds     Trial Two: Ten Meter Walk Test (sec) 9.12 seconds     Trial One: Gait Speed (m/s) 0.72 m/s     Trial Two: Gait Speed (m/s) 0.66 m/s     Average Gait Speed (m/s): Two Trials 0.69 m/s        Joseph Balance Scale    Sitting to Standing Able to stand independently using hands     Standing Unsupported Able to stand 2 minutes with supervision     Sitting with Back Unsupported but Feet Supported on Floor or on a Stool Able to sit safely and securely for 2 minutes     Standing to Sitting Controls descent by using hands     Transfers Able to transfer with verbal cueing and/or supervision     Standing Unsupported with Eyes Closed Able to stand 10 seconds with supervision     Standing Unsupported with Feet Together Needs help to attain position but able to stand 15 seconds feet together     Reach Forward with Outstretched Arm While Standing Reaches forward but needs supervision      Object from Floor from a Standing Position Unable to try/needs assist to keep from losing balance or falling     Turning to Look Behind Over  Left and Right Shoulders While Standing Needs supervision when turning     Turn 360 Degrees Needs assistance while turning     Place Alternate Foot on Step or Stool While Standing Unsupported Able to complete greater than 2 steps needs minimal assist     Standing Unsupported One Foot in Front Needs help to step but can hold 15 seconds     Standing on One Leg Unable to try needs assist to prevent fall     Joseph Balance Score 23        Daily Progress Summary (PT)    Daily Outcome Statement 10MWT assessed with pt ambulating at a speed of 0.69 m/s, which is below age and gender matched norms of 1.3 m/s. Pt receives a score of 23/56 on the Joseph, which is below the cut off of 45/56. Refer to note from earlier today for full discharge summary.                           IRF PT Goals      Most Recent Value   Bed Mobility Goal 1    Activity/Assistive Device sit to supine/supine to sit, rolling to left, rolling to right at 01/11/2022 1037   [place] supervision required at 01/11/2022 1037   Time Frame short-term goal (STG), 1 week at 01/11/2022 1037   Bed Mobility Goal 2    Activity/Assistive Device sit to supine/supine to sit, rolling to left, rolling to right at 01/11/2022 1037   [place] modified independence at 01/11/2022 1037   Time Frame long-term goal (LTG), 21 days or less at 01/11/2022 1037   Transfer Goal 1    Activity/Assistive Device sit-to-stand/stand-to-sit, stand pivot, no assistive device at 01/11/2022 1037   [place] minimum assist (75% or more patient effort)  [steadying assist] at 01/11/2022 1037   Time Frame short-term goal (STG), 1 week at 01/11/2022 1037   Transfer Goal 2    Activity/Assistive Device sit-to-stand/stand-to-sit, bed-to-chair/chair-to-bed, car transfer, stand pivot, no assistive device at 01/11/2022 1037   [place] supervision required at 01/11/2022 1037   Time Frame long-term goal (LTG), 21 days or less at 01/11/2022 1037   Gait/Walking Locomotion Goal 1    Activity/Assistive  Device gait (walking locomotion), no assistive device at 01/11/2022 1037   Distance 100 feet at 01/11/2022 1037   [place] minimum assist (75% or more patient effort) at 01/11/2022 1037   Time Frame short-term goal (STG), 1 week at 01/11/2022 1037   Gait/Walking Locomotion Goal 2    Activity/Assistive Device gait (walking locomotion), no assistive device at 01/11/2022 1037   Distance 200 feet at 01/11/2022 1037   [place] supervision required at 01/11/2022 1037   Time Frame long-term goal (LTG), 21 days or less at 01/11/2022 1037   Stairs Goal 1    Activity/Assistive Device ascending stairs, descending stairs, using handrail, left, using handrail, right at 01/12/2022 1327   Number of Stairs 8 at 01/12/2022 1327   [place] minimum assist (75% or more patient effort) at 01/12/2022 1327   Time Frame short-term goal (STG), 1 week at 01/12/2022 1327   Stairs Goal 2    Activity/Assistive Device ascending stairs, descending stairs, using handrail, left, using handrail, right at 01/12/2022 1327   Number of Stairs 12 at 01/12/2022 1327   [place] minimum assist (75% or more patient effort)  [steadying assist] at 01/12/2022 1327   Time Frame long-term goal (LTG), 21 days or less at 01/12/2022 1327

## 2022-01-15 NOTE — PROGRESS NOTES
Patient: Lisa Mast  Location: [facility] [address]  MRN: [id number]  Today's date: 1/15/2022    History of Present Illness  Lisa is a 66 y.o. female admitted on 1/10/2022 with Brain metastases (CMS/HCC) [C79.31]. Principal problem is Brain metastases (CMS/HCC).    66 yy.o. 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, radiation pneumonitis, esophageal stricture, and heart murmur presents for POV s/p SOC for resection of metastasis on 11/22/2021 presents with persistent headaches and worsening gait instability found to have hydrocephalus, pseudomeningocele, and interval increase of bleeding into resection cavity and is now s/p RF VPS, Delta 1.0 (1/6)     Neurosurgery note 1/10:   #Left cerebellar brain mass s/p SOC (11/22/2021) c/b hydrocephalus, pseudomeningocele, resection cavity hemorrhage now s/p RF VPS, Delta 1.0 (1/6)    #Post op Vision changes  -Post op (11/22): double / blurry vison, left eye ptosis, left eye nystagmus  -Seen by Ophthalmology last admission, scheduled for outpatient follow up in February    Past Medical History  Lisa has a past medical history of Hypertension, Lung cancer (CMS/HCC), and Lung mass.      SLP Pain    Date/Time Pain Type Location Rating: Rest Interventions Who   01/15/22 1135 Pain Assessment head 5 diversional activity provided CWB   01/15/22 1155 -- head -- relaxation techniques promoted CWB          Prior Living Environment      Most Recent Value   People in Home spouse   Current Living Arrangements home   Living Environment Comment 2STH. Pt resides on 1st floor   Number of Stairs, Main Entrance 1   Stair Railings, Main Entrance none   Stairs Comment, Main Entrance Reports  would assist PTA   Location, Patient Bedroom first (main) floor   Location, Bathroom first (main) floor   Bathroom Access Comment Walk in shower with small threshold. Has shower chair and grab bars. Grab bars by toilet          Prior Level of Function       "Most Recent Value   Dominant Hand right   Ambulation assistive person   Transferring assistive person   Toileting assistive person   Bathing independent   Dressing independent  [ setup clothing]   Prior Level of Function Comment CGA/Taye from spouse. Pt lives with  in [place], enjoys taking walks and playing tennis,  Pt is health  and works full time,  Pt shares responsibility of managing household finances and scheduling calendars.  They own property in [place] which pt helps to manage   Assistive Device Currently Used at Home grab bar, shower chair, other (see comments)  [transport chair]           IRF SLP Evaluation and Treatment - 01/15/22 1135        SLP Time Calculation    Start Time 1130     Stop Time 1200     Time Calculation (min) 30 min        Session Details    Document Type discharge evaluation     Mode of Treatment speech language pathology;individual therapy        General Information    Patient Profile Reviewed yes     General Observations of Patient flat affect; particpating throughout        Swallowing Intervention    Dysphagia/Swallowing Interventions monitor tolerance of;current diet without evidence of aspiration;compensatory swallowing strategies;oral therapeutic exercise program     Oral Therapeutic Exercise Program lingual exercise, focus on bolus manipulation;lingual exercise, focus on elevation;lingual exercise, focus on lateralization   OMEs completed x 5 each; written list of tartgeted exercises for I use provided    Comment, Swallowing Interventions pt refused all Regular diet items on lunch tray reporting that they are \"too difficult to swallow' today; Discussed option of downgrading diet but pt decided that it would be best to self-select foods that she felt she would be able to eat (ex. at times she may be able to eat cookie or poptart which are on Regular diet and she would like to continue to eat them when she has the appetite for them); Discussed how " "items would be prepared on a SBS6 diet and observed pt as she ate a SBS6 chicken sandwich entree.  Pt agreed that this texture level was easier for her to manage without sensation of food being \"stuck\" in her throat.  Reviewed handout provided yesterday describing aspiration precautions and safe swallow strategies including small, single bites/sips and liquids washes until sensation of retention eliminated.  Observed pt using these strategies initially with mod cues and then Carmela as meal progressed; pt with coughing after the swallow when multiple, consecutive sips of liquid taken; discussed that coughing is sx of aspiration and risk of aspiration can be reduced with consistent use of swallow strategies. Pt stated agreement and understanding. Pt reports she has scheduled outpatient appt for [facility] on 1/19.        Daily Progress Summary (SLP)    Daily Outcome Statement Pt refused Regular textured items during lunch but did well with SBS6 entree and thin liquids with min cues for use of swallow strategies; inconsistent sxs of aspiration eliminated with use of swallow strategies; Pt prepared for discharge tomorrow following family training session        Therapy Plan Review/Discharge Plan (SLP)    SLP Recommended Discharge Disposition home with home health     Therapy Plan Review (SLP) care plan/treatment goals reviewed;risks/benefits reviewed;participants included;patient;participants voiced agreement with care plan     Demonstrates Need for Referral to Another Service (SLP) --   recommend VFSS for objective measure of pharyngeal swallow to guide ongoing Speech therapy in home setting       Discharge Summary (SLP)    Outcomes Achieved Upon Discharge (SLP) progress was made toward goals     Discharge Summary Statement (SLP) on initial evaluation, pt presented with mild oropharyngeal dysphagia (initial eval limited by decreased intake) mild dysarthria and mild cognitive-linguisitc deficits characterized by decreased " attention,memory and reasoning/problem solving; Therapy focused primarily on addressing motor speech and swallowing goals and at discharge pt is functioning at following level; Pt's speech continues to be characterized by imprecise articulation but quality of speech improves with min-mod cues to use reduced rate, with emphasized sounds and syllable boundaries; As pt's PO intake improved, pt found to have suspected moderate oropharyngeal dysphagia characterized by prolonged but effective mastication, delayed oral transfer/swallow initiation and weak laryngeal elevation with suspected pharyngeal retention [place] with increased textures; Pt does best with SBS6 solids but discharge diet remains Regular and thin liquids to allow pt access to preferred foods for pleasure. Pt is using swallow strategies to minimize risks/sxs of aspiration. She uses small, single bites, using multiple swallows and liquid washes to clear before taking another bite. Pt also uses small, single cup sips (no straws) with thin liquids.     Transfer to Another Level of Care or Facility (SLP) recommend therapy via home health                 Education Documentation  Eating and Swallowing Adaptations, taught by Viktoria Beebe CCC-SLP at 1/15/2022  4:23 PM.  Learner: Patient  Readiness: Acceptance  Method: Explanation, Handout  Response: Verbalizes Understanding  Comment: education handouts reviewed and practiced re: swallow strategies and oral motor exercises          IRF SLP Goals      Most Recent Value   Motor Speech/Voice Goal 1    Motor Speech/Voice Goal 1 use strategies for articulatory precision in simple to mod level speech tasks x 80% mod cues at 01/11/2022 0915   Time Frame short-term goal (STG), 1 week at 01/11/2022 0915   Motor Speech/Voice Goal 2    Motor Speech/Voice Goal 2 use strategies for articulatory precision in mod to high level speech tasks x 90% min cues at 01/11/2022 0915   Time Frame long-term goal (LTG), 3 weeks at  01/11/2022 0915   Oral Nutrition/Hydration Goal 1    Activity effective/safe/independent, oral nutrition/hydration, use of swallowing techniques  [tolerate regular and thins with use of small bites/sips strategy x 90% min cues] at 01/11/2022 0915   Time Frame short-term goal (STG), 1 week at 01/11/2022 0915   Oral Nutrition/Hydration Goal 2    Activity effective/safe/independent  [use swallow strategies x 100%Carmela] at 01/11/2022 0915   Time Frame long-term goal (LTG), 3 weeks at 01/11/2022 0915   Attention Goal 1    Activity maintain focused/sustained attention, perform selective attention tasks, perform alternating attention tasks, for 10 minutes  [mod level tasks] at 01/11/2022 0915   [place]/Cues with moderate, verbal cues/redirection at 01/11/2022 0915   Time Frame short-term goal (STG), 1 week at 01/11/2022 0915   Attention Goal 2    Activity maintain focused/sustained attention, perform selective attention tasks, perform alternating attention tasks, for 20-30 minutes at 01/11/2022 0915   [place]/Cues independently at 01/11/2022 0915   Time Frame long-term goal (LTG), 3 weeks at 01/11/2022 0915   Executive Function Goal 1    Activity abstract thinking tasks, insight/awareness of deficits, organization/sequencing tasks, planning/decision-making tasks, problem-solving/reasoning tasks  [mod level] at 01/11/2022 0915   [place]/Accuracy with 80% accuracy, with moderate, verbal cues/redirection at 01/11/2022 0915   Time Frame short-term goal (STG), 1 week at 01/11/2022 0915   Executive Function Goal 2    Activity abstract thinking tasks, insight/awareness of deficits, organization/sequencing tasks, planning/decision-making tasks, problem-solving/reasoning tasks at 01/11/2022 0915   [place]/Accuracy with 90% accuracy, with minimum, verbal cues/redirection at 01/11/2022 0915   Time Frame long-term goal (LTG), 3 weeks at 01/11/2022 0915   Memory Goal 1    Activity short-term memory tasks, working  memory tasks, prospective memory tasks, recall recent events  [mod level] at 01/11/2022 0915   [place]/Accuracy with 80% accuracy, moderate cues for use of strategies at 01/11/2022 0915   Memory Goal 2    Activity short-term memory tasks, working memory tasks, prospective memory tasks, recall recent events at 01/11/2022 0915   [place]/Accuracy with 90% accuracy, minimal cues for use of strategies at 01/11/2022 0915   Time Frame long-term goal (LTG), 3 weeks at 01/11/2022 0915

## 2022-01-15 NOTE — NURSING NOTE
Madhu at bedside. Lovenox shot education provided to patient and  for discharge tomorrow. Spoke to  about need for hi insertion- states that this afternoon he organized a nurse they know coming to his house to complete straight caths every day and she will have the supplies available to do so. Educated on the risks of not emptying enough and that Lisa has needed cathed twice today. Lisa continues to refuse hi insertion here. Education continuously provided.

## 2022-01-15 NOTE — PROGRESS NOTES
Patient: Lisa Mast  [facility]  MRN: [id number]  Today's date: 1/15/2022    History of Present Illness  Lisa is a 66 y.o. female admitted on 1/10/2022 with Brain metastases (CMS/HCC) [C79.31]. Principal problem is Brain metastases (CMS/HCC).    66 yy.o. 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, radiation pneumonitis, esophageal stricture, and heart murmur presents for POV s/p SOC for resection of metastasis on 11/22/2021 presents with persistent headaches and worsening gait instability found to have hydrocephalus, pseudomeningocele, and interval increase of bleeding into resection cavity and is now s/p RF VPS, Delta 1.0 (1/6)     Neurosurgery note 1/10:   #Left cerebellar brain mass s/p SOC (11/22/2021) c/b hydrocephalus, pseudomeningocele, resection cavity hemorrhage now s/p RF VPS, Delta 1.0 (1/6)    #Post op Vision changes  -Post op (11/22): double / blurry vison, left eye ptosis, left eye nystagmus  -Seen by Ophthalmology last admission, scheduled for outpatient follow up in February    Past Medical History  Lisa has a past medical history of Hypertension, Lung cancer (CMS/HCC), and Lung mass.         01/15/22 1305   Pain/Comfort/Sleep   Pain Charting Type Pain Assessment   Preferred Pain Scale number (Numeric Rating Pain Scale)   (0-10) Pain Rating: Rest 5   Pain Body Location head   Pain Management Interventions diversional activity provided   Vital Signs   Heart Rate 63   Heart Rate Source Monitor   SpO2 99 %   Patient Activity At rest   Oxygen Therapy None (Room air)   BP (!) 166/82   BP Location Left upper arm   BP Method Automatic   Patient Position Sitting   Patient Observation   Observations Pre-PT session        01/15/22 1356   Pain/Comfort/Sleep   Pain Charting Type Pain Reassessment;Post Activity   Preferred Pain Scale number (Numeric Rating Pain Scale)   (0-10) Pain Rating: Rest 5   (0-10) Pain Rating: Activity 7   Pain Body Location  head   Pain Management Interventions position adjusted  (Pt stating she would ask NSG for pain meds upon return to room.)   Vital Signs   Heart Rate 66   Heart Rate Source Monitor   BP (!) 164/80   BP Location Left upper arm   BP Method Automatic   Patient Position Sitting   Patient Observation   Observations Post-PT session       Prior Living Environment      Most Recent Value   People in Home spouse   Current Living Arrangements home   Living Environment Comment 2STH. Pt resides on 1st floor   Number of Stairs, Main Entrance 1   Stair Railings, Main Entrance none   Stairs Comment, Main Entrance Reports  would assist PTA   Location, Patient Bedroom first (main) floor   Location, Bathroom first (main) floor   Bathroom Access Comment Walk in shower with small threshold. Has shower chair and grab bars. Grab bars by toilet          Prior Level of Function      Most Recent Value   Dominant Hand right   Ambulation assistive person   Transferring assistive person   Toileting assistive person   Bathing independent   Dressing independent  [ setup clothing]   Prior Level of Function Comment CGA/Taye from spouse. Pt lives with  in [place], enjoys taking walks and playing tennis,  Pt is health  and works full time,  Pt shares responsibility of managing household finances and scheduling calendars.  They own property in [place] which pt helps to manage   Assistive Device Currently Used at Home grab bar, shower chair, other (see comments)  [transport chair]               01/15/22 1304   PT Time Calculation   Start Time 1300   Stop Time 1400   Time Calculation (min) 60 min   Session Details   Document Type discharge evaluation   Mode of Treatment physical therapy;individual therapy   General Information   General Observations of Patient Pt received in gym, pleasant and agreeable to PT. Reporting HA.   Cognition/Psychosocial   Comment, Cognition Able to hold appropriate conversation, answer  questions accurately, and correctly follow one- and multi-step commands within session. Prep cues to reduce impulsivity with functional mobility.   Sensory Assessment (Somatosensory)   Left LE Sensory Assessment light touch awareness;light touch localization;proprioception;intact  (proprio at 1st MTP)   Right LE Sensory Assessment light touch awareness;light touch localization;proprioception;intact  (proprio at 1st MTP)   Range of Motion (ROM)   Left Lower Extremity (ROM) left LE ROM is WFL except   Ankle, Left (ROM) DF to neutral   Right Lower Extremity (ROM) right LE ROM is WFL except   Ankle, Right (ROM) DF to neutral   Strength (Manual Muscle Testing)   Left Lower Extremity Strength hip;left LE strength is WFL   Hip, Left (Strength) Grossly 4+/5 all planes   Knee, Left (Strength) Grossly 4+/5 all planes   Ankle, Left (Strength) Grossly 4+/5 all planes   Right Lower Extremity Strength right LE strength is WFL   Hip, Right (Strength) Grossly 4+/5 all planes   Knee, Right (Strength) Grossly 4+/5 all planes   Ankle, Right (Strength) Grossly 4+/5 all planes   Sit to Stand Transfer   [place], Sit to Stand Transfer close supervision   Verbal Cues safety   Assistive Device gait belt   Comment multiple trials from    Stand to Sit Transfer   [place], Stand to Sit Transfer close supervision   Verbal Cues safety   Assistive Device gait belt   Comment multiple trials from    Stand Pivot Transfer   [place], Stand Pivot/Stand Step Transfer minimum assist (75% or more patient effort)   Verbal Cues safety   Assistive Device gait belt   Comment multiple trials via amb approach with L arm over or L HHA assist+ [place] at hips for balance; increased postural sway with turning   Car Transfer   Transfer Technique stand pivot   [place], Car Transfer minimum assist (75% or more patient effort);verbal cues;nonverbal cues (demo/gesture)   Verbal Cues safety;technique;hand placement   Assistive Device gait belt  "  Comment Via amb approach with L HHA + [place] at hips for balance. Demo provided prior, however pt still reaching for car door and stepping into car versus sitting on seat then lifting legs in. VC for correct technique provided.   Gait Training   [place], Gait minimum assist (75% or more patient effort)   Assistive Device gait belt   Distance in Feet 300 feet   Pattern (Gait) step-through   Deviations/Abnormal Patterns (Gait) base of support, narrow;gait speed decreased;weight shifting decreased   Left Sided Gait Deviations heel strike decreased   Right Sided Gait Deviations heel strike decreased   [place], Picking Up Object touching/steadying assist  (gait belt and steadyA for balance)   Comment (Gait/Stairs) 10', 50' with two turns, and 300' x1.  Largely L arm over assist and [place] at hips for balance. Lateral postural sway noted throughout R>L. Also trialed L HHA for short distances throughout session with min increase in postural sway but no increase in assistance required to maintain balance/safety.   Curb Negotiation   [place] minimum assist (75% or more patient effort);verbal cues   Assistive Device gait belt   Curb Height 6 inches   Comment Up/down 2\"+6\" curb x2: First with L HHA + modA at hips for balance. Then with [place] at hips only. VC for technique. Pt with posterolateral sway on ascent and R lateral sway upon descent. Initially with SBA for safety progressing to no SBA. Improved stability with assist at hips only.   Rough/Uneven Surface Gait Skills   [place] minimum assist (75% or more patient effort)   Assistive Device gait belt   Distance in Feet 50 feet   Comment 50' over low pile carpet with L arm over assist and [place] at hips for balance. Min increase in postural sway versus over level tile but no increased assistance required.   Sloped Surface Gait Skills   [place] moderate assist (50-74% patient effort);verbal cues   Assistive Device gait belt   Distance in Feet 5 feet "   Comment Up/down 5' non ADA compliant ramp with L HHA and modA at hips for balance. VC for technique.   Stairs Training   [place], Stairs touching/steadying assist;verbal cues   Assistive Device railing   Handrail Location (Stairs) both sides   Number of Stairs 12   Stair Height 6 inches   Ascending Stairs Technique step-over-step   Descending Stairs Technique step-over-step   Comment Up/down 4 stairs x3 with self-selected stepping pattern and steadyA at hips for balance. VC for BUE advancement on railing.   Wheelchair Mobility/Management   Comment, Wheelchair Mobility Not needed at time of DC.   Safety Issues, Functional Mobility   Safety Issues Affecting Function (Mobility) impulsivity;insight into deficits/self-awareness;judgment;problem-solving   Impairments Affecting Function (Mobility) balance;cognition;postural/trunk control;range of motion (ROM);strength   Cognitive Impairments, Mobility Safety/Performance impulsivity;insight into deficits/self-awareness;judgment;problem-solving/reasoning   Comment, Safety Issues/Impairments (Mobility) Mild impulsive initiation noted at times. Prep cues to reduce impulsivity with functional mobility.   Balance   Static Sitting Balance WFL   Dynamic Sitting Balance mild impairment   Static Standing Balance mild impairment   Dynamic Standing Balance mild impairment   Motor Skills   Coordination bilateral;lower extremity;WFL;heel to shin;other (see comments)  (ROSS at ankles)   Functional Endurance Fair+; able to ambulate for 300' with min fatigue   Muscle Tone bilateral;lower extremity(s);WNL   Postural Deviations   Postural Deviations low back   Head and Neck forward head   Shoulder left shoulder forward;right shoulder forward   Upper Back abducted scapulae;kyphosis   Low Back flattened;lordosis   Pelvis posterior pelvic tilt   Bed Mobility Goal 1   Progress/Outcome goal met   Bed Mobility Goal 2   Progress/Outcome goal not met;good progress toward goal   Transfer Goal 1    Progress/Outcome goal met   Transfer Goal 2   Progress/Outcome goal partially met;good progress toward goal   Gait/Walking Locomotion Goal 1   Progress/Outcome goal met   Gait/Walking Locomotion Goal 2   Progress/Outcome goal not met;good progress toward goal   Stairs Goal 1   Progress/Outcome goal met   Stairs Goal 2   Progress/Outcome goal met   Discharge Summary (PT)   Outcomes Achieved/Progress Made Upon Discharge (PT) goals partially achieved prior to discharge   Transfer to Another Level of Care or Facility (PT) recommend continued therapy following discharge;recommend therapy via home health   Discharge Summary Statement (PT) Pt is a 66 y.o. F with brain mets admitted 1/10/22 s/p VPS placement with h/o L cerebellar brain mass resection (11/2021).  She has progressed to Cl S for STS, min A for SPT and ambulation up to 200’ with L arm over assist or L HHA and gait belt for safety.  SteadyA for 12 stairs with BHR and step-over pattern and jo for up/down single curb step.  See PT treatment note on 1/15 for updated bed mobility assist levels, as well as [place] and 10MWT values.  Goals partially met prior to DC, with hands on assist required due to continued balance deficits.  FT to be completed with  and niece 1/16/21.  Recommend continued skilled PT services upon DC home 1/16/2021.          Education Documentation  Harm Prevention, taught by Sandrine Whaley, PT at 1/15/2022  4:44 PM.  Learner: Patient  Readiness: Acceptance  Method: Explanation  Response: Verbalizes Understanding, Needs Reinforcement  Comment: Recommend reinforcement via FTR 1/16 for hands on assist + gait belt utilized for all mobility to increase safety/decrease risk of falls.    Fall Prevention, taught by Sandrine Whaley, PT at 1/15/2022  4:44 PM.  Learner: Patient  Readiness: Acceptance  Method: Explanation  Response: Verbalizes Understanding, Needs Reinforcement  Comment: Recommend reinforcement via FTR 1/16 for hands on assist + gait  belt utilized for all mobility to increase safety/decrease risk of falls.          IRF PT Goals      Most Recent Value   Bed Mobility Goal 1    Activity/Assistive Device sit to supine/supine to sit, rolling to left, rolling to right at 01/11/2022 1037   [place] supervision required at 01/11/2022 1037   Time Frame short-term goal (STG), 1 week at 01/11/2022 1037   Progress/Outcome goal met at 01/15/2022 1304   Bed Mobility Goal 2    Activity/Assistive Device sit to supine/supine to sit, rolling to left, rolling to right at 01/11/2022 1037   [place] modified independence at 01/11/2022 1037   Time Frame long-term goal (LTG), 21 days or less at 01/11/2022 1037   Progress/Outcome goal not met, good progress toward goal at 01/15/2022 1304   Transfer Goal 1    Activity/Assistive Device sit-to-stand/stand-to-sit, stand pivot, no assistive device at 01/11/2022 1037   [place] minimum assist (75% or more patient effort)  [steadying assist] at 01/11/2022 1037   Time Frame short-term goal (STG), 1 week at 01/11/2022 1037   Progress/Outcome goal met at 01/15/2022 1304   Transfer Goal 2    Activity/Assistive Device sit-to-stand/stand-to-sit, bed-to-chair/chair-to-bed, car transfer, stand pivot, no assistive device at 01/11/2022 1037   [place] supervision required at 01/11/2022 1037   Time Frame long-term goal (LTG), 21 days or less at 01/11/2022 1037   Progress/Outcome goal partially met, good progress toward goal at 01/15/2022 1304   Gait/Walking Locomotion Goal 1    Activity/Assistive Device gait (walking locomotion), no assistive device at 01/11/2022 1037   Distance 100 feet at 01/11/2022 1037   [place] minimum assist (75% or more patient effort) at 01/11/2022 1037   Time Frame short-term goal (STG), 1 week at 01/11/2022 1037   Progress/Outcome goal met at 01/15/2022 1304   Gait/Walking Locomotion Goal 2    Activity/Assistive Device gait (walking locomotion), no assistive device at 01/11/2022 1037    Distance 200 feet at 01/11/2022 1037   [place] supervision required at 01/11/2022 1037   Time Frame long-term goal (LTG), 21 days or less at 01/11/2022 1037   Progress/Outcome goal not met, good progress toward goal at 01/15/2022 1304   Stairs Goal 1    Activity/Assistive Device ascending stairs, descending stairs, using handrail, left, using handrail, right at 01/12/2022 1327   Number of Stairs 8 at 01/12/2022 1327   [place] minimum assist (75% or more patient effort) at 01/12/2022 1327   Time Frame short-term goal (STG), 1 week at 01/12/2022 1327   Progress/Outcome goal met at 01/15/2022 1304   Stairs Goal 2    Activity/Assistive Device ascending stairs, descending stairs, using handrail, left, using handrail, right at 01/12/2022 1327   Number of Stairs 12 at 01/12/2022 1327   [place] minimum assist (75% or more patient effort)  [steadying assist] at 01/12/2022 1327   Time Frame long-term goal (LTG), 21 days or less at 01/12/2022 1327   Progress/Outcome goal met at 01/15/2022 1304

## 2022-01-15 NOTE — PLAN OF CARE
Plan of Care Review  Plan of Care Reviewed With: patient  Progress: improving  Outcome Summary: No c/o any pain or discomfort. PVR 556cc with bladder scan. St cath by supervisor with 400cc in bag. Pt tolerated well. Continue with NS overnight. Vitals stable. Sleeping comfortably at this time. SR x 4 and bed alarm on for safety.

## 2022-01-16 ENCOUNTER — APPOINTMENT (EMERGENCY)
Dept: RADIOLOGY | Facility: HOSPITAL | Age: 67
End: 2022-01-16
Attending: EMERGENCY MEDICINE
Payer: COMMERCIAL

## 2022-01-16 ENCOUNTER — APPOINTMENT (INPATIENT)
Dept: OCCUPATIONAL THERAPY | Facility: REHABILITATION | Age: 67
DRG: 949 | End: 2022-01-16
Payer: COMMERCIAL

## 2022-01-16 ENCOUNTER — HOSPITAL ENCOUNTER (EMERGENCY)
Facility: HOSPITAL | Age: 67
Discharge: HOME | End: 2022-01-16
Attending: EMERGENCY MEDICINE
Payer: COMMERCIAL

## 2022-01-16 ENCOUNTER — APPOINTMENT (INPATIENT)
Dept: PHYSICAL THERAPY | Facility: REHABILITATION | Age: 67
DRG: 949 | End: 2022-01-16
Payer: COMMERCIAL

## 2022-01-16 VITALS
OXYGEN SATURATION: 97 % | TEMPERATURE: 98 F | HEART RATE: 75 BPM | SYSTOLIC BLOOD PRESSURE: 133 MMHG | DIASTOLIC BLOOD PRESSURE: 95 MMHG | RESPIRATION RATE: 16 BRPM

## 2022-01-16 VITALS
WEIGHT: 121.7 LBS | RESPIRATION RATE: 18 BRPM | HEIGHT: 69 IN | BODY MASS INDEX: 18.02 KG/M2 | SYSTOLIC BLOOD PRESSURE: 161 MMHG | DIASTOLIC BLOOD PRESSURE: 86 MMHG | OXYGEN SATURATION: 98 % | TEMPERATURE: 97.5 F | HEART RATE: 67 BPM

## 2022-01-16 DIAGNOSIS — R13.10 DYSPHAGIA, UNSPECIFIED TYPE: Primary | ICD-10-CM

## 2022-01-16 LAB
ALBUMIN SERPL-MCNC: 3.8 G/DL (ref 3.4–5)
ALP SERPL-CCNC: 49 IU/L (ref 35–126)
ALT SERPL-CCNC: 18 IU/L (ref 11–54)
ANION GAP SERPL CALC-SCNC: 10 MEQ/L (ref 3–15)
AST SERPL-CCNC: 18 IU/L (ref 15–41)
BACTERIA URNS QL MICRO: ABNORMAL /HPF
BASOPHILS # BLD: 0.02 K/UL (ref 0.01–0.1)
BASOPHILS NFR BLD: 0.2 %
BILIRUB SERPL-MCNC: 0.9 MG/DL (ref 0.3–1.2)
BILIRUB UR QL STRIP.AUTO: NEGATIVE MG/DL
BUN SERPL-MCNC: 15 MG/DL (ref 8–20)
CALCIUM SERPL-MCNC: 9.4 MG/DL (ref 8.9–10.3)
CHLORIDE SERPL-SCNC: 93 MEQ/L (ref 98–109)
CLARITY UR REFRACT.AUTO: CLEAR
CO2 SERPL-SCNC: 28 MEQ/L (ref 22–32)
COLOR UR AUTO: YELLOW
CREAT SERPL-MCNC: 0.6 MG/DL (ref 0.6–1.1)
DIFFERENTIAL METHOD BLD: ABNORMAL
EOSINOPHIL # BLD: 0.02 K/UL (ref 0.04–0.36)
EOSINOPHIL NFR BLD: 0.2 %
ERYTHROCYTE [DISTWIDTH] IN BLOOD BY AUTOMATED COUNT: 18.1 % (ref 11.7–14.4)
GFR SERPL CREATININE-BSD FRML MDRD: >60 ML/MIN/1.73M*2
GLUCOSE BLD-MCNC: 104 MG/DL (ref 70–99)
GLUCOSE SERPL-MCNC: 110 MG/DL (ref 70–99)
GLUCOSE UR STRIP.AUTO-MCNC: NEGATIVE MG/DL
HCT VFR BLDCO AUTO: 40.1 % (ref 35–45)
HGB BLD-MCNC: 13.3 G/DL (ref 11.8–15.7)
HGB UR QL STRIP.AUTO: NEGATIVE
HYALINE CASTS #/AREA URNS LPF: ABNORMAL /LPF
IMM GRANULOCYTES # BLD AUTO: 0.14 K/UL (ref 0–0.08)
IMM GRANULOCYTES NFR BLD AUTO: 1.6 %
KETONES UR STRIP.AUTO-MCNC: NEGATIVE MG/DL
LEUKOCYTE ESTERASE UR QL STRIP.AUTO: ABNORMAL
LYMPHOCYTES # BLD: 0.65 K/UL (ref 1.2–3.5)
LYMPHOCYTES NFR BLD: 7.2 %
MCH RBC QN AUTO: 29.4 PG (ref 28–33.2)
MCHC RBC AUTO-ENTMCNC: 33.2 G/DL (ref 32.2–35.5)
MCV RBC AUTO: 88.5 FL (ref 83–98)
MONOCYTES # BLD: 0.73 K/UL (ref 0.28–0.8)
MONOCYTES NFR BLD: 8.1 %
NEUTROPHILS # BLD: 7.44 K/UL (ref 1.7–7)
NEUTS SEG NFR BLD: 82.7 %
NITRITE UR QL STRIP.AUTO: NEGATIVE
NRBC BLD-RTO: 0 %
PDW BLD AUTO: 9.5 FL (ref 9.4–12.3)
PH UR STRIP.AUTO: 7 [PH]
PLATELET # BLD AUTO: 255 K/UL (ref 150–369)
POCT TEST: ABNORMAL
POTASSIUM SERPL-SCNC: 3.6 MEQ/L (ref 3.6–5.1)
PROT SERPL-MCNC: 6.8 G/DL (ref 6–8.2)
PROT UR QL STRIP.AUTO: NEGATIVE
RBC # BLD AUTO: 4.53 M/UL (ref 3.93–5.22)
RBC #/AREA URNS HPF: ABNORMAL /HPF
SODIUM SERPL-SCNC: 131 MEQ/L (ref 136–144)
SP GR UR REFRACT.AUTO: 1.01
SQUAMOUS URNS QL MICRO: 1 /HPF
UROBILINOGEN UR STRIP-ACNC: 0.2 EU/DL
WBC # BLD AUTO: 9 K/UL (ref 3.8–10.5)
WBC #/AREA URNS HPF: ABNORMAL /HPF

## 2022-01-16 PROCEDURE — 81003 URINALYSIS AUTO W/O SCOPE: CPT | Performed by: PHYSICIAN ASSISTANT

## 2022-01-16 PROCEDURE — G1004 CDSM NDSC: HCPCS

## 2022-01-16 PROCEDURE — 63700000 HC SELF-ADMINISTRABLE DRUG: Performed by: INTERNAL MEDICINE

## 2022-01-16 PROCEDURE — 81001 URINALYSIS AUTO W/SCOPE: CPT | Performed by: PHYSICIAN ASSISTANT

## 2022-01-16 PROCEDURE — 97530 THERAPEUTIC ACTIVITIES: CPT | Mod: GP

## 2022-01-16 PROCEDURE — 63600000 HC DRUGS/DETAIL CODE: Performed by: PHYSICAL MEDICINE & REHABILITATION

## 2022-01-16 PROCEDURE — 85025 COMPLETE CBC W/AUTO DIFF WBC: CPT | Performed by: PHYSICIAN ASSISTANT

## 2022-01-16 PROCEDURE — 63700000 HC SELF-ADMINISTRABLE DRUG: Performed by: PHYSICAL MEDICINE & REHABILITATION

## 2022-01-16 PROCEDURE — 18000000 HC LEAVE OF ABSENCE

## 2022-01-16 PROCEDURE — 87086 URINE CULTURE/COLONY COUNT: CPT | Performed by: PHYSICIAN ASSISTANT

## 2022-01-16 PROCEDURE — 97116 GAIT TRAINING THERAPY: CPT | Mod: GP

## 2022-01-16 PROCEDURE — 97530 THERAPEUTIC ACTIVITIES: CPT | Mod: GO,59

## 2022-01-16 PROCEDURE — 71045 X-RAY EXAM CHEST 1 VIEW: CPT

## 2022-01-16 PROCEDURE — 80053 COMPREHEN METABOLIC PANEL: CPT | Performed by: PHYSICIAN ASSISTANT

## 2022-01-16 PROCEDURE — 92526 ORAL FUNCTION THERAPY: CPT | Mod: GN

## 2022-01-16 PROCEDURE — 99284 EMERGENCY DEPT VISIT MOD MDM: CPT | Mod: 25

## 2022-01-16 PROCEDURE — 36415 COLL VENOUS BLD VENIPUNCTURE: CPT | Performed by: PHYSICIAN ASSISTANT

## 2022-01-16 RX ADMIN — PANTOPRAZOLE SODIUM 40 MG: 40 TABLET, DELAYED RELEASE ORAL at 08:04

## 2022-01-16 RX ADMIN — DOXYCYCLINE HYCLATE 100 MG: 100 TABLET, COATED ORAL at 08:04

## 2022-01-16 RX ADMIN — DEXAMETHASONE 4 MG: 4 TABLET ORAL at 12:07

## 2022-01-16 RX ADMIN — ACETAMINOPHEN ORAL SOLUTION 650 MG: 650 SOLUTION ORAL at 12:07

## 2022-01-16 RX ADMIN — SENNOSIDES 2 TABLET: 8.6 TABLET, FILM COATED ORAL at 08:04

## 2022-01-16 RX ADMIN — DOCUSATE SODIUM 100 MG: 100 CAPSULE, LIQUID FILLED ORAL at 08:04

## 2022-01-16 RX ADMIN — CHLORHEXIDINE GLUCONATE 0.12% ORAL RINSE 15 ML: 1.2 LIQUID ORAL at 08:04

## 2022-01-16 RX ADMIN — DEXAMETHASONE 4 MG: 4 TABLET ORAL at 00:06

## 2022-01-16 RX ADMIN — DEXAMETHASONE 4 MG: 4 TABLET ORAL at 05:16

## 2022-01-16 ASSESSMENT — ENCOUNTER SYMPTOMS
SHORTNESS OF BREATH: 0
FEVER: 0
BACK PAIN: 0
COLOR CHANGE: 0
DYSURIA: 0
SEIZURES: 0
PALPITATIONS: 0
COUGH: 0
CONFUSION: 0
CHILLS: 0
ARTHRALGIAS: 0
SORE THROAT: 0
EYE PAIN: 0
AGITATION: 0
HEMATURIA: 0
ABDOMINAL PAIN: 0
VOMITING: 0
HEADACHES: 1

## 2022-01-16 NOTE — PLAN OF CARE
Plan of Care Review  Plan of Care Reviewed With: patient  Progress: improving  Outcome Summary: AAOx4, cathed Q6, tolerated well, self-repostioned throughout shift, denies pain, free from falls, refused SCDs, left PIV WNL. Presently sleeping in bed, no S/S distress or discomfort noted, call-bell within reach.

## 2022-01-16 NOTE — ED PROVIDER NOTES
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 She presents today for evaluation of acute dysphagia. She is currently at [facility] preparing for discharge home. This morning she was unable to swallow a tylenol tablet. She feels as if it is stuck in her upper esophagus. Initially she had trouble tolerating her secretions but this is improved. She was able to take a small sip of water for me without regurgitation. She reports it feels difficult to swallow. She also reports headache 10/10 today. Chronic headaches but today is worse. No other focal neurologic deficits. Recently on rocephin for presumed UTI although no culture obtained.      History provided by:  Patient   used: No          Patient History   PAST HISTORY     Reviewed from Nursing Triage:       Past Medical History:   Diagnosis Date   • Hypertension    • Lung cancer (CMS/HCC)    • Lung mass        Past Surgical History:   Procedure Laterality Date   • APPENDECTOMY     • BRAIN SURGERY      cerebellar mass resection   • CATARACT EXTRACTION, BILATERAL         History reviewed. No pertinent family history.    Social History     Tobacco Use   • Smoking status: Former Smoker   • Smokeless tobacco: Never Used   Substance Use Topics   • Alcohol use: Yes   • Drug use: Not on file         Review of Systems   REVIEW OF SYSTEMS     Review of Systems   Constitutional: Negative for chills and fever.   HENT: Negative for ear pain and sore throat.    Eyes: Negative for pain and visual disturbance.   Respiratory: Negative for cough and shortness of breath.     Cardiovascular: Negative for chest pain and palpitations.   Gastrointestinal: Negative for abdominal pain and vomiting.   Endocrine: Negative for polyuria.   Genitourinary: Negative for dysuria and hematuria.   Musculoskeletal: Negative for arthralgias and back pain.   Skin: Negative for color change and rash.   Neurological: Positive for headaches. Negative for seizures and syncope.   Psychiatric/Behavioral: Negative for agitation and confusion.         VITALS     ED Vitals    Date/Time Temp Pulse Resp BP SpO2 [facility]   01/16/22 1546 -- 75 16 133/95 97 % RMB   01/16/22 1318 36.7 °C (98 °F) 67 18 163/90 99 % MG        Pulse Ox %: 99 % (01/16/22 1408)  Pulse Ox Interpretation: Normal (01/16/22 1408)           Physical Exam   PHYSICAL EXAM     Physical Exam  Vitals and nursing note reviewed.   Constitutional:       General: She is not in acute distress.     Appearance: She is well-developed. She is not diaphoretic.   HENT:      Head: Normocephalic and atraumatic.      Nose: Nose normal.   Eyes:      Extraocular Movements:      Right eye: Nystagmus present.      Left eye: Nystagmus present.      Conjunctiva/sclera: Conjunctivae normal.      Pupils: Pupils are equal, round, and reactive to light.   Neck:      Trachea: Trachea normal.   Cardiovascular:      Rate and Rhythm: Normal rate and regular rhythm.      Heart sounds: Normal heart sounds, S1 normal and S2 normal. No murmur heard.  Pulmonary:      Effort: Pulmonary effort is normal. No respiratory distress.      Breath sounds: Normal breath sounds. No wheezing or rales.   Chest:      Chest wall: No tenderness.   Abdominal:      General: Bowel sounds are normal. There is no distension.      Palpations: Abdomen is soft. There is no mass.      Tenderness: There is no abdominal tenderness. There is no guarding or rebound.   Musculoskeletal:      Cervical back: Neck supple.   Skin:     General: Skin is warm and dry.      Coloration: Skin is not pale.      Findings: No  rash.   Neurological:      General: No focal deficit present.      Mental Status: She is alert and oriented to person, place, and time.      Cranial Nerves: No cranial nerve deficit.      Sensory: No sensory deficit.      Motor: No weakness.      Coordination: Coordination normal.   Psychiatric:         Speech: Speech normal.         Behavior: Behavior normal. Behavior is cooperative.         Thought Content: Thought content normal.         Judgment: Judgment normal.           PROCEDURES     Procedures     DATA     Results     Procedure Component Value Units Date/Time    UA with reflex culture [[id number]]  (Abnormal) Collected: 01/16/22 1541    Specimen: Urine, Clean Catch Updated: 01/16/22 1603    Narrative:      The following orders were created for panel order UA with reflex culture.  Procedure                               Abnormality         Status                     ---------                               -----------         ------                     UA Reflex to Culture (Ma...[[id number]]  Abnormal            Final result               UA Microscopic[[id number]]               Abnormal            Final result                 Please view results for these tests on the individual orders.    UA Microscopic [[id number]]  (Abnormal) Collected: 01/16/22 1541    Specimen: Urine, Clean Catch Updated: 01/16/22 1603     RBC, Urine 0 TO 4 /HPF      WBC, Urine 0 TO 3 /HPF      Squamous Epithelial +1 /hpf      Hyaline Cast 0 TO 2 /lpf      Bacteria, Urine None Seen /HPF     UA Reflex to Culture (Macroscopic) [[id number]]  (Abnormal) Collected: 01/16/22 1541    Specimen: Urine, Clean Catch Updated: 01/16/22 5647     Color, Urine Yellow     Clarity, Urine Clear     Specific Gravity, Urine 1.015     pH, Urine 7.0     Leukocyte Esterase Trace     Nitrite, Urine Negative     Protein, Urine Negative     Glucose, Urine Negative mg/dL      Ketones, Urine Negative mg/dL      Urobilinogen, Urine 0.2 EU/dL      Bilirubin, Urine  Negative mg/dL      Blood, Urine Negative     Comment: The sensitivity of the occult blood test is equivalent to approximately 4 intact RBC/HPF.       Comprehensive metabolic panel [[id number]]  (Abnormal) Collected: 01/16/22 1455    Specimen: Blood, Venous Updated: 01/16/22 1540     Sodium 131 mEQ/L      Potassium 3.6 mEQ/L      Comment: Results obtained on plasma. Plasma Potassium values may be up to 0.4 mEQ/L less than serum values. The differences may be greater for patients with high platelet or white cell counts.        Chloride 93 mEQ/L      CO2 28 mEQ/L      BUN 15 mg/dL      Creatinine 0.6 mg/dL      Glucose 110 mg/dL      Calcium 9.4 mg/dL      AST (SGOT) 18 IU/L      ALT (SGPT) 18 IU/L      Alkaline Phosphatase 49 IU/L      Total Protein 6.8 g/dL      Comment: Test performed on plasma which typically contains approximately 0.4 g/dL more protein than serum.        Albumin 3.8 g/dL      Bilirubin, Total 0.9 mg/dL      eGFR >60.0 mL/min/1.73m*2      Anion Gap 10 mEQ/L     CBC and differential [[id number]]  (Abnormal) Collected: 01/16/22 1455    Specimen: Blood, Venous Updated: 01/16/22 1505     WBC 9.00 K/uL      RBC 4.53 M/uL      Hemoglobin 13.3 g/dL      Hematocrit 40.1 %      MCV 88.5 fL      MCH 29.4 pg      MCHC 33.2 g/dL      RDW 18.1 %      Platelets 255 K/uL      MPV 9.5 fL      Differential Type Auto     nRBC 0.0 %      Immature Granulocytes 1.6 %      Neutrophils 82.7 %      Lymphocytes 7.2 %      Monocytes 8.1 %      Eosinophils 0.2 %      Basophils 0.2 %      Immature Granulocytes, Absolute 0.14 K/uL      Neutrophils, Absolute 7.44 K/uL      Lymphocytes, Absolute 0.65 K/uL      Monocytes, Absolute 0.73 K/uL      Eosinophils, Absolute 0.02 K/uL      Basophils, Absolute 0.02 K/uL     RAINBOW LT BLUE [[id number]] Collected: 01/16/22 1455    Specimen: Blood, Venous Updated: 01/16/22 1500    RAINBOW RED [[id number]] Collected: 01/16/22 1455    Specimen: Blood, Venous Updated: 01/16/22 1500    [place]  Draw Panel [[id number]] Collected: 01/16/22 1455    Specimen: Blood, Venous Updated: 01/16/22 1500    Narrative:      The following orders were created for panel order [facility] Draw Panel.  Procedure                               Abnormality         Status                     ---------                               -----------         ------                     RAINBOW RED[[id number]]                                      In process                 RAINBOW LT BLUE[[id number]]                                  In process                 RAINBOW GOLD[[id number]]                                     In process                   Please view results for these tests on the individual orders.    RAINBOW GOLD [[id number]] Collected: 01/16/22 1455    Specimen: Blood, Venous Updated: 01/16/22 1500          Imaging Results          CT HEAD WITHOUT IV CONTRAST (Final result)  Result time 01/16/22 14:27:04    Final result                 Impression:    IMPRESSION:  No acute intracranial abnormality.  Subacute changes of right frontal approach ventricular shunt.  Chronic suboccipital craniectomy with hyperdense fourth ventricular versus  cerebellar mass likely due to metastasis given known medical history. If  clinically indicated, contrast-enhanced MRI of the brain may be considered.  Mild microangiopathic ischemic white matter disease.               Narrative:    CLINICAL HISTORY:   Headache, prior history of left upper lobe lung cancer    COMPARISON: None    CT DOSE:  One or more dose reduction techniques (e.g. automated exposure  control, adjustment of the mA and/or kV according to patient size, use of  iterative reconstruction technique) utilized for this examination.    COMMENT:   Computed tomography of the brain was performed without intravenous  contrast.    The ventricular system and cortical sulci are within normal range for age. Right  frontal approach ventricular shunt terminating in the third ventricle; trace  pneumocephalus  and intraventricular gas in the frontal horns, likely related to  recent shunt manipulation. Shunt reservoir in the right frontoparietal scalp  with right frontal and right infra-auricular stable lines.    A hyperdense lobulated mass centered in the medial left cerebellum versus fourth  ventricle measuring 2.4 x 1.6 cm (image 15). There is no upstream ventricular  dilatation. No significant adjacent vasogenic edema identified. Postsurgical  changes of occipital craniectomy.    There is no midline shift, mass effect or hemorrhage. Mild periventricular white  matter hypoattenuation.    Bilateral lens replacements. The paranasal sinuses and the mastoid air cells are  well aerated.                               X-RAY CHEST 1 VIEW (Final result)  Result time 01/16/22 13:52:12    Final result                 Impression:    IMPRESSION:  1. Fibrolinear change in the left upper lung which corresponds with a previously  demonstrated large mass from the January 2021 CT scan. The residual density in  this region may represent posttreatment change. Comparison with any more recent  chest x-rays would be useful.  2. No other evidence for acute cardiopulmonary process.             Narrative:    CLINICAL HISTORY: Aspiration    COMMENT:  Single view chest was obtained and compared with a CT of the thorax  from 01/27/2021.    There are fibrolinear changes in the left upper lung in an area of a previously  demonstrated large mass. This is more fibrolinear and could be the sequelae of  post treatment change. A tiny nodule in the periphery of the right upper lobe  likely corresponds with a calcified granuloma on the previous CT. No new lung  consolidation or pneumothorax. The costophrenic angles appear clear.                                No orders to display       Scoring tools                                 ED Course & MDM   MDM / ED COURSE / CLINICAL IMPRESSIONS / DISPO     MDM    ED Course as of 01/16/22 1629   Sun Jan 16, 2022    1358 X-RAY CHEST 1 VIEW  --  IMPRESSION:  1. Fibrolinear change in the left upper lung which corresponds with a previously  demonstrated large mass from the January 2021 CT scan. The residual density in  this region may represent posttreatment change. Comparison with any more recent  chest x-rays would be useful.  2. No other evidence for acute cardiopulmonary process. [SL]   1522 Patient reports she is feeling better now. Able to tolerate PO water without any difficulty. Suspect possibly related to esophagitis/stricture history. She was scheduled to be DC from [facility] today at noon. She reports all DC plans have been arranged (home care, Rx, etc). She would like to be DC home. She has been using cath due to retention. She would like to have cath done here before going home.  [SL]      ED Course User Index  [SL] Hyacinth Weber PA C         Clinical Impressions as of 01/16/22 1629   Dysphagia, unspecified type - resolved              Hyacinth Weber PA C  01/16/22 2485

## 2022-01-16 NOTE — PROGRESS NOTES
Patient: Lisa Mast  Location: [facility] [address]  MRN: [id number]  Today's date: 1/16/2022    History of Present Illness  Lisa is a 66 y.o. female admitted on 1/10/2022 with Brain metastases (CMS/HCC) [C79.31]. Principal problem is Brain metastases (CMS/HCC).    66 yy.o. 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, radiation pneumonitis, esophageal stricture, and heart murmur presents for POV s/p SOC for resection of metastasis on 11/22/2021 presents with persistent headaches and worsening gait instability found to have hydrocephalus, pseudomeningocele, and interval increase of bleeding into resection cavity and is now s/p RF VPS, Delta 1.0 (1/6)     Neurosurgery note 1/10:   #Left cerebellar brain mass s/p SOC (11/22/2021) c/b hydrocephalus, pseudomeningocele, resection cavity hemorrhage now s/p RF VPS, Delta 1.0 (1/6)    #Post op Vision changes  -Post op (11/22): double / blurry vison, left eye ptosis, left eye nystagmus  -Seen by Ophthalmology last admission, scheduled for outpatient follow up in February    Past Medical History  Lisa has a past medical history of Hypertension, Lung cancer (CMS/HCC), and Lung mass.      PT Vitals    Date/Time Pulse HR Source BP BP Location BP Method Pt Position Observations [facility]   01/16/22 1105 59 Monitor 161/86 Left upper arm Automatic Sitting Pre-PT FTR    01/16/22 1154 -- -- -- -- -- -- Post-PT FTR; Pt asymptomatic seated in WC.       PT Pain    Date/Time Pain Type Side/Orientation Location Rating: Rest Rating: Activity Rating: Rest Rating: Activity Interventions [facility]   01/16/22 1105 Pain Assessment bilateral head -- -- 4 - moderate pain -- diversional activity provided    01/16/22 1123 Pain Reassessment -- -- 10 10 -- -- -- KG   01/16/22 1154 Pain Reassessment;Post Activity bilateral head -- -- 8 - severe pain 8 - severe pain position adjusted;other (see comments) Pt handed off to SLP at end of session, who is going to  complete FTR in room so NSG may provide pain meds. KD          Prior Living Environment      Most Recent Value   People in Home spouse   Current Living Arrangements home   Living Environment Comment [facility]. Pt resides on 1st floor   Number of Stairs, Main Entrance 1   Stair Railings, Main Entrance none   Stairs Comment, Main Entrance Reports  would assist PTA   Location, Patient Bedroom first (main) floor   Location, Bathroom first (main) floor   Bathroom Access Comment Walk in shower with small threshold. Has shower chair and grab bars. Grab bars by toilet          Prior Level of Function      Most Recent Value   Dominant Hand right   Ambulation assistive person   Transferring assistive person   Toileting assistive person   Bathing independent   Dressing independent  [ setup clothing]   Prior Level of Function Comment CGA/Taye from spouse. Pt lives with  in [place], enjoys taking walks and playing tennis,  Pt is health  and works full time,  Pt shares responsibility of managing household finances and scheduling calendars.  They own property in [place] which pt helps to manage   Assistive Device Currently Used at Home grab bar, shower chair, other (see comments)  [transport chair]           IRF PT Evaluation and Treatment - 01/16/22 1105        PT Time Calculation    Start Time 1100     Stop Time 1200     Time Calculation (min) 60 min        Session Details    Document Type daily treatment/progress note     Mode of Treatment physical therapy;individual therapy        General Information    Patient/Family/Caregiver Comments/Observations Pt's , Madhu and niece Jose present for FTR.     General Observations of Patient Pt received in ILU with OT and family for scheduled FTR.        Caregiver Training    Caregiver(s) to be Trained spouse/significant other;other (see comments)   niece    Caregiver Training Plan transfer training;other (see comments)   ambulation, stairs, curb  (single step)    Comment, Caregiver Training Plan Pt's  and niece present for scheduled FTR.  Both parties trained in and demonstrated appropriate physical assistance for transfers, ambulation, curb step, and stairs using L HHA + Terrence at hips for balance.  Car transfer demonstrated and reviewed by PT.  Home set up reviewed.  Discussed further home/fall safety with removal of throw rugs/clutter.   asking whether a RW would be beneficial for use, with PT educating all parties that use of a new AD should be assessed by a licensed PT after transition of care.  All parties verbalized understanding of pt's continued functional mobility impairments and deficits, with need for continued cues for safety to decrease impulsivity and hands on assist at all times for improved balance to decrease risk of falls.   and niece denied further questions, verbalized understanding of recommended level of assist, and stated pt will have 24/7 supervision/assist upon DC.  Pt and family refusing use of gait belt despite PT recommendations, but demonstrated safe assist of patient without addition of AD.  No further FTR training required with DC home planned for today, 1/16.        Sit to Stand Transfer    [place], Sit to Stand Transfer close supervision     Verbal Cues safety     Assistive Device none     Comment multiple trials from WC with Cl S for safety provided by PT and family        Stand to Sit Transfer    [place], Stand to Sit Transfer close supervision     Verbal Cues safety     Assistive Device none     Comment multiple trials to WC with Cl S for safety provided by PT and family        Stand Pivot Transfer    [place], Stand Pivot/Stand Step Transfer minimum assist (75% or more patient effort)     Verbal Cues safety     Assistive Device none     Comment multiple trials via amb approach with L HHA + Terrence at hips for balance and to reduce R lateral lean (provided by PT and family)        Car  "Transfer    Comment PT demonstrating and verbally reviewing safe car transfer technique, with emphasis on avoiding use of car door/door frame for hand placement as it is mobile and could cause a LOB/injury.        Curb Negotiation    [place] minimum assist (75% or more patient effort);verbal cues     Assistive Device none     Curb Height 6 inches     Comment Up/down 2\"+6\" x3, first with PT assisting then with both family members providing Terrence at trunk/hips for balance and to reduce R lateral lean. VC for safe pacing.        Rough/Uneven Surface Gait Skills    [place] minimum assist (75% or more patient effort)     Assistive Device gait belt     Distance in Feet 40 feet     Comment 40'x3 + multiple shorter distances over low pile carpet in ILU with L HHA + [place] at hips for balance/to reduce R lateral lean. Assist provided by PT and family.        Stairs Training    [place], Stairs minimum assist (75% or more patient effort);verbal cues     Assistive Device railing     Handrail Location (Stairs) right side (ascending);left side (descending)     Number of Stairs 4     Stair Height 6 inches     Ascending Stairs Technique step-over-step     Descending Stairs Technique step-over-step     Comment Up/down 4 stairs x3 with PT assisting first then both family members. Terrence at hips for balance and use of single HR per home set-up. VC for pacing and UE advancement on HR. Pt with self-selected stepping pattern.        Daily Progress Summary (PT)    Symptoms Noted During/After Treatment fatigue;increased pain     Daily Outcome Statement FTR completed with pt’s  and niece.  They demonstrated appropriate physical assistance for functional mobility using L HHA + Terrence at hips for balance with cues from PT.  All parties verbalized understanding of PT education per current assist levels, S/Terrence goals, and continued impairments.  Pt and family refusing use of gait belt despite PT recommendations, but demonstrated " safe assist of patient without addition of AD.  No further FTR training required with DC home planned for today, 1/16.                      Education Documentation  Transfer Techniques, Safe/Effective Methods, taught by Sandrine Whaley PT at 1/16/2022 12:43 PM.  Learner: Significant Other  Readiness: Acceptance  Method: Explanation  Response: Verbalizes Understanding  Comment: FTR completed with pt’s  and niece. They demonstrated appropriate assistance for functional mobility with cues from PT. All parties verbalized understanding of PT education. No further FTR training required with DC home planned for today, 1/16.    Transfer Techniques, Safe/Effective Methods, taught by Sandrine Whaley PT at 1/16/2022 12:43 PM.  Learner: Family  Readiness: Acceptance  Method: Explanation  Response: Verbalizes Understanding  Comment: FTR completed with pt’s  and niece. They demonstrated appropriate assistance for functional mobility with cues from PT. All parties verbalized understanding of PT education. No further FTR training required with DC home planned for today, 1/16.    Transfer Techniques, Safe/Effective Methods, taught by Sandrine Whaley PT at 1/16/2022 12:43 PM.  Learner: Patient  Readiness: Acceptance  Method: Explanation  Response: Verbalizes Understanding  Comment: FTR completed with pt’s  and niece. They demonstrated appropriate assistance for functional mobility with cues from PT. All parties verbalized understanding of PT education. No further FTR training required with DC home planned for today, 1/16.    Proper Body Mechanics, Strategies, taught by Sandrine Whaley PT at 1/16/2022 12:43 PM.  Learner: Significant Other  Readiness: Acceptance  Method: Explanation  Response: Verbalizes Understanding  Comment: FTR completed with pt’s  and niece. They demonstrated appropriate assistance for functional mobility with cues from PT. All parties verbalized understanding of PT education. No  further FTR training required with DC home planned for today, 1/16.    Proper Body Mechanics, Strategies, taught by Sandrine Whaley PT at 1/16/2022 12:43 PM.  Learner: Family  Readiness: Acceptance  Method: Explanation  Response: Verbalizes Understanding  Comment: FTR completed with pt’s  and niece. They demonstrated appropriate assistance for functional mobility with cues from PT. All parties verbalized understanding of PT education. No further FTR training required with DC home planned for today, 1/16.    Proper Body Mechanics, Strategies, taught by Sandrine Whaley PT at 1/16/2022 12:43 PM.  Learner: Patient  Readiness: Acceptance  Method: Explanation  Response: Verbalizes Understanding  Comment: FTR completed with pt’s  and niece. They demonstrated appropriate assistance for functional mobility with cues from PT. All parties verbalized understanding of PT education. No further FTR training required with DC home planned for today, 1/16.    Precautions/Limitations, Understanding/Management, taught by Sandrine Whaley PT at 1/16/2022 12:43 PM.  Learner: Significant Other  Readiness: Acceptance  Method: Explanation  Response: Verbalizes Understanding  Comment: FTR completed with pt’s  and niece. They demonstrated appropriate assistance for functional mobility with cues from PT. All parties verbalized understanding of PT education. No further FTR training required with DC home planned for today, 1/16.    Precautions/Limitations, Understanding/Management, taught by Sandrine Whaley PT at 1/16/2022 12:43 PM.  Learner: Family  Readiness: Acceptance  Method: Explanation  Response: Verbalizes Understanding  Comment: FTR completed with pt’s  and niece. They demonstrated appropriate assistance for functional mobility with cues from PT. All parties verbalized understanding of PT education. No further FTR training required with DC home planned for today, 1/16.    Precautions/Limitations,  Understanding/Management, taught by Sandrine Whaley PT at 1/16/2022 12:43 PM.  Learner: Patient  Readiness: Acceptance  Method: Explanation  Response: Verbalizes Understanding  Comment: FTR completed with pt’s  and niece. They demonstrated appropriate assistance for functional mobility with cues from PT. All parties verbalized understanding of PT education. No further FTR training required with DC home planned for today, 1/16.    Guarding Techniques, Mobility/Transfers, Safe/Effective Methods, taught by Sandrine Whaley PT at 1/16/2022 12:43 PM.  Learner: Significant Other  Readiness: Acceptance  Method: Explanation  Response: Verbalizes Understanding  Comment: FTR completed with pt’s  and niece. They demonstrated appropriate assistance for functional mobility with cues from PT. All parties verbalized understanding of PT education. No further FTR training required with DC home planned for today, 1/16.    Guarding Techniques, Mobility/Transfers, Safe/Effective Methods, taught by Sandrine Whaley PT at 1/16/2022 12:43 PM.  Learner: Family  Readiness: Acceptance  Method: Explanation  Response: Verbalizes Understanding  Comment: FTR completed with pt’s  and niece. They demonstrated appropriate assistance for functional mobility with cues from PT. All parties verbalized understanding of PT education. No further FTR training required with DC home planned for today, 1/16.    Guarding Techniques, Mobility/Transfers, Safe/Effective Methods, taught by Sandrine Whaley PT at 1/16/2022 12:43 PM.  Learner: Patient  Readiness: Acceptance  Method: Explanation  Response: Verbalizes Understanding  Comment: FTR completed with pt’s  and niece. They demonstrated appropriate assistance for functional mobility with cues from PT. All parties verbalized understanding of PT education. No further FTR training required with DC home planned for today, 1/16.    Fall Risk Prevention/Management, Strategies, taught by  Sandrine Whaley PT at 1/16/2022 12:43 PM.  Learner: Significant Other  Readiness: Acceptance  Method: Explanation  Response: Verbalizes Understanding  Comment: FTR completed with pt’s  and niece. They demonstrated appropriate assistance for functional mobility with cues from PT. All parties verbalized understanding of PT education. No further FTR training required with DC home planned for today, 1/16.    Fall Risk Prevention/Management, Strategies, taught by Sandrine Whaley PT at 1/16/2022 12:43 PM.  Learner: Family  Readiness: Acceptance  Method: Explanation  Response: Verbalizes Understanding  Comment: FTR completed with pt’s  and niece. They demonstrated appropriate assistance for functional mobility with cues from PT. All parties verbalized understanding of PT education. No further FTR training required with DC home planned for today, 1/16.    Fall Risk Prevention/Management, Strategies, taught by Sandrine Whaley PT at 1/16/2022 12:43 PM.  Learner: Patient  Readiness: Acceptance  Method: Explanation  Response: Verbalizes Understanding  Comment: FTR completed with pt’s  and niece. They demonstrated appropriate assistance for functional mobility with cues from PT. All parties verbalized understanding of PT education. No further FTR training required with DC home planned for today, 1/16.    Encourage Use of Personal Aids, Safe/Effective Methods, taught by Sandrine Whaley PT at 1/16/2022 12:43 PM.  Learner: Significant Other  Readiness: Acceptance  Method: Explanation  Response: Verbalizes Understanding  Comment: FTR completed with pt’s  and niece. They demonstrated appropriate assistance for functional mobility with cues from PT. All parties verbalized understanding of PT education. No further FTR training required with DC home planned for today, 1/16.    Encourage Use of Personal Aids, Safe/Effective Methods, taught by Sandrine Whaley PT at 1/16/2022 12:43 PM.  Learner:  Family  Readiness: Acceptance  Method: Explanation  Response: Verbalizes Understanding  Comment: FTR completed with pt’s  and niece. They demonstrated appropriate assistance for functional mobility with cues from PT. All parties verbalized understanding of PT education. No further FTR training required with DC home planned for today, 1/16.    Encourage Use of Personal Aids, Safe/Effective Methods, taught by Sandrine Whaley PT at 1/16/2022 12:43 PM.  Learner: Patient  Readiness: Acceptance  Method: Explanation  Response: Verbalizes Understanding  Comment: FTR completed with pt’s  and niece. They demonstrated appropriate assistance for functional mobility with cues from PT. All parties verbalized understanding of PT education. No further FTR training required with DC home planned for today, 1/16.          IRF PT Goals      Most Recent Value   Bed Mobility Goal 1    Activity/Assistive Device sit to supine/supine to sit, rolling to left, rolling to right at 01/11/2022 1037   [place] supervision required at 01/11/2022 1037   Time Frame short-term goal (STG), 1 week at 01/11/2022 1037   Progress/Outcome goal met at 01/15/2022 1304   Bed Mobility Goal 2    Activity/Assistive Device sit to supine/supine to sit, rolling to left, rolling to right at 01/11/2022 1037   [place] modified independence at 01/11/2022 1037   Time Frame long-term goal (LTG), 21 days or less at 01/11/2022 1037   Progress/Outcome goal not met, good progress toward goal at 01/15/2022 1304   Transfer Goal 1    Activity/Assistive Device sit-to-stand/stand-to-sit, stand pivot, no assistive device at 01/11/2022 1037   [place] minimum assist (75% or more patient effort)  [steadying assist] at 01/11/2022 1037   Time Frame short-term goal (STG), 1 week at 01/11/2022 1037   Progress/Outcome goal met at 01/15/2022 1304   Transfer Goal 2    Activity/Assistive Device sit-to-stand/stand-to-sit, bed-to-chair/chair-to-bed, car transfer,  stand pivot, no assistive device at 01/11/2022 1037   [place] supervision required at 01/11/2022 1037   Time Frame long-term goal (LTG), 21 days or less at 01/11/2022 1037   Progress/Outcome goal partially met, good progress toward goal at 01/15/2022 1304   Gait/Walking Locomotion Goal 1    Activity/Assistive Device gait (walking locomotion), no assistive device at 01/11/2022 1037   Distance 100 feet at 01/11/2022 1037   [place] minimum assist (75% or more patient effort) at 01/11/2022 1037   Time Frame short-term goal (STG), 1 week at 01/11/2022 1037   Progress/Outcome goal met at 01/15/2022 1304   Gait/Walking Locomotion Goal 2    Activity/Assistive Device gait (walking locomotion), no assistive device at 01/11/2022 1037   Distance 200 feet at 01/11/2022 1037   [place] supervision required at 01/11/2022 1037   Time Frame long-term goal (LTG), 21 days or less at 01/11/2022 1037   Progress/Outcome goal not met, good progress toward goal at 01/15/2022 1304   Stairs Goal 1    Activity/Assistive Device ascending stairs, descending stairs, using handrail, left, using handrail, right at 01/12/2022 1327   Number of Stairs 8 at 01/12/2022 1327   [place] minimum assist (75% or more patient effort) at 01/12/2022 1327   Time Frame short-term goal (STG), 1 week at 01/12/2022 1327   Progress/Outcome goal met at 01/15/2022 1304   Stairs Goal 2    Activity/Assistive Device ascending stairs, descending stairs, using handrail, left, using handrail, right at 01/12/2022 1327   Number of Stairs 12 at 01/12/2022 1327   [place] minimum assist (75% or more patient effort)  [steadying assist] at 01/12/2022 1327   Time Frame long-term goal (LTG), 21 days or less at 01/12/2022 1327   Progress/Outcome goal met at 01/15/2022 1304

## 2022-01-16 NOTE — DISCHARGE INSTRUCTIONS
Please review your discharge instructions from [facility]. Continue to straight cath to empty your bladder. Continue doxycycline as prescribed for a possible urinary tract infection.    Follow up with your gastroenterologist to discuss this episode of trouble swallowing. Call on Monday for an appointment.    Return to the ED for worsening of symptoms or any problems or concerns.  It is very important to follow up with your healthcare provider for re-evaluation.    You may have incidental findings on your lab work or radiology studies today. Please be sure to discuss your results with your primary care doctor. They may follow and perform additional testing/imaging if needed.

## 2022-01-16 NOTE — PLAN OF CARE
Problem: Rehabilitation (IRF) Plan of Care  Goal: Plan of Care Review  Flowsheets (Taken 1/16/2022 5980)  Progress: improving  Plan of Care Reviewed With:   patient   spouse  Outcome Summary: Pt seen for FT with  present with expected dc post training, however upon SLP arrival pt c/o 10/10 HA pain and was noted with significant change in swallow status. Overt s/sx with secretions and on PU4 for crushed medicaiton administration were observed. Pt with difficulty with AP transfer, reduced lingual strength with paired incoordinated and delayed transfers noted. Delayed, effortful and weak laryngeal elevation observed. Suspected reduced phayngeal clearance as pt was noted with wet vocal quality t/o with difficulty with paired reduced cough/ throat clear strength. Noted coughing t/o session in attempts to clear from pharynx however grossly unsuccessful. Pt was noted with increase in hyponasality of speech. Spoke with MD with recs for temporary NPO status and unsafe for dc at this time given signficant change in swallow status (was on Reg7/Thins0) to which MD agreed and rec'd for ED transfer. Pt herself endorses worsening swallow status from this AM alone. Pt and  were educated on recs and were in agreement/ receptive to going to ED. Would rec instrumentation for objective data of swallow physiology at this time.  given [facility] ST depts email/ phone number ect if needed. ST to f/u if pt returns

## 2022-01-16 NOTE — PROGRESS NOTES
"Called by speech therapy.  She is overtly failing swallow this afternoon with overt aspiration/secretions coming out of mouth and nose actively -- speech stepped out to notify me, recommending no diet at all right now and transfer emergently to ED for stabilization.  Charge RN notified. PH xfer being arranged.  was in room/aware.  Of note, Dr. Orellana/primary care note yesterday notes recent tachycardia/hypotension and empiric rx for UTI.   I spoke to PH.    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 Her post op course was not complicated. She has urinary retention, but has been refusing cic per RN at [facility], she stated she can urinate by herself, her BM is fine, no confusion or significant agitation, her VS is fine, no new neurologic deficits. Functionally, she has ADL and ambul;atory dysfunction, requiring inpt acute rehab, transferred to [facility] on 1/10/22.  \"  "

## 2022-01-16 NOTE — PROGRESS NOTES
Patient: Lisa Mast  Location: [facility] [address]  MRN: [id number]  Today's date: 1/16/2022    History of Present Illness  Lisa is a 66 y.o. female admitted on 1/10/2022 with Brain metastases (CMS/HCC) [C79.31]. Principal problem is Brain metastases (CMS/HCC).    66 yy.o. 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, radiation pneumonitis, esophageal stricture, and heart murmur presents for POV s/p SOC for resection of metastasis on 11/22/2021 presents with persistent headaches and worsening gait instability found to have hydrocephalus, pseudomeningocele, and interval increase of bleeding into resection cavity and is now s/p RF VPS, Delta 1.0 (1/6)     Neurosurgery note 1/10:   #Left cerebellar brain mass s/p SOC (11/22/2021) c/b hydrocephalus, pseudomeningocele, resection cavity hemorrhage now s/p RF VPS, Delta 1.0 (1/6)    #Post op Vision changes  -Post op (11/22): double / blurry vison, left eye ptosis, left eye nystagmus  -Seen by Ophthalmology last admission, scheduled for outpatient follow up in February    Past Medical History  Lisa has a past medical history of Hypertension, Lung cancer (CMS/HCC), and Lung mass.      SLP Vitals    Date/Time Pulse HR Source SpO2 Pt Activity O2 Therapy [facility]   01/16/22 1233 67 Monitor 98 % At rest None (Room air) KG      SLP Pain    Date/Time Pain Type Side/Orientation Location Rating: Rest Rating: Activity Interventions [facility]   01/16/22 1201 Pain Assessment -- head 10 -- medication administered    01/16/22 1207 Pain Assessment -- head 10 10 -- KG   01/16/22 1245 Pain Reassessment bilateral head 10 -- -- KL          Prior Living Environment      Most Recent Value   People in Home spouse   Current Living Arrangements home   Living Environment Comment [facility]ST[facility]. Pt resides on 1st floor   Number of Stairs, Main Entrance 1   Stair Railings, Main Entrance none   Stairs Comment, Main Entrance Reports  would assist PTA   Location, Patient  Bedroom first (main) floor   Location, Bathroom first (main) floor   Bathroom Access Comment Walk in shower with small threshold. Has shower chair and grab bars. Grab bars by toilet          Prior Level of Function      Most Recent Value   Dominant Hand right   Ambulation assistive person   Transferring assistive person   Toileting assistive person   Bathing independent   Dressing independent  [ setup clothing]   Prior Level of Function Comment CGA/Taye from spouse. Pt lives with  in [place], enjoys taking walks and playing tennis,  Pt is health  and works full time,  Pt shares responsibility of managing household finances and scheduling calendars.  They own property in [place] which pt helps to manage   Assistive Device Currently Used at Home grab bar, shower chair, other (see comments)  [transport chair]           IRF SLP Evaluation and Treatment - 01/16/22 1201        SLP Time Calculation    Start Time 1200     Stop Time 1245     Time Calculation (min) 45 min        Session Details    Document Type daily treatment/progress note     Mode of Treatment speech language pathology;individual therapy        General Information    Patient/Family/Caregiver Comments/Observations Pts  present for FT session     General Observations of Patient Pt rec;d in ILU w/ c/o 10/10 HA pain for FT- brought pt back to room for medication admin with  present and RN- see below notes        Caregiver Training    Caregiver(s) to be Trained spouse/significant other     Caregiver Training Plan dysphagia diet consistency;other (see comments);oral motor home exercise program     Comment, Caregiver Training Plan Pts  present- briefly reviewed OMEs and expected dc diet from handoff from primary however then noted change in swallow fx- see NIS section        Motor Speech    Comment, Motor Speech Intervention Increase in hyponasality noted this session;        General Swallowing Observations     Comment, General Swallowing Observations Pt seen in room slight throat clearing (? d/t secretions)- following c/o pain RN provided medication admin (liquid meds and crushed in pu4 (how pt has been tolerating) see below notes for signdicant change in swallow status        Food and Liquid Trials (NIS)    Patient Positioning upright in wheelchair     Liquid Consistencies Evaluated thin liquids     Thin Liquids impaired     Comment, Thin Liquids delayed swallow with effortful laryngeal elevation noted + multiple swallows to clear- Immediate overt s/sx with weak and ineffective cough and paired nasal regurtation noted     Food Consistencies Evaluated pureed (PU4)     Pureed (PU4) impaired     Comment, Pureed (PU4) crushed meds  in pudding- pt with signficant effort with AP transfer (>8 seconds) with lingual pumping and head up flexions for aid noted, multiple swallows to clear and immediate overt s/sx paired with WVQ t/o and suspected pharygneal residue= liquid wash and effortful swallows did not aid in pharyngeal clearance     Oral Phase of Swallow abnormal anterior tongue movement;delayed anterior-posterior transit;lingual pumping;oral residue, incomplete swallow;coughing prior to swallow     Comment, Oral Phase signficant delay incoordination of timing of transfer/ swallow initaiton     Pharyngeal Phase of Swallow delayed swallow reflex initiation;coughing during swallow;coughing after swallow;decreased laryngeal elevation;uncoordinated swallow;multiple swallows per bolus;reports sensation of food stuck in throat;reports sensation of liquid stuck in throat;wet vocal quality after the swallow     Comment, Pharyngeal Phase Signficant pharyngeal residue (suspected) in setting of multiple swallows, globus sensation and wet vocal quality Noted this was present with simply pts secretions as well with a signficant decline per recent notes. At times pt even had nasal regurtation of secretions x2. Liquid wash and effortful  swallows did not aid in clearance. Pt required oral suctioning eventually however residue in pharynx and pt with very weak cough to upbring at this time. Signficant overt s/sx with secretions, pU4 *during medications* and thins *attempted liquid washes     Comment Dicussed in depth with pt and pts , in presence of x2 nurse supervisors, the current concerns for swallow status change. Pt herself was able to ID increase difficulty from this AM and per RN pt with no overt s/sx during breakfast tray- thus sudden change in status. Dicussed in depth ris of aspiration/ aspiration PNA and its sequela to which both parties were receptive. Spoke with house MD and dicussed signficant dysphafia decline (reg7/ thins0) to overt s/sx with secretions and high concerns for aspiration/ phayrngeal retention. MD in agreement to not send pt home, make temporary NPO and send to ED for work up. Spoke in depth with family re this rec for saftey at this time to which both  and pt were receptive to given change in status        Swallowing Recommendations    Comment, Swallowing Recommendations Rec for temporary NPO at this time until instrumentation and further work up for etiology of change in swallow status at ED        Daily Progress Summary (SLP)    Daily Outcome Statement Pt seen for FT with  present with expected dc post training, however upon SLP arrival pt c/o 10/10 HA pain and was noted with significant change in swallow status. Overt s/sx with secretions and on PU4 for crushed medicaiton administration were observed. Pt with difficulty with AP transfer, reduced lingual strength with paired incoordinated and delayed transfers noted. Delayed, effortful and weak laryngeal elevation observed. Suspected reduced phayngeal clearance as pt was noted with wet vocal quality t/o with difficulty with paired reduced cough/ throat clear strength. Noted coughing t/o session in attempts to clear from pharynx however grossly  unsuccessful. Pt was noted with increase in hyponasality of speech. Spoke with MD with recs for temporary NPO status and unsafe for dc at this time given signficant change in swallow status (was on Reg7/Thins0) to which MD agreed and rec'd for ED transfer. Pt herself endorses worsening swallow status from this AM alone. Pt and  were educated on recs and were in agreement/ receptive to going to ED. Would rec instrumentation for objective data of swallow physiology at this time.  given [facility] ST depts email/ phone number ect if needed. ST to f/u if pt returns                 Education Documentation  Swallowing Compensatory Strategies,, Safe/Effective Use, taught by Molly Bashir CCC-SLP at 1/16/2022  1:25 PM.  Learner: Family  Readiness: Acceptance  Method: Demonstration, Explanation, Handout  Response: Demonstrated Understanding  Comment: Change in swallow status- rec for work up (Sent to ED); aspiration risks    Swallowing Compensatory Strategies,, Safe/Effective Use, taught by Molly Bashir CCC-SLP at 1/16/2022  1:25 PM.  Learner: Patient  Readiness: Acceptance  Method: Demonstration, Explanation, Handout  Response: Demonstrated Understanding  Comment: Change in swallow status- rec for work up (Sent to ED); aspiration risks    Eating and Swallowing Adaptations, taught by Molly Bashir CCC-SLP at 1/16/2022  1:25 PM.  Learner: Family  Readiness: Acceptance  Method: Demonstration, Explanation, Handout  Response: Demonstrated Understanding  Comment: Change in swallow status- rec for work up (Sent to ED); aspiration risks    Eating and Swallowing Adaptations, taught by Molly Bashir CCC-SLP at 1/16/2022  1:25 PM.  Learner: Patient  Readiness: Acceptance  Method: Demonstration, Explanation, Handout  Response: Demonstrated Understanding  Comment: Change in swallow status- rec for work up (Sent to ED); aspiration risks          IRF SLP Goals      Most Recent Value   Motor Speech/Voice Goal 1    Motor  Speech/Voice Goal 1 use strategies for articulatory precision in simple to mod level speech tasks x 80% mod cues at 01/11/2022 0915   Time Frame short-term goal (STG), 1 week at 01/11/2022 0915   Motor Speech/Voice Goal 2    Motor Speech/Voice Goal 2 use strategies for articulatory precision in mod to high level speech tasks x 90% min cues at 01/11/2022 0915   Time Frame long-term goal (LTG), 3 weeks at 01/11/2022 0915   Oral Nutrition/Hydration Goal 1    Activity effective/safe/independent, oral nutrition/hydration, use of swallowing techniques  [tolerate regular and thins with use of small bites/sips strategy x 90% min cues] at 01/11/2022 0915   Time Frame short-term goal (STG), 1 week at 01/11/2022 0915   Oral Nutrition/Hydration Goal 2    Activity effective/safe/independent  [use swallow strategies x 100%Carmela] at 01/11/2022 0915   Time Frame long-term goal (LTG), 3 weeks at 01/11/2022 0915   Attention Goal 1    Activity maintain focused/sustained attention, perform selective attention tasks, perform alternating attention tasks, for 10 minutes  [mod level tasks] at 01/11/2022 0915   Ellis/Cues with moderate, verbal cues/redirection at 01/11/2022 0915   Time Frame short-term goal (STG), 1 week at 01/11/2022 0915   Attention Goal 2    Activity maintain focused/sustained attention, perform selective attention tasks, perform alternating attention tasks, for 20-30 minutes at 01/11/2022 0915   Ellis/Cues independently at 01/11/2022 0915   Time Frame long-term goal (LTG), 3 weeks at 01/11/2022 0915   Executive Function Goal 1    Activity abstract thinking tasks, insight/awareness of deficits, organization/sequencing tasks, planning/decision-making tasks, problem-solving/reasoning tasks  [mod level] at 01/11/2022 0915   Ellis/Accuracy with 80% accuracy, with moderate, verbal cues/redirection at 01/11/2022 0915   Time Frame short-term goal (STG), 1 week at 01/11/2022 0915   Executive Function Goal 2     Activity abstract thinking tasks, insight/awareness of deficits, organization/sequencing tasks, planning/decision-making tasks, problem-solving/reasoning tasks at 01/11/2022 0915   Lawrence/Accuracy with 90% accuracy, with minimum, verbal cues/redirection at 01/11/2022 0915   Time Frame long-term goal (LTG), 3 weeks at 01/11/2022 0915   Memory Goal 1    Activity short-term memory tasks, working memory tasks, prospective memory tasks, recall recent events  [mod level] at 01/11/2022 0915   Lawrence/Accuracy with 80% accuracy, moderate cues for use of strategies at 01/11/2022 0915   Memory Goal 2    Activity short-term memory tasks, working memory tasks, prospective memory tasks, recall recent events at 01/11/2022 0915   Lawrence/Accuracy with 90% accuracy, minimal cues for use of strategies at 01/11/2022 0915   Time Frame long-term goal (LTG), 3 weeks at 01/11/2022 0915

## 2022-01-16 NOTE — PROGRESS NOTES
[facility] Internal Medicine Progress Note          Patient was seen and examined at bedside.    Subjective:     1/14/22  Clinically, her tachycardia and hypotension has resolved, afebrile, she feels much better this am. Of note RN was unable to draw her blood and to collect a urine sample for ordered sepsis workup yesterday before empiric abx had to be given yesterday, so we do not have microbiology data to reference to, but her yesterday's clinical picture c/w sepsis, mostly likely due to urinary retention with UTI, and clinically so far she responded to empiric treatment plus IVF very well . Plan : keep Rocephin for 1 more dose and keep empiric doxycyline for totally 7 days .      Clinically she is stable, she will finish her inpt acute rehab in 2 days, her family plans to bring her home with home visiting RN care on 1/16/22, the team has agreed,  spent 35 min to prepare for her d/c home on 1/16/22 .    1/16/22  Stable, ready to go home today, no O/N events or nursing issues.    Objective   Vital signs in last 24 hours:  Temp:  [36.3 °C (97.3 °F)-36.4 °C (97.5 °F)] 36.4 °C (97.5 °F)  Heart Rate:  [63-81] 75  Resp:  [17-18] 18  BP: (147-166)/(72-88) 160/88      Intake/Output Summary (Last 24 hours) at 1/16/2022 1042  Last data filed at 1/16/2022 0830  Gross per 24 hour   Intake 100 ml   Output 2200 ml   Net -2100 ml     Intake/Output this shift:  I/O this shift:  In: -   Out: 750 [Urine:750]   Review of Systems:  All other systems reviewed and negative except as noted in the HPI.   Objective      Labs  reviewed her labs thoroughly   Lab Results   Component Value Date    WBC 6.05 01/14/2022    HGB 12.7 01/14/2022    HCT 39.3 01/14/2022    MCV 89.5 01/14/2022     01/14/2022     Lab Results   Component Value Date    GLUCOSE 117 (H) 01/11/2022    CALCIUM 9.5 01/11/2022     (L) 01/11/2022    K 4.2 01/11/2022    CO2 29 01/11/2022    CL 94 (L) 01/11/2022    BUN 15 01/11/2022    CREATININE 0.4 (L)  01/11/2022       Imaging  [facility] imaging study reports reviewed       Full Code    Physical Exam:  Head/Ear/Nose/Throat: scalp incisional site clean, no discharge or bleeding, focal c/d/i; moisture mouth mm, no oropharyngeal thrush noted.   Eyes: anicteric sclera, EOMI; PERRL.   Neck : supple, no JVD, no carotid bruits appeciated.   Respiratory: no evidence of labored breathing, lung sounds CTA b/l, good aeration bibasilar area, no w/r/c.   Cardiovascular: RRR; normal S1, S2; no m/r/g; no S3 or S4.   Gastrointestinal: soft; NT; BS normal; mildly distended; no CVAT b/l.   Genitourinary: no hi.   Extremities : no c/c/e .   Neurological: AO x 3, fluent speeches, following commands, CNS II-XII grossly intact; no focal neurologic deficits.   Behavior/Emotional: in NAD, appropriate; cooperative.   Skin: clean, dry and intact.     Plan of care was discussed with patient, RN, and PMR attending     Assessment   CC:Metastic brain tumor form lung adenocarcinoma, s/p resection, complicated with hydrocephalus and pseudomeningocele, associated with persistent headaches and worsening gait instability, s/p an elective RF VPS, ADL and ambulatory dysfunction.      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 Her post op course was not complicated. She has urinary retention, but has been refusing cic per RN at [facility], she stated she can urinate by herself, her BM is fine, no confusion or significant agitation, her VS is fine, no new neurologic deficits. Functionally, she has ADL and ambul;atory dysfunction,  requiring inpt acute rehab, transferred to [facility] on 1/10/22.    #Left cerebellar brain mass s/p SOC (11/22/2021) c/b hydrocephalus, pseudomeningocele, resection cavity hemorrhage now s/p RF VPS, Delta 1.0 (1/6)  -regular Neuro Checks  -complete Dexamethasone taper course   -inpt acute rehab   -f/u with neurosurgeon, Rad-Onc, and Med-Onc as planned     #Pain Mangement  -regular pain scale evaluation  -Tylenol PRN, Oxycodone PRN, Flexeril PRN  -opioids precaution.     #Lung cancer (CMS-HCC)  -Holding home Keytruda, can restart in 2 weeks  -F/u with Medical Oncologist: Dr. Wang Batista ([facility])  -F/u with Radiation Oncologist: Dr. Mckinnye, recently seen by Dr. Mills ([facility])     #Hyponatremia  -not surprising for intracranial tumor and surgery, c/w SIADH with urine osm 317 / Urine Na 86 / Serum Osm 274 / Serum Na 133 (1/5)  -Fluid restriction, salt tablets, Trend NA     #Post op Vision changes  -Post op (11/22): double / blurry vison, left eye ptosis, left eye nystagmus  -Seen by Ophthalmology last admission, scheduled for outpatient follow up in February     #Hypertension  -Continue home antihypertensives with adjustment   -monitor her BP, with orthostatic hypotension precaution.      #Esophagitis  -Continue home Lansoprazole , elevate HOB, diet modification, f/u with ENT     #Prophylaxis  -Per protocol     #Metastic brain tumor form lung adenocarcinoma, s/p resection, complicated with hydrocephalus and pseudomeningocele, associated with persistent headaches and worsening gait instability, s/p an elective RF VPS, ADL and ambulatory dysfunction  : inpt comprehensive acute rehab, ADL, gait and balancing training, regular neuro checks,  fall precaution, pain and medical management, DVT prophylaxis, dermal defense, f/u with neurosurgeon, medical and radiation oncologist as scheduled.           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

## 2022-01-16 NOTE — NURSING NOTE
Lisa failing swallow this afternoon with speech therapy. Currently keeping NPO. Oxygen remains stable. A clear difference is occurring compared to this morning and yesterday. To be sent to ED for evaluation.  in room and aware.

## 2022-01-16 NOTE — NURSING NOTE
Around 0530 patient refused bladder scanning and cathing, was able to have a continent void for 200mL. Patient education provided and patient stated they will allow dayshift to cath prior to discharge. Presently in bed resting, no S/S distress or discomfort noted, call-bell within reach.

## 2022-01-16 NOTE — ED ATTESTATION NOTE
I have personally seen and examined the patient.  I reviewed and agree with physician assistant / nurse practitioner’s assessment and plan of care, with the following exceptions: None  My examination, assessment, and plan of care of Lisa Mast is as follows:        66-year-old female with history of metastatic lung adenocarcinoma with brain mets,  shunt placement esophageal strictures and esophagitis is at [facility] and was to be discharged today.  Patient had difficulty swallowing a Tylenol tablet and felt as if it was stuck in her upper esophagus.  Patient reports pain with swallowing however her symptoms improved in the ED.  On exam patient is awake alert no acute distress.  She has no respiratory distress.  She has a normal voice.  She is handling her secretions.  Patient was able to tolerate swallowing water without difficulty.  CT of her head shows no acute abnormality.  Lab work was unremarkable.  Patient feels better and would like to be discharged home.  Patient reports she can follow-up with her gastroenterologist at [place].     Fatoumata Medrano MD  01/16/22 5111

## 2022-01-16 NOTE — PROGRESS NOTES
Patient: Lisa Mast  Location: [facility] [address]  MRN: [id number]  Today's date: 1/16/2022    History of Present Illness  Lisa is a 66 y.o. female admitted on 1/10/2022 with Brain metastases (CMS/HCC) [C79.31]. Principal problem is Brain metastases (CMS/HCC).    66 yy.o. 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, radiation pneumonitis, esophageal stricture, and heart murmur presents for POV s/p SOC for resection of metastasis on 11/22/2021 presents with persistent headaches and worsening gait instability found to have hydrocephalus, pseudomeningocele, and interval increase of bleeding into resection cavity and is now s/p RF VPS, Delta 1.0 (1/6)     Neurosurgery note 1/10:   #Left cerebellar brain mass s/p SOC (11/22/2021) c/b hydrocephalus, pseudomeningocele, resection cavity hemorrhage now s/p RF VPS, Delta 1.0 (1/6)    #Post op Vision changes  -Post op (11/22): double / blurry vison, left eye ptosis, left eye nystagmus  -Seen by Ophthalmology last admission, scheduled for outpatient follow up in February    Past Medical History  Lisa has a past medical history of Hypertension, Lung cancer (CMS/HCC), and Lung mass.      OT Vitals    Date/Time Pulse BP BP Location BP Method Pt Position [facility]   01/16/22 1038 59 144/83 Left upper arm Automatic Sitting ANB      OT Pain    Date/Time Pain Type Side/Orientation Location Rating: Rest Interventions [facility]   01/16/22 1038 Pain Assessment bilateral head 4 - moderate pain position adjusted ANB   01/16/22 1051 Post Activity bilateral head 4 - moderate pain position adjusted ANB          Prior Living Environment      Most Recent Value   People in Home spouse   Current Living Arrangements home   Living Environment Comment [facility]. Pt resides on 1st floor   Number of Stairs, Main Entrance 1   Stair Railings, Main Entrance none   Stairs Comment, Main Entrance Reports  would assist PTA   Location, Patient Bedroom first (main) floor    Location, Bathroom first (main) floor   Bathroom Access Comment Walk in shower with small threshold. Has shower chair and grab bars. Grab bars by toilet          Prior Level of Function      Most Recent Value   Dominant Hand right   Ambulation assistive person   Transferring assistive person   Toileting assistive person   Bathing independent   Dressing independent  [ setup clothing]   Prior Level of Function Comment CGA/Taye from spouse. Pt lives with  in [place], enjoys taking walks and playing tennis,  Pt is health  and works full time,  Pt shares responsibility of managing household finances and scheduling calendars.  They own property in [place] which pt helps to manage   Assistive Device Currently Used at Home grab bar, shower chair, other (see comments)  [transport chair]          Occupational Profile      Most Recent Value   Successful Occupations Works as . +   Occupational History/Life Experiences Enjoys playing tennis, rides horses   Environmental Supports and Barriers Supportive    Patient Goals Improve my balance, vision           IRF OT Evaluation and Treatment - 01/16/22 1030        OT Time Calculation    Start Time 1030     Stop Time 1100     Time Calculation (min) 30 min        Session Details    Document Type daily treatment/progress note     Mode of Treatment occupational therapy;individual therapy        General Information    Patient/Family/Caregiver Comments/Observations Pt's  Madhu and Katherineece Jose     General Observations of Patient pt's family present for day of d/c family training        Caregiver Training    Caregiver(s) to be Trained spouse/significant other;other (see comments)   khari Garcia    Caregiver Training Plan safe ADL techniques;transfer training;home exercise program     Comment, Caregiver Training Plan Pt's /niece present for family training session, report understanding of v/p HEP via oculomotor/VOR  exercises, report understanding. Pt's family reports has all DME recommended, no further DME recommended at this time. Pt's family educated on pt's current functional ADL level at [facility], pt's  reports understanding. Pt's  demonstrates safe pt. handling to complete toilet/shower transfer in home setting c use of DME via L HHA and arm around as needed. Pt's niece demonstrates safe pt. handling to complete HHM in home setting via L arm around and L HHA. Pt. reports does not like to utilize gait belt. No further questions at this time, pt. preparing for d/c home post training sessions.        Sit to Stand Transfer    [place], Sit to Stand Transfer close supervision     Verbal Cues safety     Comment from w/c and DME, pt's niece/huband assisting        Stand to Sit Transfer    [place], Stand to Sit Transfer close supervision     Verbal Cues safety     Comment to w/c and DME surfaces, pts niece/ assisting        Toilet Transfer    Transfer Technique --   amb level no AD    [place], Toilet Transfer minimum assist (75% or more patient effort)     Verbal Cues safety     Assistive Device grab bars/safety frame     Comment Pt's  assisting, L HHA and arm around in simulated apartment setting, demonstrates safe pt. handling        Shower Transfer    Transfer Technique --   amb level no AD    [place], Shower Transfer minimum assist (75% or more patient effort)     Verbal Cues safety     Assistive Device grab bars/tub rail;shower chair     Comment amb level c L HHA and arm around, pt's  assisting, demonstrates safe pt. handling in simulated apartment setting        Safety Issues, Functional Mobility    Comment, Safety Issues/Impairments (Mobility) OT: [place] via L HHA and arm around, pt's /niece assisting, intermittent R lateral sway noted, pt's  and niece assisting        Bathing    Comment pt's family educated on current functional level        Upper  Body Dressing    Comment pt's family educated on current functional level        Lower Body Dressing    Comment pt's family educated on current functional level        Grooming    Comment pt's family educated on current functional level        Toileting    Comment pt's family educated on current functional level        Daily Progress Summary (OT)    Daily Outcome Statement Pt's family present for day of d/c family training, educated on current functional ADL/transfer status, see caregiver training and education sections for further details, pt's family c no further questions, pt. preparing for d/c to home c family on 1/16/21.                      Education Documentation  Transfer Techniques, Safe/Effective Methods, taught by Greta Crawford OT at 1/16/2022 10:55 AM.  Learner: Significant Other  Readiness: Acceptance  Method: Explanation, Demonstration  Response: Verbalizes Understanding, Demonstrated Understanding  Comment: pt's /niece demonstrate safe pt. handling to complete functional transfers in bathroom setting and HHM in home setting via HHA on L, no use of gait belt    Transfer Techniques, Safe/Effective Methods, taught by Greta Crawford OT at 1/16/2022 10:55 AM.  Learner: Family  Readiness: Acceptance  Method: Explanation, Demonstration  Response: Verbalizes Understanding, Demonstrated Understanding  Comment: pt's /niece demonstrate safe pt. handling to complete functional transfers in bathroom setting and HHM in home setting via HHA on L, no use of gait belt    Transfer Techniques, Safe/Effective Methods, taught by Greta Crawford OT at 1/16/2022 10:55 AM.  Learner: Patient  Readiness: Acceptance  Method: Explanation, Demonstration  Response: Verbalizes Understanding, Demonstrated Understanding  Comment: pt's /niece demonstrate safe pt. handling to complete functional transfers in bathroom setting and HHM in home setting via HHA on L, no use of gait belt    Encourage Use of  Personal Aids, Safe/Effective Methods, taught by Greta Crawford OT at 1/16/2022 10:54 AM.  Learner: Family  Readiness: Acceptance  Method: Explanation, Demonstration  Response: Demonstrated Understanding, Verbalizes Understanding  Comment: pt's family aware of DME needs, has all needs recommended including R lateral grab bar around toilet surfaces, grab bar in stall shower and shower chair c back in stall shower    Encourage Use of Personal Aids, Safe/Effective Methods, taught by Greta Crawford, OT at 1/16/2022 10:54 AM.  Learner: Significant Other  Readiness: Acceptance  Method: Explanation, Demonstration  Response: Demonstrated Understanding, Verbalizes Understanding  Comment: pt's family aware of DME needs, has all needs recommended including R lateral grab bar around toilet surfaces, grab bar in stall shower and shower chair c back in stall shower    Encourage Use of Personal Aids, Safe/Effective Methods, taught by Greta Crawford OT at 1/16/2022 10:54 AM.  Learner: Patient  Readiness: Acceptance  Method: Explanation, Demonstration  Response: Demonstrated Understanding, Verbalizes Understanding  Comment: pt's family aware of DME needs, has all needs recommended including R lateral grab bar around toilet surfaces, grab bar in stall shower and shower chair c back in stall shower          IRF OT Goals      Most Recent Value   Transfer Goal 1    Activity/Assistive Device toilet at 01/11/2022 0721   [place] supervision required  [CS] at 01/11/2022 0721   Time Frame short-term goal (STG), 5 - 7 days at 01/11/2022 0721   Progress/Outcome goal not met at 01/15/2022 0832   Transfer Goal 2    Activity/Assistive Device toilet at 01/11/2022 0721   [place] modified independence at 01/11/2022 0721   Time Frame long-term goal (LTG), 21 days or less at 01/11/2022 0721   Progress/Outcome goal not met at 01/15/2022 0832   Transfer Goal 3    Activity/Assistive Device shower at 01/11/2022 0721   [place]  supervision required  [CS] at 01/11/2022 0721   Time Frame short-term goal (STG), 5 - 7 days at 01/11/2022 0721   Progress/Outcome goal not met at 01/15/2022 0832   Transfer Goal 4    Activity/Assistive Device shower at 01/11/2022 0721   [place] supervision required at 01/11/2022 0721   Time Frame long-term goal (LTG), 21 days or less at 01/11/2022 0721   Progress/Outcome goal not met at 01/15/2022 0832   Bathing Goal 1    Activity/Assistive Device bathing skills, all at 01/11/2022 0721   [place] supervision required  [CS] at 01/11/2022 0721   Time Frame short-term goal (STG), 5 - 7 days at 01/11/2022 0721   Progress/Outcome goal met at 01/15/2022 0832   Bathing Goal 2    Activity/Assistive Device bathing skills, all at 01/11/2022 0721   [place] supervision required at 01/11/2022 0721   Time Frame long-term goal (LTG), 21 days or less at 01/11/2022 0721   Progress/Outcome goal not met at 01/15/2022 0832   UB Dressing Goal 1    Activity/Assistive Device upper body dressing at 01/11/2022 0721   [place] supervision required  [CS with item retrieval] at 01/11/2022 0721   Time Frame short-term goal (STG), 5 - 7 days at 01/11/2022 0721   Strategies/Barriers not including item retrieval at 01/15/2022 0832   Progress/Outcome goal partially met at 01/15/2022 0832   UB Dressing Goal 2    Activity/Assistive Device upper body dressing at 01/11/2022 0721   [place] modified independence at 01/11/2022 0721   Time Frame long-term goal (LTG), 21 days or less at 01/11/2022 0721   Progress/Outcome goal not met at 01/15/2022 0832   LB Dressing Goal 1    Activity/Assistive Device lower body dressing at 01/11/2022 0721   [place] supervision required  [CS] at 01/11/2022 0721   Time Frame short-term goal (STG), 5 - 7 days at 01/11/2022 0721   Progress/Outcome goal met at 01/15/2022 0832   LB Dressing Goal 2    Activity/Assistive Device lower body dressing at 01/11/2022 0721   [place] modified independence  at 01/11/2022 0721   Time Frame long-term goal (LTG), 21 days or less at 01/11/2022 0721   Progress/Outcome goal not met at 01/15/2022 0832   Grooming Goal 1    Activity/Assistive Device grooming skills, all at 01/11/2022 0721   [place] supervision required  [CS in stance] at 01/11/2022 0721   Time Frame short-term goal (STG), 5 - 7 days at 01/11/2022 0721   Progress/Outcome goal met at 01/15/2022 0832   Grooming Goal 2    Activity/Assistive Device grooming skills, all at 01/11/2022 0721   [place] modified independence at 01/11/2022 0721   Time Frame long-term goal (LTG), 21 days or less at 01/11/2022 0721   Progress/Outcome goal not met at 01/15/2022 0832   Toileting Goal 1    Activity/Assistive Device toileting skills, all at 01/11/2022 0721   [place] supervision required  [CS] at 01/11/2022 0721   Time Frame short-term goal (STG), 5 - 7 days at 01/11/2022 0721   Progress/Outcome goal met at 01/15/2022 0832   Toileting Goal 2    Activity/Assistive Device toileting skills, all at 01/11/2022 0721   [place] modified independence at 01/11/2022 0721   Time Frame long-term goal (LTG), 21 days or less at 01/11/2022 0721   Progress/Outcome goal not met at 01/15/2022 0832

## 2022-01-18 LAB — BACTERIA UR CULT: NORMAL

## 2022-01-19 NOTE — DISCHARGE SUMMARY
Rehab Discharge Summary    BRIEF OVERVIEW  Admitting Provider: Cezar Alamo,   Discharge Provider: No att. providers found  Primary Care Physician at Discharge: Goldberg, Daphne M, [phone]     Admission Date: 1/10/2022     Discharge Date: 1/16/2022    Primary Discharge Diagnosis  Brain metastases (CMS/HCC)    Secondary Discharge Diagnosis    Hydrocephalus status post  shunt, urinary retention, gait dysfunction, cognitive impairment, urinary tract infection, metastatic lung adenocarcinoma    Discharge Disposition  Acute Care Facility - Other  Code Status at Discharge: Prior    Discharge Medications     Medication List      START taking these medications    acetaminophen 650 mg/20.3 mL solution  Commonly known as: TYLENOL  Take 20.3 mL (650 mg total) by mouth every 6 (six) hours as needed (pain).  Dose: 650 mg     calcium carbonate 200 mg calcium (500 mg) chewable tablet  Commonly known as: TUMS  Take 1 tablet (500 mg total) by mouth 3 (three) times a day as needed for indigestion or heartburn.  Dose: 1 tablet     * dexAMETHasone 4 mg tablet  Commonly known as: DECADRON  Take 1 tablet (4 mg total) by mouth every 8 (eight) hours for 12 doses.  Dose: 4 mg     * dexAMETHasone 4 mg tablet  Commonly known as: DECADRON  Start taking on: January 21, 2022  Take 1 tablet (4 mg total) by mouth every 12 (twelve) hours for 8 doses.  Dose: 4 mg     * dexAMETHasone 2 mg tablet  Commonly known as: DECADRON  Start taking on: January 25, 2022  Take 1 tablet (2 mg total) by mouth every 12 (twelve) hours for 8 doses.  Dose: 2 mg     * dexAMETHasone 2 mg tablet  Commonly known as: DECADRON  Start taking on: January 29, 2022  Take 1 tablet (2 mg total) by mouth daily for 4 doses.  Dose: 2 mg     docusate sodium 100 mg capsule  Commonly known as: COLACE  Take 1 capsule (100 mg total) by mouth 2 (two) times a day.  Dose: 100 mg     doxycycline hyclate 100 mg tablet  Commonly known as: VIBRA-TABS  Take 1 tablet (100 mg total)  by mouth every 12 (twelve) hours for 6 doses Indications: UTI with early sepsis.  Dose: 100 mg     enoxaparin 40 mg/0.4 mL syringe  Commonly known as: LOVENOX  Inject 0.4 mL (40 mg total) under the skin daily.  Dose: 40 mg     ondansetron ODT 4 mg disintegrating tablet  Commonly known as: ZOFRAN-ODT  Take 1 tablet (4 mg total) by mouth every 8 (eight) hours as needed for nausea or vomiting for up to 7 days.  Dose: 4 mg     oxyCODONE 5 mg immediate release tablet  Commonly known as: ROXICODONE  Take 1.5 tablets (7.5 mg total) by mouth every 6 (six) hours as needed for severe pain for up to 5 days.  Dose: 7.5 mg     pantoprazole 40 mg EC tablet  Commonly known as: PROTONIX  Take 1 tablet (40 mg total) by mouth daily Indications: gastroesophageal reflux disease.  Dose: 40 mg     senna 8.6 mg tablet  Commonly known as: SENOKOT  Take 2 tablets by mouth daily.  Dose: 2 tablet         * This list has 4 medication(s) that are the same as other medications prescribed for you. Read the directions carefully, and ask your doctor or other care provider to review them with you.            CHANGE how you take these medications    losartan 50 mg tablet  Commonly known as: COZAAR  Take 1 tablet (50 mg total) by mouth daily with dinner.  Dose: 50 mg  What changed:   · medication strength  · how much to take  · when to take this        STOP taking these medications    dilTIAZem  mg 24 hr capsule  Commonly known as: CARDIZEM CD            Active Issues Requiring Follow-up  Issue: urinary retention, metastatic lung adenocarcinoma with brain mets complicated by hydrocephalus  Responsible Individual: PCP, oncologist, neurosurgery  What is Needed: arrange follow up with neurosurgery and oncology and present to ED if unable to urinate.  Scalp incision was healing well at the time of discharge staples were in place.  She will need to follow-up with neurosurgery for wound inspection and staple removal.  Follow-up Appointments Arranged:  No     Outpatient Follow-Up  Encounter Information    This patient does not currently have any appointments scheduled.         Referrals and Follow-ups to Schedule    [facility]   [phone]   RN PT OT ST    They will call to schedule visits.           Test Results Pending at Discharge  Pending Labs     Order Current Status    Blood Culture Blood, Venous Preliminary result    Blood Culture Blood, Venous Preliminary result          DETAILS OF HOSPITAL STAY    Presenting Problem/History of Present Illness  Brain metastases (CMS/HCC) [C79.31]      Hospital Course    Mrs. Mast is a 66-year-old female who was admitted to [facility] on 1/10/2022 to address deficits related to metastatic lung adenocarcinoma complicated by hydrocephalus status post right  shunt placement on 1/6/2022.  During her acute rehab stay she received a total of 6 days of skilled physical therapy, Occupational Therapy and speech therapy as well as evaluation by psychology, and 24-hour rehab nursing care.  Her acute rehab stay was complicated by disinterest in participating in acute rehab and wanting to go home.  During her stay she was also noted to have urinary retention requiring clean intermittent catheterization; she was resistant to have catheterization performed or have hi catheter inserted despite PVRs of over 800 cc.  She was treated empirically for UTI with doxycycline.  She was initially started on Hytrin which was discontinued due to hypotension.  Decadron taper was adjusted due to persistent headaches.  Scalp incision was healing well at the time of discharge staples were in place.  She will need to follow-up with neurosurgery for wound inspection and staple removal.  A family meeting was held.   was aware at that she would need to go to the ER she is unable to void at home as she would be at risk for renal failure and urosepsis.  She was determined to medically stable for discharge to home with  services on 1/16/2022.    Operative Procedures Performed    Consults: general medicine  Procedures: none

## 2022-01-20 LAB
BACTERIA BLD CULT: NORMAL
BACTERIA BLD CULT: NORMAL

## 2023-02-11 NOTE — ASSESSMENT & PLAN NOTE
Stopped hytrin secondary to hypotension.  Continue to monitor PVR, cath prn  Checking u/a  treating empirically for UTI with CTX and doxycycline.  F/u culture results   unable to determine

## (undated) DEVICE — SYRINGE DISP LUER-LOK 20 CC

## (undated) DEVICE — SYRINGE FLUSH NACL 10ML

## (undated) DEVICE — GOWN SURGICAL REINFORCED LARGE

## (undated) DEVICE — VALVES SUCTION FOR BRONCHO

## (undated) DEVICE — TUBE SUCTION 1/4INX20FT STERILE

## (undated) DEVICE — SYRINGE 10CC NO NEEDLE ST

## (undated) DEVICE — VALVE BIOPSY

## (undated) DEVICE — SYRINGE DISP LUER-LOK 10 CC

## (undated) DEVICE — MOUTHPIECE 60FR ENDO BITEBLOCK

## (undated) DEVICE — SPECIMEN TRAP MUCOUS 40CC

## (undated) DEVICE — STOPCOCK THREE WAY LUER LOK